# Patient Record
Sex: FEMALE | Race: OTHER | Employment: FULL TIME | URBAN - NONMETROPOLITAN AREA
[De-identification: names, ages, dates, MRNs, and addresses within clinical notes are randomized per-mention and may not be internally consistent; named-entity substitution may affect disease eponyms.]

---

## 2017-06-03 ENCOUNTER — HOSPITAL ENCOUNTER (EMERGENCY)
Age: 32
Discharge: HOME | End: 2017-06-03
Payer: COMMERCIAL

## 2017-06-03 VITALS
BODY MASS INDEX: 32.95 KG/M2 | TEMPERATURE: 98.2 F | OXYGEN SATURATION: 99 % | SYSTOLIC BLOOD PRESSURE: 110 MMHG | HEART RATE: 78 BPM | RESPIRATION RATE: 20 BRPM | WEIGHT: 205 LBS | DIASTOLIC BLOOD PRESSURE: 80 MMHG | HEIGHT: 66 IN

## 2017-06-03 LAB
ALBUMIN SERPL-MCNC: 4.7 G/DL (ref 3.5–5.2)
ALP BLD-CCNC: 57 U/L (ref 35–104)
ALT SERPL-CCNC: 10 U/L (ref 5–33)
AST SERPL-CCNC: 14 U/L (ref 5–32)
BILIRUB SERPL-MCNC: 0.9 MG/DL (ref 0.2–1.2)
BILIRUBIN DIRECT: 0.2 MG/DL (ref 0–0.3)
BILIRUBIN, INDIRECT: 0.7 MG/DL (ref 0.1–1)
HAV IGM SER IA-ACNC: NORMAL
HEPATITIS B CORE IGM ANTIBODY: NORMAL
HEPATITIS B SURFACE ANTIGEN INTERPRETATION: NORMAL
HEPATITIS C ANTIBODY INTERPRETATION: NORMAL
RAPID HIV 1&2: NORMAL
TOTAL PROTEIN: 7.2 G/DL (ref 6.6–8.7)

## 2017-06-03 PROCEDURE — 90715 TDAP VACCINE 7 YRS/> IM: CPT | Performed by: EMERGENCY MEDICINE

## 2017-06-03 PROCEDURE — 86701 HIV-1ANTIBODY: CPT

## 2017-06-03 PROCEDURE — 99282 EMERGENCY DEPT VISIT SF MDM: CPT

## 2017-06-03 PROCEDURE — 80074 ACUTE HEPATITIS PANEL: CPT

## 2017-06-03 PROCEDURE — 99282 EMERGENCY DEPT VISIT SF MDM: CPT | Performed by: NURSE PRACTITIONER

## 2017-06-03 PROCEDURE — 80076 HEPATIC FUNCTION PANEL: CPT

## 2017-06-03 PROCEDURE — 90471 IMMUNIZATION ADMIN: CPT | Performed by: EMERGENCY MEDICINE

## 2017-06-03 PROCEDURE — 6360000002 HC RX W HCPCS: Performed by: EMERGENCY MEDICINE

## 2017-06-03 PROCEDURE — 36415 COLL VENOUS BLD VENIPUNCTURE: CPT

## 2017-06-03 RX ORDER — MEDROXYPROGESTERONE ACETATE 150 MG/ML
150 INJECTION, SUSPENSION INTRAMUSCULAR
Status: ON HOLD | COMMUNITY
End: 2021-10-07 | Stop reason: ALTCHOICE

## 2017-06-03 RX ADMIN — TETANUS TOXOID, REDUCED DIPHTHERIA TOXOID AND ACELLULAR PERTUSSIS VACCINE, ADSORBED 0.5 ML: 5; 2.5; 8; 8; 2.5 SUSPENSION INTRAMUSCULAR at 01:21

## 2019-04-29 ENCOUNTER — TRANSCRIBE ORDERS (OUTPATIENT)
Dept: ADMINISTRATIVE | Facility: HOSPITAL | Age: 34
End: 2019-04-29

## 2019-04-29 ENCOUNTER — LAB (OUTPATIENT)
Dept: LAB | Facility: HOSPITAL | Age: 34
End: 2019-04-29

## 2019-04-29 DIAGNOSIS — R30.0 DYSURIA: Primary | ICD-10-CM

## 2019-04-29 DIAGNOSIS — R30.0 DYSURIA: ICD-10-CM

## 2019-04-29 LAB
BACTERIA UR QL AUTO: ABNORMAL /HPF
BILIRUB UR QL STRIP: NEGATIVE
CLARITY UR: CLEAR
COLOR UR: YELLOW
GLUCOSE UR STRIP-MCNC: NEGATIVE MG/DL
HGB UR QL STRIP.AUTO: ABNORMAL
HYALINE CASTS UR QL AUTO: ABNORMAL /LPF
KETONES UR QL STRIP: NEGATIVE
LEUKOCYTE ESTERASE UR QL STRIP.AUTO: ABNORMAL
NITRITE UR QL STRIP: NEGATIVE
PH UR STRIP.AUTO: 6 [PH] (ref 5–8)
PROT UR QL STRIP: NEGATIVE
RBC # UR: ABNORMAL /HPF
REF LAB TEST METHOD: ABNORMAL
SP GR UR STRIP: <=1.005 (ref 1–1.03)
SQUAMOUS #/AREA URNS HPF: ABNORMAL /HPF
UROBILINOGEN UR QL STRIP: ABNORMAL
WBC UR QL AUTO: ABNORMAL /HPF

## 2019-04-29 PROCEDURE — 87086 URINE CULTURE/COLONY COUNT: CPT | Performed by: PEDIATRICS

## 2019-04-29 PROCEDURE — 87186 SC STD MICRODIL/AGAR DIL: CPT | Performed by: PEDIATRICS

## 2019-04-29 PROCEDURE — 87088 URINE BACTERIA CULTURE: CPT | Performed by: PEDIATRICS

## 2019-04-29 PROCEDURE — 81001 URINALYSIS AUTO W/SCOPE: CPT | Performed by: PEDIATRICS

## 2019-05-01 LAB
BACTERIA SPEC AEROBE CULT: ABNORMAL
BACTERIA SPEC AEROBE CULT: ABNORMAL

## 2019-05-15 ENCOUNTER — OFFICE VISIT (OUTPATIENT)
Dept: GASTROENTEROLOGY | Facility: CLINIC | Age: 34
End: 2019-05-15

## 2019-05-15 ENCOUNTER — LAB (OUTPATIENT)
Dept: LAB | Facility: HOSPITAL | Age: 34
End: 2019-05-15

## 2019-05-15 VITALS
BODY MASS INDEX: 37.32 KG/M2 | DIASTOLIC BLOOD PRESSURE: 74 MMHG | HEIGHT: 65 IN | TEMPERATURE: 98.2 F | OXYGEN SATURATION: 100 % | HEART RATE: 104 BPM | WEIGHT: 224 LBS | SYSTOLIC BLOOD PRESSURE: 124 MMHG

## 2019-05-15 DIAGNOSIS — K59.09 OTHER CONSTIPATION: ICD-10-CM

## 2019-05-15 DIAGNOSIS — K62.5 RECTAL BLEEDING: ICD-10-CM

## 2019-05-15 DIAGNOSIS — K59.09 OTHER CONSTIPATION: Primary | ICD-10-CM

## 2019-05-15 DIAGNOSIS — K64.8 OTHER HEMORRHOIDS: ICD-10-CM

## 2019-05-15 LAB
ALBUMIN SERPL-MCNC: 4.5 G/DL (ref 3.5–5)
ALBUMIN/GLOB SERPL: 1.4 G/DL (ref 1.1–2.5)
ALP SERPL-CCNC: 57 U/L (ref 24–120)
ALT SERPL W P-5'-P-CCNC: 15 U/L (ref 0–54)
ANION GAP SERPL CALCULATED.3IONS-SCNC: 10 MMOL/L (ref 4–13)
AST SERPL-CCNC: 29 U/L (ref 7–45)
BASOPHILS # BLD AUTO: 0.02 10*3/MM3 (ref 0–0.2)
BASOPHILS NFR BLD AUTO: 0.5 % (ref 0–2)
BILIRUB SERPL-MCNC: 1.2 MG/DL (ref 0.1–1)
BUN BLD-MCNC: 9 MG/DL (ref 5–21)
BUN/CREAT SERPL: 15.3 (ref 7–25)
CALCIUM SPEC-SCNC: 9.4 MG/DL (ref 8.4–10.4)
CHLORIDE SERPL-SCNC: 108 MMOL/L (ref 98–110)
CO2 SERPL-SCNC: 25 MMOL/L (ref 24–31)
CREAT BLD-MCNC: 0.59 MG/DL (ref 0.5–1.4)
DEPRECATED RDW RBC AUTO: 38.3 FL (ref 40–54)
EOSINOPHIL # BLD AUTO: 0.04 10*3/MM3 (ref 0–0.7)
EOSINOPHIL NFR BLD AUTO: 1.1 % (ref 0–4)
ERYTHROCYTE [DISTWIDTH] IN BLOOD BY AUTOMATED COUNT: 12.7 % (ref 12–15)
GFR SERPL CREATININE-BSD FRML MDRD: 116 ML/MIN/1.73
GLOBULIN UR ELPH-MCNC: 3.2 GM/DL
GLUCOSE BLD-MCNC: 86 MG/DL (ref 70–100)
HCT VFR BLD AUTO: 36.6 % (ref 37–47)
HGB BLD-MCNC: 11.9 G/DL (ref 12–16)
IMM GRANULOCYTES # BLD AUTO: 0.01 10*3/MM3 (ref 0–0.05)
IMM GRANULOCYTES NFR BLD AUTO: 0.3 % (ref 0–5)
LYMPHOCYTES # BLD AUTO: 0.98 10*3/MM3 (ref 0.72–4.86)
LYMPHOCYTES NFR BLD AUTO: 26.1 % (ref 15–45)
MCH RBC QN AUTO: 27.5 PG (ref 28–32)
MCHC RBC AUTO-ENTMCNC: 32.5 G/DL (ref 33–36)
MCV RBC AUTO: 84.5 FL (ref 82–98)
MONOCYTES # BLD AUTO: 0.38 10*3/MM3 (ref 0.19–1.3)
MONOCYTES NFR BLD AUTO: 10.1 % (ref 4–12)
NEUTROPHILS # BLD AUTO: 2.32 10*3/MM3 (ref 1.87–8.4)
NEUTROPHILS NFR BLD AUTO: 61.9 % (ref 39–78)
NRBC BLD AUTO-RTO: 0 /100 WBC (ref 0–0.2)
PLATELET # BLD AUTO: 247 10*3/MM3 (ref 130–400)
PMV BLD AUTO: 10.4 FL (ref 6–12)
POTASSIUM BLD-SCNC: 4.1 MMOL/L (ref 3.5–5.3)
PROT SERPL-MCNC: 7.7 G/DL (ref 6.3–8.7)
RBC # BLD AUTO: 4.33 10*6/MM3 (ref 4.2–5.4)
SODIUM BLD-SCNC: 143 MMOL/L (ref 135–145)
T4 FREE SERPL-MCNC: 1.04 NG/DL (ref 0.78–2.19)
TSH SERPL DL<=0.05 MIU/L-ACNC: 1.18 MIU/ML (ref 0.47–4.68)
WBC NRBC COR # BLD: 3.75 10*3/MM3 (ref 4.8–10.8)

## 2019-05-15 PROCEDURE — 99204 OFFICE O/P NEW MOD 45 MIN: CPT | Performed by: NURSE PRACTITIONER

## 2019-05-15 PROCEDURE — 84443 ASSAY THYROID STIM HORMONE: CPT | Performed by: NURSE PRACTITIONER

## 2019-05-15 PROCEDURE — 85025 COMPLETE CBC W/AUTO DIFF WBC: CPT | Performed by: NURSE PRACTITIONER

## 2019-05-15 PROCEDURE — 36415 COLL VENOUS BLD VENIPUNCTURE: CPT

## 2019-05-15 PROCEDURE — 84439 ASSAY OF FREE THYROXINE: CPT | Performed by: NURSE PRACTITIONER

## 2019-05-15 PROCEDURE — 80053 COMPREHEN METABOLIC PANEL: CPT | Performed by: NURSE PRACTITIONER

## 2019-05-15 RX ORDER — SODIUM, POTASSIUM,MAG SULFATES 17.5-3.13G
1 SOLUTION, RECONSTITUTED, ORAL ORAL EVERY 12 HOURS
Qty: 2 BOTTLE | Refills: 0 | Status: ON HOLD | OUTPATIENT
Start: 2019-05-15 | End: 2019-05-22

## 2019-05-15 RX ORDER — MEDROXYPROGESTERONE ACETATE 150 MG/ML
1 INJECTION, SUSPENSION INTRAMUSCULAR
COMMUNITY
End: 2020-04-21

## 2019-05-15 NOTE — PROGRESS NOTES
"Chief Complaint:   Chief Complaint   Patient presents with   • Constipation     Pt has had increased constipation since January; will have very had stools in the mornings; pt had a colonoscopy in [place] in 2015   • Hemorrhoids     Pt states she has always had a \"small cluster\" of hemorrhoids; states she would see [facility] when she would have to strain for a BM; has been using OTC wipes/creams that seem to help   • Rectal Pain     Pt states she will have rectal pain for a few hours after a BM; states she has been using laxatives/laxative tea and hasn't experienced as much pain the last month          Patient ID: Ratna Flor is a 33 y.o. female     History of Present Illness: This is a very pleasant 33-year-old female who is here today with complaints of constipation, hemorrhoids and rectal pain.    The patient was referred by Dr. Tiwari.  She was seen in his office for for UTI on 4/30/2019.  the patient also had complaints of saying that she had irritable bowel syndrome with diarrhea.  The patient was treated with a Z-Wyatt and Rocephin injection for the UTI.    The patient had a colonoscopy in [facility] [place] and 2015 for complaints of bloody stools and found a \"cluster of hemorrhoids.\" She states there is no family history of colon cancer or colon polyps.     She states she that starting in January was having a stool everyday but very hard and painful bowel movement. She states that she is using a \"laxative tea and biscodyl.\"  She states that those have seemed to help.  She states that the cream she was given has helped the hemorrhoid some but not completely resolved.  She states she does see blood with wiping and sometimes in the stool water.    The patient denies any nausea, vomiting, epigastric pain, dysphagia, pyrosis or hematemesis.  The patient denies any fever or chills.  Denies any melena or hematochezia.  Denies any unintentional weight loss or loss of appetite.    History reviewed. No pertinent past medical " "history.    Past Surgical History:   Procedure Laterality Date   • WISDOM TOOTH EXTRACTION           Current Outpatient Medications:   •  Bisacodyl (LAXATIVE PO), Take  by mouth Daily., Disp: , Rfl:   •  medroxyPROGESTERone (DEPO-PROVERA) 150 MG/ML injection, Inject 1 mL under the skin into the appropriate area as directed Every 3 (Three) Months., Disp: , Rfl:   •  Probiotic Product (PROBIOTIC ADVANCED PO), Take 1 tablet by mouth Every Other Day., Disp: , Rfl:   •  hydrocortisone (ANUSOL-HC) 2.5 % rectal cream, Insert  into the rectum 4 (Four) Times a Day As Needed for Hemorrhoids (rectal discomfort)., Disp: 30 g, Rfl: 5  •  sodium-potassium-magnesium sulfates (SUPREP BOWEL PREP KIT) 17.5-3.13-1.6 GM/177ML solution oral solution, Take 1 bottle by mouth Every 12 (Twelve) Hours. Split dose prep as directed by office instructions provided.  2 bottles = one kit., Disp: 2 bottle, Rfl: 0    No Known Allergies    Social History     Socioeconomic History   • Marital status:      Spouse name: Not on file   • Number of children: Not on file   • Years of education: Not on file   • Highest education level: Not on file   Tobacco Use   • Smoking status: Former Smoker   • Smokeless tobacco: Never Used   Substance and Sexual Activity   • Alcohol use: Yes     Frequency: Never     Comment: Occasional       Family History   Problem Relation Age of Onset   • Colon cancer Neg Hx    • Colon polyps Neg Hx    • Esophageal cancer Neg Hx    • Liver cancer Neg Hx    • Stomach cancer Neg Hx    • Rectal cancer Neg Hx        Vitals:    05/15/19 0832   BP: 124/74   BP Location: Left arm   Patient Position: Sitting   Cuff Size: Adult   Pulse: 104   Temp: 98.2 °F (36.8 °C)   TempSrc: Tympanic   SpO2: 100%   Weight: 102 kg (224 lb)   Height: 165.1 cm (65\")       Review of Systems:    General:    Present -feeling well   Skin:    Not Present-Rash   HEENT:     Not Present-Acute visual changes or Acute hearing changes   Neck :    Not Present- " swollen glands   Genitourinary:      Not Present- burning, frequency, urgency hematuria, dysuria,   Cardiovascular:   Not Present-chest pain, palpitations, or pressure   Respiratory:   Not Present- shortness of breath or cough   Gastrointestinal:  Musculoskeletal:  Neurological:  Psychiatric:   Present as mentioned in the HP    Not Present. Recent gait disturbances.    Not Present-Seizures and weakness in extremities.    Not Present- Anxiety or Depression.       Physical Exam:    General Appearance:    Alert, cooperative, in no acute distress   Psych:    Mood appropriate    Eyes:          conjunctivae and sclerae normal, no   icterus, no pallor   ENMT:    Ears appear intact with no abnormalities noted oral mucosa moist   Neck:   No adenopathy, supple, trachea midline, no thyromegaly, no   carotid bruit, no JVD    Cardiovascular:    Regular rhythm and normal rate, normal S1 and S2, no            murmur, no gallop, no rub, no click   Gastrointestinal:     Inspection normal.  Normal bowel sounds, no masses, no organomegaly, soft round non-tender, non-distended, no guarding, no rebound or tenderness. No hepatosplenomegaly.   Skin:   No bleeding, bruising or rash   Neurologic:   nonfocal       Lab Results   Component Value Date    WBC 3.75 (L) 05/15/2019    HGB 11.9 (L) 05/15/2019    HCT 36.6 (L) 05/15/2019     05/15/2019        Lab Results   Component Value Date     05/15/2019    K 4.1 05/15/2019     05/15/2019    CO2 25.0 05/15/2019    BUN 9 05/15/2019    CREATININE 0.59 05/15/2019    BILITOT 1.2 (H) 05/15/2019    ALKPHOS 57 05/15/2019    ALT 15 05/15/2019    AST 29 05/15/2019    GLUCOSE 86 05/15/2019       No results found for: INR    Body mass index is 37.28 kg/m². Patient's Body mass index is 37.28 kg/m². BMI is above normal parameters. Recommendations include: nutrition counseling.    Assessment and Plan:  Assessment/Plan   Ratna was seen today for constipation, hemorrhoids and rectal  pain.    Diagnoses and all orders for this visit:    Other constipation  Comments:  Discussed using MiraLAX the patient states that she would try it.  Orders:  -     TSH; Future  -     T4, Free; Future  -     CBC & Differential; Future  -     Comprehensive Metabolic Panel; Future  -     Case Request; Standing  -     Case Request    Rectal bleeding  Comments:  Schedule colonoscopy.  Orders:  -     Case Request; Standing  -     Case Request    Other hemorrhoids  Comments:  Prescription for Anusol sent to pharmacy.  Orders:  -     Case Request; Standing  -     Case Request    Other orders  -     hydrocortisone (ANUSOL-HC) 2.5 % rectal cream; Insert  into the rectum 4 (Four) Times a Day As Needed for Hemorrhoids (rectal discomfort).  -     Follow Anesthesia Guidelines / Standing Orders; Future  -     Obtain Informed Consent; Future  -     Implement Anesthesia Orders Day of Procedure; Standing  -     Obtain Informed Consent; Standing  -     Verify bowel prep was successful; Standing  -     sodium-potassium-magnesium sulfates (SUPREP BOWEL PREP KIT) 17.5-3.13-1.6 GM/177ML solution oral solution; Take 1 bottle by mouth Every 12 (Twelve) Hours. Split dose prep as directed by office instructions provided.  2 bottles = one kit.             There are no Patient Instructions on file for this visit.    Next follow-up appointment      The risks, benefits, and alternatives of colonoscopy were reviewed with the patient today.  Risks including perforation of the colon possibly requiring surgery or colostomy.  Additional risks include risk of bleeding from biopsies or removal of colon tissue.  There is also the risk of a drug reaction or problems with anesthesia.  This will be discussed with the further by the anesthesia team on the day of the procedure.  Lastly there is a possibility of missing a colon polyp or cancer.  The benefits include the diagnosis and management of disease of the colon and rectum.  Alternatives to colonoscopy  include barium enema, laboratory testing, radiographic evaluation, or no intervention.  The patient verbalizes understanding and agrees.      EMR Dragon/Transcription disclaimer:  Much of this encounter note is an electronic transcription/translation of spoken language to printed text. The electronic translation of spoken language may permit erroneous, or at times, nonsensical words or phrases to be inadvertently transcribed; although I have reviewed the note for such errors, some may still exist.

## 2019-05-22 ENCOUNTER — ANESTHESIA EVENT (OUTPATIENT)
Dept: GASTROENTEROLOGY | Facility: HOSPITAL | Age: 34
End: 2019-05-22

## 2019-05-22 ENCOUNTER — ANESTHESIA (OUTPATIENT)
Dept: GASTROENTEROLOGY | Facility: HOSPITAL | Age: 34
End: 2019-05-22

## 2019-05-22 ENCOUNTER — HOSPITAL ENCOUNTER (OUTPATIENT)
Facility: HOSPITAL | Age: 34
Setting detail: HOSPITAL OUTPATIENT SURGERY
Discharge: HOME | End: 2019-05-22
Attending: INTERNAL MEDICINE | Admitting: INTERNAL MEDICINE

## 2019-05-22 VITALS
RESPIRATION RATE: 14 BRPM | TEMPERATURE: 98.2 F | DIASTOLIC BLOOD PRESSURE: 65 MMHG | HEART RATE: 73 BPM | HEIGHT: 65 IN | WEIGHT: 224 LBS | BODY MASS INDEX: 37.32 KG/M2 | SYSTOLIC BLOOD PRESSURE: 112 MMHG | OXYGEN SATURATION: 100 %

## 2019-05-22 LAB — B-HCG UR QL: NEGATIVE

## 2019-05-22 PROCEDURE — 45378 DIAGNOSTIC COLONOSCOPY: CPT | Performed by: INTERNAL MEDICINE

## 2019-05-22 PROCEDURE — 25010000002 PROPOFOL 10 MG/ML EMULSION: Performed by: NURSE ANESTHETIST, CERTIFIED REGISTERED

## 2019-05-22 PROCEDURE — 81025 URINE PREGNANCY TEST: CPT | Performed by: ANESTHESIOLOGY

## 2019-05-22 RX ORDER — SODIUM CHLORIDE 0.9 % (FLUSH) 0.9 %
3 SYRINGE (ML) INJECTION AS NEEDED
Status: DISCONTINUED | OUTPATIENT
Start: 2019-05-22 | End: 2019-05-22 | Stop reason: HOSPADM

## 2019-05-22 RX ORDER — SODIUM CHLORIDE 9 MG/ML
500 INJECTION, SOLUTION INTRAVENOUS CONTINUOUS PRN
Status: DISCONTINUED | OUTPATIENT
Start: 2019-05-22 | End: 2019-05-22 | Stop reason: HOSPADM

## 2019-05-22 RX ORDER — LIDOCAINE HYDROCHLORIDE 20 MG/ML
INJECTION, SOLUTION INFILTRATION; PERINEURAL AS NEEDED
Status: DISCONTINUED | OUTPATIENT
Start: 2019-05-22 | End: 2019-05-22 | Stop reason: SURG

## 2019-05-22 RX ORDER — PROPOFOL 10 MG/ML
VIAL (ML) INTRAVENOUS AS NEEDED
Status: DISCONTINUED | OUTPATIENT
Start: 2019-05-22 | End: 2019-05-22 | Stop reason: SURG

## 2019-05-22 RX ADMIN — LIDOCAINE HYDROCHLORIDE 50 MG: 20 INJECTION, SOLUTION INFILTRATION; PERINEURAL at 11:12

## 2019-05-22 RX ADMIN — PROPOFOL 520 MG: 10 INJECTION, EMULSION INTRAVENOUS at 11:12

## 2019-05-22 RX ADMIN — SODIUM CHLORIDE 500 ML: 9 INJECTION, SOLUTION INTRAVENOUS at 09:34

## 2019-05-22 NOTE — ANESTHESIA POSTPROCEDURE EVALUATION
Patient: Ratna Flor    Procedure Summary     Date:  05/22/19 Room / Location:  [facility] ENDOSCOPY 2 /  [facility]    Anesthesia Start:  1105 Anesthesia Stop:  1130    Procedure:  COLONOSCOPY WITH ANESTHESIA (N/A ) Diagnosis:       Other constipation      Rectal bleeding      Other hemorrhoids      (Other constipation [K59.09])      (Rectal bleeding [K62.5])      (Other hemorrhoids [K64.8])    Surgeon:  Octavia Mattson MD Provider:  Marita Pemberton CRNA    Anesthesia Type:  MAC ASA Status:  2          Anesthesia Type: MAC  Last vitals  BP   112/65 (05/22/19 1150)   Temp   98.2 °F (36.8 °C) (05/22/19 0909)   Pulse   73 (05/22/19 1150)   Resp   14 (05/22/19 1150)     SpO2   100 % (05/22/19 1150)     Post Anesthesia Care and Evaluation    Patient location during evaluation: PHASE II  Patient participation: complete - patient participated  Level of consciousness: awake and alert  Pain score: 0  Pain management: adequate  Airway patency: patent  Anesthetic complications: No anesthetic complications  PONV Status: none  Cardiovascular status: acceptable  Respiratory status: acceptable  Hydration status: acceptable  No anesthesia care post op

## 2019-05-22 NOTE — ANESTHESIA PREPROCEDURE EVALUATION
Anesthesia Evaluation     Patient summary reviewed and Nursing notes reviewed   no history of anesthetic complications:  NPO Solid Status: > 8 hours  NPO Liquid Status: > 4 hours           Airway   Mallampati: I  TM distance: >3 FB  Neck ROM: full  No difficulty expected  Dental - normal exam     Pulmonary - negative pulmonary ROS and normal exam   (-) sleep apnea, not a smoker  Cardiovascular - negative cardio ROS and normal exam  Exercise tolerance: good (4-7 METS)    (-) hypertension, CAD      Neuro/Psych- negative ROS  GI/Hepatic/Renal/Endo    (+) obesity,       Musculoskeletal (-) negative ROS    Abdominal  - normal exam    Bowel sounds: normal.   Substance History - negative use     OB/GYN negative ob/gyn ROS         Other                        Anesthesia Plan    ASA 2     MAC     intravenous induction   Anesthetic plan, all risks, benefits, and alternatives have been provided, discussed and informed consent has been obtained with: patient.

## 2019-07-19 ENCOUNTER — HOSPITAL ENCOUNTER (OUTPATIENT)
Dept: ULTRASOUND IMAGING | Facility: HOSPITAL | Age: 34
Discharge: HOME | End: 2019-07-19
Admitting: NURSE PRACTITIONER

## 2019-07-19 ENCOUNTER — TRANSCRIBE ORDERS (OUTPATIENT)
Dept: ADMINISTRATIVE | Facility: HOSPITAL | Age: 34
End: 2019-07-19

## 2019-07-19 DIAGNOSIS — R60.0 LOCALIZED EDEMA: ICD-10-CM

## 2019-07-19 DIAGNOSIS — R60.0 LOCALIZED EDEMA: Primary | ICD-10-CM

## 2019-07-19 PROCEDURE — 93971 EXTREMITY STUDY: CPT

## 2020-04-08 ENCOUNTER — TRANSCRIBE ORDERS (OUTPATIENT)
Dept: ADMINISTRATIVE | Facility: HOSPITAL | Age: 35
End: 2020-04-08

## 2020-04-08 ENCOUNTER — APPOINTMENT (OUTPATIENT)
Dept: LAB | Facility: HOSPITAL | Age: 35
End: 2020-04-08

## 2020-04-08 DIAGNOSIS — R10.817 GENERALIZED ABDOMINAL TENDERNESS, REBOUND TENDERNESS PRESENCE NOT SPECIFIED: Primary | ICD-10-CM

## 2020-04-08 LAB
ALBUMIN SERPL-MCNC: 4.5 G/DL (ref 3.5–5)
ALBUMIN/GLOB SERPL: 1.2 G/DL (ref 1.1–2.5)
ALP SERPL-CCNC: 59 U/L (ref 24–120)
ALT SERPL W P-5'-P-CCNC: 13 U/L (ref 0–35)
AMYLASE SERPL-CCNC: 121 U/L (ref 30–110)
ANION GAP SERPL CALCULATED.3IONS-SCNC: 13 MMOL/L (ref 4–13)
AST SERPL-CCNC: 19 U/L (ref 7–45)
AUTO MIXED CELLS #: 0.8 10*3/MM3 (ref 0.1–2.6)
AUTO MIXED CELLS %: 11.1 % (ref 0.1–24)
BACTERIA UR QL AUTO: ABNORMAL /HPF
BILIRUB SERPL-MCNC: 1.3 MG/DL (ref 0.1–1)
BILIRUB UR QL STRIP: NEGATIVE
BUN BLD-MCNC: 12 MG/DL (ref 5–21)
BUN/CREAT SERPL: 20.3
CALCIUM SPEC-SCNC: 9.6 MG/DL (ref 8.4–10.4)
CHLORIDE SERPL-SCNC: 105 MMOL/L (ref 98–110)
CLARITY UR: CLEAR
CO2 SERPL-SCNC: 26 MMOL/L (ref 24–31)
COLOR UR: YELLOW
CREAT BLD-MCNC: 0.59 MG/DL (ref 0.5–1.4)
ERYTHROCYTE [DISTWIDTH] IN BLOOD BY AUTOMATED COUNT: 12.2 % (ref 12.3–15.4)
ERYTHROCYTE [SEDIMENTATION RATE] IN BLOOD: 12 MM/HR (ref 0–20)
GFR SERPL CREATININE-BSD FRML MDRD: 115 ML/MIN/1.73
GLOBULIN UR ELPH-MCNC: 3.7 GM/DL
GLUCOSE BLD-MCNC: 92 MG/DL (ref 70–100)
GLUCOSE UR STRIP-MCNC: NEGATIVE MG/DL
HCT VFR BLD AUTO: 41.4 % (ref 34–46.6)
HGB BLD-MCNC: 14.1 G/DL (ref 12–15.9)
HGB UR QL STRIP.AUTO: ABNORMAL
HYALINE CASTS UR QL AUTO: ABNORMAL /LPF
KETONES UR QL STRIP: NEGATIVE
LEUKOCYTE ESTERASE UR QL STRIP.AUTO: NEGATIVE
LIPASE SERPL-CCNC: 184 U/L (ref 23–203)
LYMPHOCYTES # BLD AUTO: 1.5 10*3/MM3 (ref 0.7–3.1)
LYMPHOCYTES NFR BLD AUTO: 21.9 % (ref 19.6–45.3)
MCH RBC QN AUTO: 29.1 PG (ref 26.6–33)
MCHC RBC AUTO-ENTMCNC: 34.1 G/DL (ref 31.5–35.7)
MCV RBC AUTO: 85.4 FL (ref 79–97)
MUCOUS THREADS URNS QL MICRO: ABNORMAL /HPF
NEUTROPHILS # BLD AUTO: 4.7 10*3/MM3 (ref 1.7–7)
NEUTROPHILS NFR BLD AUTO: 67 % (ref 42.7–76)
NITRITE UR QL STRIP: NEGATIVE
PH UR STRIP.AUTO: 5.5 [PH] (ref 5–8)
PLATELET # BLD AUTO: 272 10*3/MM3 (ref 140–450)
PMV BLD AUTO: 9.3 FL (ref 6–12)
POTASSIUM BLD-SCNC: 4 MMOL/L (ref 3.5–5.3)
PROT SERPL-MCNC: 8.2 G/DL (ref 6.3–8.7)
PROT UR QL STRIP: NEGATIVE
RBC # BLD AUTO: 4.85 10*6/MM3 (ref 3.77–5.28)
RBC # UR: ABNORMAL /HPF
REF LAB TEST METHOD: ABNORMAL
SODIUM BLD-SCNC: 144 MMOL/L (ref 135–145)
SP GR UR STRIP: 1.02 (ref 1–1.03)
SQUAMOUS #/AREA URNS HPF: ABNORMAL /HPF
UROBILINOGEN UR QL STRIP: ABNORMAL
WBC NRBC COR # BLD: 7 10*3/MM3 (ref 3.4–10.8)
WBC UR QL AUTO: ABNORMAL /HPF

## 2020-04-08 PROCEDURE — 36415 COLL VENOUS BLD VENIPUNCTURE: CPT | Performed by: NURSE PRACTITIONER

## 2020-04-08 PROCEDURE — 85652 RBC SED RATE AUTOMATED: CPT | Performed by: NURSE PRACTITIONER

## 2020-04-08 PROCEDURE — 83690 ASSAY OF LIPASE: CPT | Performed by: NURSE PRACTITIONER

## 2020-04-08 PROCEDURE — 80053 COMPREHEN METABOLIC PANEL: CPT | Performed by: NURSE PRACTITIONER

## 2020-04-08 PROCEDURE — 81001 URINALYSIS AUTO W/SCOPE: CPT | Performed by: NURSE PRACTITIONER

## 2020-04-08 PROCEDURE — 87086 URINE CULTURE/COLONY COUNT: CPT | Performed by: NURSE PRACTITIONER

## 2020-04-08 PROCEDURE — 82150 ASSAY OF AMYLASE: CPT | Performed by: NURSE PRACTITIONER

## 2020-04-08 PROCEDURE — 85025 COMPLETE CBC W/AUTO DIFF WBC: CPT | Performed by: NURSE PRACTITIONER

## 2020-04-09 ENCOUNTER — HOSPITAL ENCOUNTER (OUTPATIENT)
Dept: ULTRASOUND IMAGING | Facility: HOSPITAL | Age: 35
Discharge: HOME | End: 2020-04-09
Admitting: NURSE PRACTITIONER

## 2020-04-09 PROCEDURE — 76700 US EXAM ABDOM COMPLETE: CPT

## 2020-04-10 LAB — BACTERIA SPEC AEROBE CULT: NORMAL

## 2020-04-20 ENCOUNTER — LAB (OUTPATIENT)
Dept: LAB | Facility: HOSPITAL | Age: 35
End: 2020-04-20

## 2020-04-20 ENCOUNTER — TRANSCRIBE ORDERS (OUTPATIENT)
Dept: LAB | Facility: HOSPITAL | Age: 35
End: 2020-04-20

## 2020-04-20 DIAGNOSIS — R31.9 URINARY TRACT INFECTION WITH HEMATURIA, SITE UNSPECIFIED: ICD-10-CM

## 2020-04-20 DIAGNOSIS — R31.9 URINARY TRACT INFECTION WITH HEMATURIA, SITE UNSPECIFIED: Primary | ICD-10-CM

## 2020-04-20 DIAGNOSIS — N39.0 URINARY TRACT INFECTION WITH HEMATURIA, SITE UNSPECIFIED: ICD-10-CM

## 2020-04-20 DIAGNOSIS — N39.0 URINARY TRACT INFECTION WITH HEMATURIA, SITE UNSPECIFIED: Primary | ICD-10-CM

## 2020-04-20 LAB
BILIRUB UR QL STRIP: NEGATIVE
CLARITY UR: CLEAR
COLOR UR: YELLOW
GLUCOSE UR STRIP-MCNC: NEGATIVE MG/DL
HGB UR QL STRIP.AUTO: NEGATIVE
KETONES UR QL STRIP: NEGATIVE
LEUKOCYTE ESTERASE UR QL STRIP.AUTO: NEGATIVE
NITRITE UR QL STRIP: NEGATIVE
PH UR STRIP.AUTO: 7 [PH] (ref 5–8)
PROT UR QL STRIP: NEGATIVE
SP GR UR STRIP: 1.01 (ref 1–1.03)
UROBILINOGEN UR QL STRIP: NORMAL

## 2020-04-20 PROCEDURE — 81003 URINALYSIS AUTO W/O SCOPE: CPT

## 2020-04-20 NOTE — PROGRESS NOTES
"Chief Complaint:   Chief Complaint   Patient presents with   • Abdominal Pain     Pt states every morning since the beginning of the year she wakes up and her stomach \"is turning/churning\"-will have to use bathroom and sometimes she had diarrhea but sometimes it's a normal BM; Pt states then she will get a lower abdominal presure like she needs to use the bathroom; Pt states this will happen 5-7 times every morning and just seems to be getting worse; Pt was seen at [facility]-states she had a US and was started on Dicyclomine-hasn't helped at all    • Diarrhea     Pt also c/o occasional diarrhea in the mornings   • Nausea     Pt also states she is nauseated occasionally          Patient ID: Ratna Flor is a 34 y.o. female     History of Present Illness: This is a very pleasant 34-year-old female who I have had over the phone encounter with for complaints of lower abdominal pain, diarrhea and constipation with diarrhea predominating along with some nausea.    Starting in January she states that she begins to have lower abdominal pain that feels \"like I have a bowel movement sometimes constipation and sometimes not.\" She states that she will continue to have the urge to have a bowel movement.  She states that loose stools to watery stools will predominate.  She states she can noticed this to be worse with eating however this begins to occur \"most every single morning.\"  She states when she has to \"push to have BM it makes me nauseated.\" She states that this can occur 6-7 times a day. She states that it takes until about mid afternoon until it stops. She states that she was given dicyclomine for about 2 weeks but this has not helped. She states she was also treated with antibiotics about 2 weeks ago for UTI.  Labs performed by Dr. Tiwari her PCP CBC and CMP on 4/8/2020 were found to be unremarkable.  Abdomen ultrasound complete found to be normal.  Amylase slightly elevated but lipase within normal limits.    The " patient's last colonoscopy was performed on 5/22/2019 with nonbleeding internal hemorrhoids otherwise normal.  Repeat colonoscopy was scheduled for 48 years old screening purposes.  There is no known family history of colon cancer or colon polyps.The patient denies any  vomiting, epigastric pain, dysphagia, pyrosis or hematemesis.  The patient denies any fever or chills.  Denies any melena or hematochezia.  Denies any unintentional weight loss or loss of appetite.    History reviewed. No pertinent past medical history.    Past Surgical History:   Procedure Laterality Date   • COLONOSCOPY N/A 5/22/2019    Non-bleeding internal hemorrhoids; The examination was otherwise normal; No specimens collected; Repeat colonoscopy at age 48 for screening purposes   • WISDOM TOOTH EXTRACTION           Current Outpatient Medications:   •  dicyclomine (BENTYL) 20 MG tablet, Take 20 mg by mouth 4 (Four) Times a Day., Disp: , Rfl:   •  ibuprofen (ADVIL,MOTRIN) 200 MG tablet, Take 200 mg by mouth As Needed for Mild Pain ., Disp: , Rfl:   •  Levonorgestrel (Liletta, 52 MG,) 19.5 MCG/DAY intrauterine device, by Intrauterine route 1 (One) Time., Disp: , Rfl:     No Known Allergies    Social History     Socioeconomic History   • Marital status:      Spouse name: Not on file   • Number of children: Not on file   • Years of education: Not on file   • Highest education level: Not on file   Tobacco Use   • Smoking status: Former Smoker     Types: Cigarettes   • Smokeless tobacco: Never Used   Substance and Sexual Activity   • Alcohol use: Yes     Frequency: Never     Comment: Occasional   • Drug use: No   • Sexual activity: Defer       Family History   Problem Relation Age of Onset   • Colon cancer Neg Hx    • Colon polyps Neg Hx    • Esophageal cancer Neg Hx    • Liver cancer Neg Hx    • Stomach cancer Neg Hx    • Rectal cancer Neg Hx    • Liver disease Neg Hx        There were no vitals filed for this visit.    Review of  Systems:    General:    Present -feeling well   Skin:    Not Present-Rash   HEENT:     Not Present-Acute visual changes or Acute hearing changes   Neck :    Not Present- swollen glands   Genitourinary:      Not Present- burning, frequency, urgency hematuria, dysuria,   Cardiovascular:   Not Present-chest pain, palpitations, or pressure   Respiratory:   Not Present- shortness of breath or cough   Gastrointestinal:  Musculoskeletal:  Neurological:  Psychiatric:   Present as mentioned in the HP    Not Present. Recent gait disturbances.    Not Present-Seizures and weakness in extremities.    Not Present- Anxiety or Depression.           Lab Results - Last 18 Months   Lab Units 04/08/20  1448 05/15/19  0951   GLUCOSE mg/dL 92 86   BUN mg/dL 12 9   CREATININE mg/dL 0.59 0.59   SODIUM mmol/L 144 143   POTASSIUM mmol/L 4.0 4.1   CHLORIDE mmol/L 105 108   CO2 mmol/L 26.0 25.0   TOTAL PROTEIN g/dL 8.2 7.7   ALBUMIN g/dL 4.50 4.50   ALT (SGPT) U/L 13 15   AST (SGOT) U/L 19 29   ALK PHOS U/L 59 57   BILIRUBIN mg/dL 1.3* 1.2*   GLOBULIN gm/dL 3.7 3.2   SED RATE mm/hr 12  --        Lab Results - Last 18 Months   Lab Units 04/08/20  1448 05/15/19  0951   HEMOGLOBIN g/dL 14.1 11.9*   HEMATOCRIT % 41.4 36.6*   MCV fL 85.4 84.5   WBC 10*3/mm3 7.00 3.75*   RDW % 12.2* 12.7   MPV fL 9.3 10.4   PLATELETS 10*3/mm3 272 247       Lab Results - Last 18 Months   Lab Units 05/15/19  0951   TSH mIU/mL 1.180          Assessment and Plan:  Assessment/Plan   Ratna was seen today for abdominal pain, diarrhea and nausea.    Diagnoses and all orders for this visit:    Diarrhea, unspecified type  -     TSH; Future  -     T4, Free; Future  -     Gastrointestinal Panel, PCR - Stool, Per Rectum; Future  -     Clostridium Difficile Toxin - , Per Rectum; Future    Lower abdominal pain  -     TSH; Future  -     T4, Free; Future  -     Gastrointestinal Panel, PCR - Stool, Per Rectum; Future  -     Clostridium Difficile Toxin - , Per Rectum;  "Future    Abdominal cramping  -     TSH; Future  -     T4, Free; Future  -     Gastrointestinal Panel, PCR - Stool, Per Rectum; Future  -     Clostridium Difficile Toxin - , Per Rectum; Future    Other constipation  -     TSH; Future  -     T4, Free; Future    I have instructed the patient to come in for labs to be drawn and will check gastrointestinal panel along with C. difficile at this time.  I do not think that we need to advance to a CT of abdomen pelvis however it could be plausible in the future.  At this time I will have her stop the dicyclomine as she states this is not helping at all.  I am going to send education written for IBS.  As noted above the patient had a normal colonoscopy last year.  Will await stool cultures depending on those findings will most likely initiate Imodium.  I instructed the patient to call me if her symptoms change or worsen if they become extreme then go to the ER.  She gave verbal understanding.      This visit has been rescheduled as a phone visit to comply with patient safety concerns in accordance with CDC recommendations. Total time of discussion was 25 minutes.       Patient Instructions   Diet for Irritable Bowel Syndrome  When you have irritable bowel syndrome (IBS), it is very important to eat the foods and follow the eating habits that are best for your condition. IBS may cause various symptoms such as pain in the abdomen, constipation, or diarrhea. Choosing the right foods can help to ease the discomfort from these symptoms. Work with your health care provider and diet and nutrition specialist (dietitian) to find the eating plan that will help to control your symptoms.  What are tips for following this plan?         · Keep a food diary. This will help you identify foods that cause symptoms. Write down:  ? What you eat and when you eat it.  ? What symptoms you have.  ? When symptoms occur in relation to your meals, such as \"pain in abdomen 2 hours after dinner.\"  · Eat " your meals slowly and in a relaxed setting.  · Aim to eat 5-6 small meals per day. Do not skip meals.  · Drink enough fluid to keep your urine pale yellow.  · Ask your health care provider if you should take an over-the-counter probiotic to help restore healthy bacteria in your gut (digestive tract).  ? Probiotics are foods that contain good bacteria and yeasts.  · Your dietitian may have specific dietary recommendations for you based on your symptoms. He or she may recommend that you:  ? Avoid foods that cause symptoms. Talk with your dietitian about other ways to get the same nutrients that are in those problem foods.  ? Avoid foods with gluten. Gluten is a protein that is found in rye, wheat, and barley.  ? Eat more foods that contain soluble fiber. Examples of foods with high soluble fiber include oats, seeds, and certain fruits and vegetables. Take a fiber supplement if directed by your dietitian.  ? Reduce or avoid certain foods called FODMAPs. These are foods that contain carbohydrates that are hard to digest. Ask your doctor which foods contain these carbohydrates.  What foods are not recommended?  The following are some foods and drinks that may make your symptoms worse:  · Fatty foods, such as french fries.  · Foods that contain gluten, such as pasta and cereal.  · Dairy products, such as milk, cheese, and ice cream.  · Chocolate.  · Alcohol.  · Products with caffeine, such as coffee.  · Carbonated drinks, such as soda.  · Foods that are high in FODMAPs. These include certain fruits and vegetables.  · Products with sweeteners such as honey, high fructose corn syrup, sorbitol, and mannitol.  The items listed above may not be a complete list of foods and beverages you should avoid. Contact a dietitian for more information.  What foods are good sources of fiber?  Your health care provider or dietitian may recommend that you eat more foods that contain fiber. Fiber can help to reduce constipation and other IBS  symptoms. Add foods with fiber to your diet a little at a time so your body can get used to them. Too much fiber at one time might cause gas and swelling of your abdomen. The following are some foods that are good sources of fiber:  · Berries, such as raspberries, strawberries, and blueberries.  · Tomatoes.  · Carrots.  · Brown rice.  · Oats.  · Seeds, such as debbie and pumpkin seeds.  The items listed above may not be a complete list of recommended sources of fiber. Contact your dietitian for more options.  Where to find more information  · International Foundation for Functional Gastrointestinal Disorders: www.iffgd.org  · National [place] of Diabetes and Digestive and Kidney Diseases: www.niddk.nih.gov  Summary  · When you have irritable bowel syndrome (IBS), it is very important to eat the foods and follow the eating habits that are best for your condition.  · IBS may cause various symptoms such as pain in the abdomen, constipation, or diarrhea.  · Choosing the right foods can help to ease the discomfort that comes from symptoms.  · Keep a food diary. This will help you identify foods that cause symptoms.  · Your health care provider or diet and nutrition specialist (dietitian) may recommend that you eat more foods that contain fiber.  This information is not intended to replace advice given to you by your health care provider. Make sure you discuss any questions you have with your health care provider.  Document Released: 03/09/2005 Document Revised: 07/15/2019 Document Reviewed: 08/21/2018  [employer] Patient Education © 2020 [employer]        Next follow-up appointment      EMR Dragon/Transcription disclaimer:  Much of this encounter note is an electronic transcription/translation of spoken language to printed text. The electronic translation of spoken language may permit erroneous, or at times, nonsensical words or phrases to be inadvertently transcribed; although I have reviewed the note for  such errors, some may still exist.

## 2020-04-21 ENCOUNTER — OFFICE VISIT (OUTPATIENT)
Dept: GASTROENTEROLOGY | Facility: CLINIC | Age: 35
End: 2020-04-21

## 2020-04-21 DIAGNOSIS — R10.9 ABDOMINAL CRAMPING: ICD-10-CM

## 2020-04-21 DIAGNOSIS — K59.09 OTHER CONSTIPATION: ICD-10-CM

## 2020-04-21 DIAGNOSIS — R10.30 LOWER ABDOMINAL PAIN: ICD-10-CM

## 2020-04-21 DIAGNOSIS — R19.7 DIARRHEA, UNSPECIFIED TYPE: Primary | ICD-10-CM

## 2020-04-21 PROCEDURE — 99443 PR PHYS/QHP TELEPHONE EVALUATION 21-30 MIN: CPT | Performed by: NURSE PRACTITIONER

## 2020-04-21 RX ORDER — IBUPROFEN 200 MG
200 TABLET ORAL AS NEEDED
COMMUNITY
End: 2021-03-15

## 2020-04-21 RX ORDER — DICYCLOMINE HCL 20 MG
20 TABLET ORAL 4 TIMES DAILY
COMMUNITY
Start: 2020-04-08 | End: 2020-05-01

## 2020-04-21 NOTE — PATIENT INSTRUCTIONS
"Diet for Irritable Bowel Syndrome  When you have irritable bowel syndrome (IBS), it is very important to eat the foods and follow the eating habits that are best for your condition. IBS may cause various symptoms such as pain in the abdomen, constipation, or diarrhea. Choosing the right foods can help to ease the discomfort from these symptoms. Work with your health care provider and diet and nutrition specialist (dietitian) to find the eating plan that will help to control your symptoms.  What are tips for following this plan?         · Keep a food diary. This will help you identify foods that cause symptoms. Write down:  ? What you eat and when you eat it.  ? What symptoms you have.  ? When symptoms occur in relation to your meals, such as \"pain in abdomen 2 hours after dinner.\"  · Eat your meals slowly and in a relaxed setting.  · Aim to eat 5-6 small meals per day. Do not skip meals.  · Drink enough fluid to keep your urine pale yellow.  · Ask your health care provider if you should take an over-the-counter probiotic to help restore healthy bacteria in your gut (digestive tract).  ? Probiotics are foods that contain good bacteria and yeasts.  · Your dietitian may have specific dietary recommendations for you based on your symptoms. He or she may recommend that you:  ? Avoid foods that cause symptoms. Talk with your dietitian about other ways to get the same nutrients that are in those problem foods.  ? Avoid foods with gluten. Gluten is a protein that is found in rye, wheat, and barley.  ? Eat more foods that contain soluble fiber. Examples of foods with high soluble fiber include oats, seeds, and certain fruits and vegetables. Take a fiber supplement if directed by your dietitian.  ? Reduce or avoid certain foods called FODMAPs. These are foods that contain carbohydrates that are hard to digest. Ask your doctor which foods contain these carbohydrates.  What foods are not recommended?  The following are some " foods and drinks that may make your symptoms worse:  · Fatty foods, such as french fries.  · Foods that contain gluten, such as pasta and cereal.  · Dairy products, such as milk, cheese, and ice cream.  · Chocolate.  · Alcohol.  · Products with caffeine, such as coffee.  · Carbonated drinks, such as soda.  · Foods that are high in FODMAPs. These include certain fruits and vegetables.  · Products with sweeteners such as honey, high fructose corn syrup, sorbitol, and mannitol.  The items listed above may not be a complete list of foods and beverages you should avoid. Contact a dietitian for more information.  What foods are good sources of fiber?  Your health care provider or dietitian may recommend that you eat more foods that contain fiber. Fiber can help to reduce constipation and other IBS symptoms. Add foods with fiber to your diet a little at a time so your body can get used to them. Too much fiber at one time might cause gas and swelling of your abdomen. The following are some foods that are good sources of fiber:  · Berries, such as raspberries, strawberries, and blueberries.  · Tomatoes.  · Carrots.  · Brown rice.  · Oats.  · Seeds, such as debbie and pumpkin seeds.  The items listed above may not be a complete list of recommended sources of fiber. Contact your dietitian for more options.  Where to find more information  · International Foundation for Functional Gastrointestinal Disorders: www.iffgd.org  · National [place] of Diabetes and Digestive and Kidney Diseases: www.niddk.nih.gov  Summary  · When you have irritable bowel syndrome (IBS), it is very important to eat the foods and follow the eating habits that are best for your condition.  · IBS may cause various symptoms such as pain in the abdomen, constipation, or diarrhea.  · Choosing the right foods can help to ease the discomfort that comes from symptoms.  · Keep a food diary. This will help you identify foods that cause symptoms.  · Your health  care provider or diet and nutrition specialist (dietitian) may recommend that you eat more foods that contain fiber.  This information is not intended to replace advice given to you by your health care provider. Make sure you discuss any questions you have with your health care provider.  Document Released: 03/09/2005 Document Revised: 07/15/2019 Document Reviewed: 08/21/2018  [employer] Interactive Patient Education © 2020 [employer]

## 2020-04-22 ENCOUNTER — APPOINTMENT (OUTPATIENT)
Dept: LAB | Facility: HOSPITAL | Age: 35
End: 2020-04-22

## 2020-04-23 ENCOUNTER — LAB (OUTPATIENT)
Dept: LAB | Facility: HOSPITAL | Age: 35
End: 2020-04-23

## 2020-04-23 DIAGNOSIS — R10.9 ABDOMINAL CRAMPING: ICD-10-CM

## 2020-04-23 DIAGNOSIS — K59.09 OTHER CONSTIPATION: ICD-10-CM

## 2020-04-23 DIAGNOSIS — R10.30 LOWER ABDOMINAL PAIN: ICD-10-CM

## 2020-04-23 DIAGNOSIS — R19.7 DIARRHEA, UNSPECIFIED TYPE: ICD-10-CM

## 2020-04-23 LAB
ADV 40+41 DNA STL QL NAA+NON-PROBE: NOT DETECTED
ASTRO TYP 1-8 RNA STL QL NAA+NON-PROBE: NOT DETECTED
C CAYETANENSIS DNA STL QL NAA+NON-PROBE: NOT DETECTED
C DIFF TOX GENS STL QL NAA+PROBE: NEGATIVE
CAMPY SP DNA.DIARRHEA STL QL NAA+PROBE: NOT DETECTED
CRYPTOSP STL CULT: NOT DETECTED
E COLI DNA SPEC QL NAA+PROBE: NOT DETECTED
E HISTOLYT AG STL-ACNC: NOT DETECTED
EAEC PAA PLAS AGGR+AATA ST NAA+NON-PRB: NOT DETECTED
EC STX1 + STX2 GENES STL NAA+PROBE: NOT DETECTED
EPEC EAE GENE STL QL NAA+NON-PROBE: DETECTED
ETEC LTA+ST1A+ST1B TOX ST NAA+NON-PROBE: NOT DETECTED
G LAMBLIA DNA SPEC QL NAA+PROBE: NOT DETECTED
NOROVIRUS GI+II RNA STL QL NAA+NON-PROBE: NOT DETECTED
P SHIGELLOIDES DNA STL QL NAA+PROBE: NOT DETECTED
RV RNA STL NAA+PROBE: NOT DETECTED
SALMONELLA DNA SPEC QL NAA+PROBE: NOT DETECTED
SAPO I+II+IV+V RNA STL QL NAA+NON-PROBE: NOT DETECTED
SHIGELLA SP+EIEC IPAH STL QL NAA+PROBE: NOT DETECTED
T4 FREE SERPL-MCNC: 1.24 NG/DL (ref 0.93–1.7)
TSH SERPL DL<=0.05 MIU/L-ACNC: 1.63 UIU/ML (ref 0.27–4.2)
V CHOLERAE DNA SPEC QL NAA+PROBE: NOT DETECTED
VIBRIO DNA SPEC NAA+PROBE: NOT DETECTED
YERSINIA STL CULT: NOT DETECTED

## 2020-04-23 PROCEDURE — 87493 C DIFF AMPLIFIED PROBE: CPT | Performed by: NURSE PRACTITIONER

## 2020-04-23 PROCEDURE — 0097U HC BIOFIRE FILMARRAY GI PANEL: CPT | Performed by: NURSE PRACTITIONER

## 2020-04-23 PROCEDURE — 84443 ASSAY THYROID STIM HORMONE: CPT | Performed by: NURSE PRACTITIONER

## 2020-04-23 PROCEDURE — 36415 COLL VENOUS BLD VENIPUNCTURE: CPT

## 2020-04-23 PROCEDURE — 84439 ASSAY OF FREE THYROXINE: CPT | Performed by: NURSE PRACTITIONER

## 2020-04-24 ENCOUNTER — TELEPHONE (OUTPATIENT)
Dept: GASTROENTEROLOGY | Facility: CLINIC | Age: 35
End: 2020-04-24

## 2020-04-24 RX ORDER — AZITHROMYCIN 250 MG/1
TABLET, FILM COATED ORAL
Qty: 6 TABLET | Refills: 0 | Status: SHIPPED | OUTPATIENT
Start: 2020-04-24 | End: 2020-05-01

## 2020-04-24 NOTE — TELEPHONE ENCOUNTER
Rec'd fax from [facility] that they were unable to fill z-pack due to Covid-19 restrictions and pt's insurance. She filled a z-pack 3/25/2020. Per Gladis-call in Flagyl 500mg, 1 po QID X 7 days, #28, NR.     I called that in to [facility]-left VM on prescriber line. I called and discussed with pt-she tells me she picked that up for a sinus infection back in March and was told at that time by the provider that gave it to her that it was optional as she was given a short script for steroids as well. Pt never actually took any of the zpack.     I spoke to Gladis again-she would prefer pt to take the Z-pack as it is first line treatment for the E.Coli infection. I verified dosage, quantity,and expiration date with pt-it is the exact script Gladis called in. Pt was advised to take that and I called [facility] back to cancel Flagyl script.  Pt advised to call me back with any further questions/problems.

## 2020-04-24 NOTE — TELEPHONE ENCOUNTER
Pt called me back and I spoke to her re: Gladis's recommendations-she VU.  Pt advised to call me back with any further questions/problems.

## 2020-04-24 NOTE — TELEPHONE ENCOUNTER
"Pt called me back and I spoke to her re: results-she VU. Pt would like to try antibiotic at this point as she is still having problems. States the pain/bloating she was having is still there and starting last night she is having a pain in her LLQ. No fever but she has seen some [facility] but states she has hemorrhoids and \"that is where it's coming from.\"    She was asking if you thought an IBS med would be helpful since she is continuing to have problems? I told her I would have to ask you and call her back. She uses [facility]-can you send abx there? Thanks!   "

## 2020-04-24 NOTE — TELEPHONE ENCOUNTER
Tried to call pt re: results-was unable to reach her so I left VM asking her to call me back to discuss.

## 2020-04-24 NOTE — TELEPHONE ENCOUNTER
No I am not sending in an antibiotic if she is doing better. I was waiting to here what she said about her symptoms.

## 2020-04-24 NOTE — TELEPHONE ENCOUNTER
----- Message from MARIA INES Mendieta sent at 4/24/2020  8:32 AM CDT -----  Please notify the patient that labs were found to be normal negative C. difficile however she was positive for E. coli.  Normally this is a self-limited type of bacteria treated most especially with replacing fluids and electrolyte.  She did not have any complaints of fever or rectal bleeding.  This has gone farther than normal and I could treat her with an antibiotic but normally we do not we just let it run its course.  Please find out how she is doing if she wants an antibiotic I can call 1 in for this.

## 2020-05-01 ENCOUNTER — OFFICE VISIT (OUTPATIENT)
Dept: GASTROENTEROLOGY | Facility: CLINIC | Age: 35
End: 2020-05-01

## 2020-05-01 VITALS
OXYGEN SATURATION: 99 % | HEART RATE: 101 BPM | BODY MASS INDEX: 36.16 KG/M2 | DIASTOLIC BLOOD PRESSURE: 78 MMHG | TEMPERATURE: 98.5 F | HEIGHT: 66 IN | WEIGHT: 225 LBS | SYSTOLIC BLOOD PRESSURE: 122 MMHG

## 2020-05-01 DIAGNOSIS — R10.9 ABDOMINAL CRAMPING: ICD-10-CM

## 2020-05-01 DIAGNOSIS — K62.5 RECTAL BLEEDING: ICD-10-CM

## 2020-05-01 DIAGNOSIS — R10.30 LOWER ABDOMINAL PAIN: Primary | ICD-10-CM

## 2020-05-01 DIAGNOSIS — R19.7 DIARRHEA, UNSPECIFIED TYPE: ICD-10-CM

## 2020-05-01 PROCEDURE — 99214 OFFICE O/P EST MOD 30 MIN: CPT | Performed by: NURSE PRACTITIONER

## 2020-05-01 NOTE — PATIENT INSTRUCTIONS
Probiotics  Probiotics are the good bacteria and yeasts that live in your body and keep your digestive system healthy. Probiotics also help your body's defense system (immune system) and protect your body against the growth of harmful bacteria. Your health care provider may recommend taking a probiotic if you are taking antibiotics or have certain medical conditions, such as:  · Diarrhea.  · Constipation.  · Irritable bowel syndrome.  · Lung infections.  · Yeast infections.  · Acne, eczema, and other skin conditions.  · Frequent urinary tract infections.  What affects the balance of bacteria in my body?  The balance of good bacteria in your body can be affected by:  · Antibiotic medicines. These medicines treat infections caused by bacteria. Unfortunately, they may kill the good bacteria in your body as well as the bad bacteria.  · Certain medical conditions. Conditions related to an imbalance of bacteria include:  ? Stomach and intestine (gastrointestinal) infections.  ? Lung infections.  ? Skin infections.  ? Vaginal infections.  ? Inflammatory bowel diseases.  ? Stomach ulcers (gastric ulcers).  ? Tooth decay and gum disease (periodontal disease).  · Stress.  · Poor diet.  What type of probiotic is right for me?  Probiotics contain different types of bacteria (strains). Strains commonly found in probiotics include:  · Lactobacillus.  · Saccharomyces.  · Bifidobacterium.  Specific strains have been shown to be more effective for certain health conditions. Ask your health care provider which strain or strains you should use and how often.  Probiotics come in many different forms, strain combinations, and strengths. Some may need to be refrigerated. Always read the label for storage and usage instructions.  Certain foods, such as yogurt, contain probiotics. Probiotics can also be bought as a supplement at a pharmacy, health food store, or grocery store. Talk to your health care provider before starting any  supplement.  What are the side effects of probiotics?  Some people have side effects when taking probiotics. Side effects are usually temporary and may include:  · Gas.  · Bloating.  · Cramping.  Serious side effects are rare.  Follow these instructions at home:    · If you are taking probiotics with antibiotics:  ? Wait at least 2 hours between taking your medicine and the probiotic.  ? Eat foods high in fiber, such as whole grains, beans, and vegetables. These foods can help good bacteria grow.  ? Avoid certain foods as told by your health care provider.  Summary  · Probiotics are the good bacteria and yeasts that live in your body and keep you and your digestive system healthy.  · Certain foods, such as yogurt, contain probiotics.  · Probiotics can be taken as supplements. They can be bought at a pharmacy, health food store, or grocery store. They come in many different forms, strain combinations, and strengths.  · Be sure to talk with your health care provider before taking a probiotic supplement.  This information is not intended to replace advice given to you by your health care provider. Make sure you discuss any questions you have with your health care provider.  Document Released: 07/15/2015 Document Revised: 09/06/2019 Document Reviewed: 01/02/2019  [employer] Interactive Patient Education © 2020 [employer]    Low-FODMAP Eating Plan    FODMAPs (fermentable oligosaccharides, disaccharides, monosaccharides, and polyols) are sugars that are hard for some people to digest. A low-FODMAP eating plan may help some people who have bowel (intestinal) diseases to manage their symptoms.  This meal plan can be complicated to follow. Work with a diet and nutrition specialist (dietitian) to make a low-FODMAP eating plan that is right for you. A dietitian can make sure that you get enough nutrition from this diet.  What are tips for following this plan?  Reading food labels  · Check labels for hidden FODMAPs such  as:  ? High-fructose syrup.  ? Honey.  ? Agave.  ? Natural fruit flavors.  ? Onion or garlic powder.  · Choose low-FODMAP foods that contain 3-4 grams of fiber per serving.  · Check food labels for serving sizes. Eat only one serving at a time to make sure FODMAP levels stay low.  Meal planning  · Follow a low-FODMAP eating plan for up to 6 weeks, or as told by your health care provider or dietitian.  · To follow the eating plan:  1. Eliminate high-FODMAP foods from your diet completely.  2. Gradually reintroduce high-FODMAP foods into your diet one at a time. Most people should wait a few days after introducing one high-FODMAP food before they introduce the next high-FODMAP food. Your dietitian can recommend how quickly you may reintroduce foods.  3. Keep a daily record of what you eat and drink, and make note of any symptoms that you have after eating.  4. Review your daily record with a dietitian regularly. Your dietitian can help you identify which foods you can eat and which foods you should avoid.  General tips  · Drink enough fluid each day to keep your urine pale yellow.  · Avoid processed foods. These often have added sugar and may be high in FODMAPs.  · Avoid most dairy products, whole grains, and sweeteners.  · Work with a dietitian to make sure you get enough fiber in your diet.  Recommended foods  Grains  · Gluten-free grains, such as rice, oats, buckwheat, quinoa, corn, polenta, and millet. Gluten-free pasta, bread, or cereal. Rice noodles. Corn tortillas.  Vegetables  · Eggplant, zucchini, cucumber, peppers, green beans, [place] sprouts, bean sprouts, lettuce, arugula, kale, Swiss chard, spinach, alycia greens, bok siobhan, summer squash, potato, and tomato. Limited amounts of corn, carrot, and sweet potato. Green parts of scallions.  Fruits  · Bananas, oranges, cristina, limes, blueberries, raspberries, strawberries, grapes, cantaloupe, honeydew melon, kiwi, papaya, passion fruit, and pineapple. Limited  "amounts of dried cranberries, banana chips, and shredded coconut.  Dairy  · Lactose-free milk, yogurt, and kefir. Lactose-free cottage cheese and ice cream. Non-dairy milks, such as almond, coconut, hemp, and rice milk. Yogurts made of non-dairy milks. Limited amounts of goat cheese, brie, mozzarella, parmesan, swiss, and other hard cheeses.  Meats and other protein foods  · Unseasoned beef, pork, poultry, or fish. Eggs. Adam. Tofu (firm) and tempeh. Limited amounts of nuts and seeds, such as almonds, walnuts, brazil nuts, pecans, peanuts, pumpkin seeds, debbie seeds, and sunflower seeds.  Fats and oils  · Butter-free spreads. Vegetable oils, such as olive, canola, and sunflower oil.  Seasoning and other foods  · Artificial sweeteners with names that do not end in \"ol\" such as aspartame, saccharine, and stevia. Maple syrup, white table sugar, raw sugar, brown sugar, and molasses. Fresh basil, coriander, parsley, rosemary, and thyme.  Beverages  · Water and mineral water. Sugar-sweetened soft drinks. Small amounts of orange juice or cranberry juice. Black and green tea. Most dry yuridia. Coffee.  This may not be a complete list of low-FODMAP foods. Talk with your dietitian for more information.  Foods to avoid  Grains  · Wheat, including kamut, durum, and semolina. Barley and bulgur. Couscous. Wheat-based cereals. Wheat noodles, bread, crackers, and pastries.  Vegetables  · Chicory root, artichoke, asparagus, cabbage, snow peas, sugar snap peas, mushrooms, and cauliflower. Onions, garlic, leeks, and the white part of scallions.  Fruits  · Fresh, dried, and juiced forms of apple, pear, watermelon, peach, plum, cherries, apricots, blackberries, boysenberries, figs, nectarines, and praveen. Avocado.  Dairy  · Milk, yogurt, ice cream, and soft cheese. Cream and sour cream. Milk-based sauces. Custard.  Meats and other protein foods  · Fried or fatty meat. Sausage. Cashews and pistachios. Soybeans, baked beans, black beans, " chickpeas, kidney beans, dania beans, navy beans, lentils, and split peas.  Seasoning and other foods  · Any sugar-free gum or candy. Foods that contain artificial sweeteners such as sorbitol, mannitol, isomalt, or xylitol. Foods that contain honey, high-fructose corn syrup, or agave. Bouillon, vegetable stock, beef stock, and chicken stock. Garlic and onion powder. Condiments made with onion, such as hummus, chutney, pickles, relish, salad dressing, and salsa. Tomato paste.  Beverages  · Chicory-based drinks. Coffee substitutes. Chamomile tea. Fennel tea. Sweet or fortified yuridia such as port or fermín. Diet soft drinks made with isomalt, mannitol, maltitol, sorbitol, or xylitol. Apple, pear, and praveen juice. Juices with high-fructose corn syrup.  This may not be a complete list of high-FODMAP foods. Talk with your dietitian to discuss what dietary choices are best for you.   Summary  · A low-FODMAP eating plan is a short-term diet that eliminates FODMAPs from your diet to help ease symptoms of certain bowel diseases.  · The eating plan usually lasts up to 6 weeks. After that, high-FODMAP foods are restarted gradually, one at a time, so you can find out which may be causing symptoms.  · A low-FODMAP eating plan can be complicated. It is best to work with a dietitian who has experience with this type of plan.  This information is not intended to replace advice given to you by your health care provider. Make sure you discuss any questions you have with your health care provider.  Document Released: 08/14/2018 Document Revised: 08/14/2018 Document Reviewed: 08/14/2018  [employer] Interactive Patient Education © 2020 [employer]

## 2020-05-12 ENCOUNTER — HOSPITAL ENCOUNTER (OUTPATIENT)
Dept: CT IMAGING | Facility: HOSPITAL | Age: 35
Discharge: HOME | End: 2020-05-12
Admitting: NURSE PRACTITIONER

## 2020-05-12 DIAGNOSIS — R10.9 ABDOMINAL CRAMPING: ICD-10-CM

## 2020-05-12 DIAGNOSIS — K62.5 RECTAL BLEEDING: ICD-10-CM

## 2020-05-12 DIAGNOSIS — R19.7 DIARRHEA, UNSPECIFIED TYPE: ICD-10-CM

## 2020-05-12 DIAGNOSIS — R10.30 LOWER ABDOMINAL PAIN: ICD-10-CM

## 2020-05-12 LAB — CREAT BLDA-MCNC: 0.6 MG/DL (ref 0.6–1.3)

## 2020-05-12 PROCEDURE — 74178 CT ABD&PLV WO CNTR FLWD CNTR: CPT

## 2020-05-12 PROCEDURE — 82565 ASSAY OF CREATININE: CPT

## 2020-05-12 PROCEDURE — 25010000002 IOPAMIDOL 61 % SOLUTION: Performed by: NURSE PRACTITIONER

## 2020-05-12 RX ADMIN — IOPAMIDOL 50 ML: 612 INJECTION, SOLUTION INTRAVENOUS at 12:33

## 2020-05-12 RX ADMIN — IOPAMIDOL 100 ML: 612 INJECTION, SOLUTION INTRAVENOUS at 12:33

## 2020-05-13 ENCOUNTER — TELEPHONE (OUTPATIENT)
Dept: GASTROENTEROLOGY | Facility: CLINIC | Age: 35
End: 2020-05-13

## 2020-05-13 NOTE — TELEPHONE ENCOUNTER
----- Message from MARIA INES Mendieta sent at 5/13/2020  8:14 AM CDT -----  Please notify patient that CT of abdomen pelvis was essentially unremarkable.  It noted a 1.8 cm right ovarian follicle it noted IUD in place otherwise normal.  No acute bowel changes.  I think she should follow-up with her OB/GYN as she and I discussed on her visit

## 2020-05-13 NOTE — TELEPHONE ENCOUNTER
Pt called me back and I spoke to her re: results-she VU. Pt is going to call OB/GYN to discuss follicle/IUD further.  Pt advised to call me back with any further questions/problems.

## 2020-06-04 ENCOUNTER — OFFICE VISIT (OUTPATIENT)
Dept: GASTROENTEROLOGY | Facility: CLINIC | Age: 35
End: 2020-06-04

## 2020-06-04 VITALS
SYSTOLIC BLOOD PRESSURE: 124 MMHG | OXYGEN SATURATION: 100 % | DIASTOLIC BLOOD PRESSURE: 80 MMHG | HEART RATE: 77 BPM | TEMPERATURE: 99 F | HEIGHT: 66 IN | BODY MASS INDEX: 34.87 KG/M2 | WEIGHT: 217 LBS

## 2020-06-04 DIAGNOSIS — R11.0 NAUSEA: ICD-10-CM

## 2020-06-04 DIAGNOSIS — R10.13 EPIGASTRIC PAIN: Primary | ICD-10-CM

## 2020-06-04 DIAGNOSIS — K58.9 IRRITABLE BOWEL SYNDROME, UNSPECIFIED TYPE: ICD-10-CM

## 2020-06-04 DIAGNOSIS — K64.8 OTHER HEMORRHOIDS: ICD-10-CM

## 2020-06-04 DIAGNOSIS — R12 HEARTBURN: ICD-10-CM

## 2020-06-04 PROCEDURE — 99214 OFFICE O/P EST MOD 30 MIN: CPT | Performed by: NURSE PRACTITIONER

## 2020-06-04 RX ORDER — DICYCLOMINE HYDROCHLORIDE 10 MG/1
10 CAPSULE ORAL
COMMUNITY
End: 2020-06-04

## 2020-06-04 RX ORDER — HYDROCORTISONE ACETATE 25 MG/1
25 SUPPOSITORY RECTAL 2 TIMES DAILY PRN
Qty: 30 SUPPOSITORY | Refills: 0 | Status: SHIPPED | OUTPATIENT
Start: 2020-06-04 | End: 2021-03-15

## 2020-06-04 RX ORDER — PANTOPRAZOLE SODIUM 40 MG/1
40 TABLET, DELAYED RELEASE ORAL DAILY
Qty: 30 TABLET | Refills: 11 | Status: SHIPPED | OUTPATIENT
Start: 2020-06-04 | End: 2020-09-08

## 2020-06-04 RX ORDER — DESOGESTREL AND ETHINYL ESTRADIOL 0.15-0.03
1 KIT ORAL DAILY
COMMUNITY
Start: 2020-05-14 | End: 2021-03-15

## 2020-06-04 NOTE — H&P (VIEW-ONLY)
"Chief Complaint:   Chief Complaint   Patient presents with   • Follow-up     Pt c/o still having a lot of abdominal pain-sometimes epigastric pain and sometimes lower abdominal pain   • Nausea     Pt also states she is now nauseated-will last all day when it happens; Pt states this has been going on the last couple of weeks; Pt states it feels like food is \"just sitting in her throat\"-states eventually she will burp and will feel better          Patient ID: Ratna Flor is a 34 y.o. female     History of Present Illness: This is a very pleasant 34-year-old female who is here today with complaints of epigastric pain, lower abdominal cramping, and nausea.    Patient's last colonoscopy was performed on 5/22/2019 with findings of nonbleeding internal hemorrhoids otherwise normal patient instructed to repeat colonoscopy at age of 48 for screening purposes.  The patient states that her PCP had given her Bentyl which she has continued to take.  She states that she started a probiotic and has done a low FODMAP diet however she continues to have abdominal cramping.  She states that she has been having very bad hemorrhoids.  We discussed IBS as we did on last office visit.  She states that she does not necessarily feel constipated however \"I just have to go to the bathroom several times a day but have to push really hard to feel as though I get empty.\"  The patient tells me that she is wondering if some of her stress and anxiety could be causing her to have the bowel issues.  She is thinking about following up with her PCP to discuss possible antidepressant.    The patient states that she has also begun to have epigastric pain along with nausea.  She states that this is been occurring for the past 2 weeks.  She states at times she will have belching but that does not really relieve the pain.    The patient denies any nausea, vomiting, dysphagia, or hematemesis.  The patient denies any fever or chills.  Denies any melena or " hematochezia.  Denies any unintentional weight loss or loss of appetite.  The patient denies any shortness of breath, cough, recent or sudden loss of taste or smell,, myalgias, headache or sore throat.          History reviewed. No pertinent past medical history.    Past Surgical History:   Procedure Laterality Date   • COLONOSCOPY N/A 5/22/2019    Non-bleeding internal hemorrhoids; The examination was otherwise normal; No specimens collected; Repeat colonoscopy at age 48 for screening purposes   • WISDOM TOOTH EXTRACTION           Current Outpatient Medications:   •  APRI 0.15-30 MG-MCG per tablet, Take 1 tablet by mouth Daily., Disp: , Rfl:   •  Charcoal Activated 260 MG capsule, Take 2-3 capsules by mouth 3 (Three) Times a Day., Disp: , Rfl:   •  ibuprofen (ADVIL,MOTRIN) 200 MG tablet, Take 200 mg by mouth As Needed for Mild Pain ., Disp: , Rfl:   •  hydrocortisone (ANUSOL-HC) 25 MG suppository, Insert 1 suppository into the rectum 2 (Two) Times a Day As Needed for Hemorrhoids (rectal discomfort)., Disp: 30 suppository, Rfl: 0  •  pantoprazole (Protonix) 40 MG EC tablet, Take 1 tablet by mouth Daily., Disp: 30 tablet, Rfl: 11    No Known Allergies    Social History     Socioeconomic History   • Marital status:      Spouse name: Not on file   • Number of children: Not on file   • Years of education: Not on file   • Highest education level: Not on file   Tobacco Use   • Smoking status: Former Smoker     Types: Cigarettes   • Smokeless tobacco: Never Used   Substance and Sexual Activity   • Alcohol use: Yes     Frequency: Never     Comment: Occasional   • Drug use: No   • Sexual activity: Defer       Family History   Problem Relation Age of Onset   • Colon cancer Neg Hx    • Colon polyps Neg Hx    • Esophageal cancer Neg Hx    • Liver cancer Neg Hx    • Stomach cancer Neg Hx    • Rectal cancer Neg Hx    • Liver disease Neg Hx        Vitals:    06/04/20 1343   BP: 124/80   BP Location: Left arm   Patient  "Position: Sitting   Cuff Size: Adult   Pulse: 77   Temp: 99 °F (37.2 °C)   TempSrc: Tympanic   SpO2: 100%   Weight: 98.4 kg (217 lb)   Height: 167.6 cm (66\")       Review of Systems:    General:    Present -feeling well   Skin:    Not Present-Rash   HEENT:     Not Present-Acute visual changes or Acute hearing changes   Neck :    Not Present- swollen glands   Genitourinary:      Not Present- burning, frequency, urgency hematuria, dysuria,   Cardiovascular:   Not Present-chest pain, palpitations, or pressure   Respiratory:   Not Present- shortness of breath or cough   Gastrointestinal:  Musculoskeletal:  Neurological:  Psychiatric:   Present as mentioned in the HP    Not Present. Recent gait disturbances.    Not Present-Seizures and weakness in extremities.    Not Present- Anxiety or Depression.       Physical Exam:    General Appearance:    Alert, cooperative, in no acute distress   Psych:    Mood appropriate    Eyes:          conjunctivae and sclerae normal, no   icterus, no pallor   ENMT:    Ears appear intact with no abnormalities noted oral mucosa moist   Neck:   No adenopathy, supple, trachea midline, no thyromegaly, no   carotid bruit, no JVD    Cardiovascular:    Regular rhythm and normal rate, normal S1 and S2, no            murmur, no gallop, no rub, no click   Gastrointestinal:     Inspection normal.  Normal bowel sounds, no masses, no organomegaly, soft round non-tender, non-distended, no guarding, no rebound or tenderness. No hepatosplenomegaly.   Skin:   No bleeding, bruising or rash   Neurologic:   nonfocal       Lab Results - Last 18 Months   Lab Units 05/12/20  1220 04/08/20  1448 05/15/19  0951   GLUCOSE mg/dL  --  92 86   BUN mg/dL  --  12 9   CREATININE mg/dL 0.60 0.59 0.59   SODIUM mmol/L  --  144 143   POTASSIUM mmol/L  --  4.0 4.1   CHLORIDE mmol/L  --  105 108   CO2 mmol/L  --  26.0 25.0   TOTAL PROTEIN g/dL  --  8.2 7.7   ALBUMIN g/dL  --  4.50 4.50   ALT (SGPT) U/L  --  13 15   AST (SGOT) U/L "  --  19 29   ALK PHOS U/L  --  59 57   BILIRUBIN mg/dL  --  1.3* 1.2*   GLOBULIN gm/dL  --  3.7 3.2   SED RATE mm/hr  --  12  --        Lab Results - Last 18 Months   Lab Units 04/08/20  1448 05/15/19  0951   HEMOGLOBIN g/dL 14.1 11.9*   HEMATOCRIT % 41.4 36.6*   MCV fL 85.4 84.5   WBC 10*3/mm3 7.00 3.75*   RDW % 12.2* 12.7   MPV fL 9.3 10.4   PLATELETS 10*3/mm3 272 247       Lab Results - Last 18 Months   Lab Units 04/23/20  0829 05/15/19  0951   TSH uIU/mL 1.630 1.180        Body mass index is 35.02 kg/m². Patient's Body mass index is 35.02 kg/m². BMI is Patient's Body mass index is 35.02 kg/m². BMI is above normal parameters. Recommendations include: nutrition counseling.    Assessment and Plan:  Assessment/Plan   Ratna was seen today for follow-up and nausea.    Diagnoses and all orders for this visit:    Epigastric pain  -     Case Request; Standing  -     Case Request    Heartburn  -     Case Request; Standing  -     Case Request    Nausea  -     Case Request; Standing  -     Case Request    Irritable bowel syndrome, unspecified type    Other hemorrhoids    Other orders  -     pantoprazole (Protonix) 40 MG EC tablet; Take 1 tablet by mouth Daily.  -     Follow Anesthesia Guidelines / Protocol; Future  -     Obtain Informed Consent; Future  -     hydrocortisone (ANUSOL-HC) 25 MG suppository; Insert 1 suppository into the rectum 2 (Two) Times a Day As Needed for Hemorrhoids (rectal discomfort).      Will schedule patient for EGD.  Initiate Protonix.  I instructed the patient that if she did not feel the Bentyl was helping then to discontinue it.  Will send in Anusol suppositories.  Patient will have COVID protocol screening prior to procedure.  Education and instructions given.     There are no Patient Instructions on file for this visit.    Next follow-up appointment    The risks, benefits, and alternatives of endoscopy were reviewed with the patient today.  Risks including perforation, with or without  dilation, possibly requiring surgery.  Additional risks include risk of bleeding.  There is also the risk of a drug reaction or problems with anesthesia.  This will be discussed with the further by the anesthesia team on the day of the procedure. The benefits include the diagnosis and management of disease of the upper digestive tract.  Alternatives to endoscopy include upper GI series, laboratory testing, radiographic evaluation, or no intervention.  The patient verbalizes understanding and agrees.    EMR Dragon/Transcription disclaimer:  Much of this encounter note is an electronic transcription/translation of spoken language to printed text. The electronic translation of spoken language may permit erroneous, or at times, nonsensical words or phrases to be inadvertently transcribed; although I have reviewed the note for such errors, some may still exist.

## 2020-06-05 PROBLEM — R11.0 NAUSEA: Status: ACTIVE | Noted: 2020-06-05

## 2020-06-05 PROBLEM — R10.13 EPIGASTRIC PAIN: Status: ACTIVE | Noted: 2020-06-05

## 2020-06-05 PROBLEM — R12 HEARTBURN: Status: ACTIVE | Noted: 2020-06-05

## 2020-06-09 ENCOUNTER — TRANSCRIBE ORDERS (OUTPATIENT)
Dept: ADMINISTRATIVE | Facility: HOSPITAL | Age: 35
End: 2020-06-09

## 2020-06-09 DIAGNOSIS — Z01.818 PRE-OP TESTING: Primary | ICD-10-CM

## 2020-06-15 ENCOUNTER — LAB (OUTPATIENT)
Dept: LAB | Facility: HOSPITAL | Age: 35
End: 2020-06-15

## 2020-06-15 LAB — SARS-COV-2 RNA PNL SPEC NAA+PROBE: NOT DETECTED

## 2020-06-15 PROCEDURE — 87635 SARS-COV-2 COVID-19 AMP PRB: CPT | Performed by: INTERNAL MEDICINE

## 2020-06-17 ENCOUNTER — ANESTHESIA (OUTPATIENT)
Dept: GASTROENTEROLOGY | Facility: HOSPITAL | Age: 35
End: 2020-06-17

## 2020-06-17 ENCOUNTER — HOSPITAL ENCOUNTER (OUTPATIENT)
Facility: HOSPITAL | Age: 35
Setting detail: HOSPITAL OUTPATIENT SURGERY
Discharge: HOME | End: 2020-06-17
Attending: INTERNAL MEDICINE | Admitting: INTERNAL MEDICINE

## 2020-06-17 ENCOUNTER — ANESTHESIA EVENT (OUTPATIENT)
Dept: GASTROENTEROLOGY | Facility: HOSPITAL | Age: 35
End: 2020-06-17

## 2020-06-17 VITALS
DIASTOLIC BLOOD PRESSURE: 85 MMHG | BODY MASS INDEX: 34.87 KG/M2 | HEART RATE: 75 BPM | HEIGHT: 66 IN | TEMPERATURE: 98 F | SYSTOLIC BLOOD PRESSURE: 118 MMHG | OXYGEN SATURATION: 100 % | RESPIRATION RATE: 18 BRPM | WEIGHT: 217 LBS

## 2020-06-17 DIAGNOSIS — R10.13 EPIGASTRIC PAIN: ICD-10-CM

## 2020-06-17 DIAGNOSIS — R11.0 NAUSEA: ICD-10-CM

## 2020-06-17 DIAGNOSIS — R12 HEARTBURN: ICD-10-CM

## 2020-06-17 PROBLEM — K21.00 GASTROESOPHAGEAL REFLUX DISEASE WITH ESOPHAGITIS: Status: ACTIVE | Noted: 2020-06-05

## 2020-06-17 LAB — B-HCG UR QL: NEGATIVE

## 2020-06-17 PROCEDURE — 81025 URINE PREGNANCY TEST: CPT | Performed by: NURSE ANESTHETIST, CERTIFIED REGISTERED

## 2020-06-17 PROCEDURE — 87081 CULTURE SCREEN ONLY: CPT | Performed by: INTERNAL MEDICINE

## 2020-06-17 PROCEDURE — 43239 EGD BIOPSY SINGLE/MULTIPLE: CPT | Performed by: INTERNAL MEDICINE

## 2020-06-17 PROCEDURE — 25010000002 PROPOFOL 10 MG/ML EMULSION: Performed by: NURSE ANESTHETIST, CERTIFIED REGISTERED

## 2020-06-17 RX ORDER — MIDAZOLAM HYDROCHLORIDE 1 MG/ML
1 INJECTION INTRAMUSCULAR; INTRAVENOUS
Status: DISCONTINUED | OUTPATIENT
Start: 2020-06-17 | End: 2020-06-17 | Stop reason: HOSPADM

## 2020-06-17 RX ORDER — PROPOFOL 10 MG/ML
VIAL (ML) INTRAVENOUS AS NEEDED
Status: DISCONTINUED | OUTPATIENT
Start: 2020-06-17 | End: 2020-06-17 | Stop reason: SURG

## 2020-06-17 RX ORDER — LIDOCAINE HYDROCHLORIDE 10 MG/ML
0.5 INJECTION, SOLUTION EPIDURAL; INFILTRATION; INTRACAUDAL; PERINEURAL ONCE AS NEEDED
Status: DISCONTINUED | OUTPATIENT
Start: 2020-06-17 | End: 2020-06-17 | Stop reason: HOSPADM

## 2020-06-17 RX ORDER — SODIUM CHLORIDE 9 MG/ML
500 INJECTION, SOLUTION INTRAVENOUS CONTINUOUS PRN
Status: DISCONTINUED | OUTPATIENT
Start: 2020-06-17 | End: 2020-06-17 | Stop reason: HOSPADM

## 2020-06-17 RX ORDER — SODIUM CHLORIDE 0.9 % (FLUSH) 0.9 %
10 SYRINGE (ML) INJECTION AS NEEDED
Status: DISCONTINUED | OUTPATIENT
Start: 2020-06-17 | End: 2020-06-17 | Stop reason: HOSPADM

## 2020-06-17 RX ORDER — SODIUM CHLORIDE 9 MG/ML
100 INJECTION, SOLUTION INTRAVENOUS CONTINUOUS
Status: DISCONTINUED | OUTPATIENT
Start: 2020-06-17 | End: 2020-06-17 | Stop reason: HOSPADM

## 2020-06-17 RX ORDER — SODIUM CHLORIDE 0.9 % (FLUSH) 0.9 %
10 SYRINGE (ML) INJECTION EVERY 12 HOURS SCHEDULED
Status: DISCONTINUED | OUTPATIENT
Start: 2020-06-17 | End: 2020-06-17 | Stop reason: HOSPADM

## 2020-06-17 RX ADMIN — LIDOCAINE HYDROCHLORIDE 100 MG: 20 INJECTION, SOLUTION INTRAVENOUS at 07:50

## 2020-06-17 RX ADMIN — SODIUM CHLORIDE 100 ML/HR: 9 INJECTION, SOLUTION INTRAVENOUS at 08:12

## 2020-06-17 RX ADMIN — PROPOFOL 100 MG: 10 INJECTION, EMULSION INTRAVENOUS at 07:51

## 2020-06-17 RX ADMIN — PROPOFOL 50 MG: 10 INJECTION, EMULSION INTRAVENOUS at 07:54

## 2020-06-17 NOTE — ANESTHESIA POSTPROCEDURE EVALUATION
Patient: Ratna Flor    Procedure Summary     Date:  06/17/20 Room / Location:  [facility] ENDOSCOPY 5 / [facility] ENDOSCOPY    Anesthesia Start:  0748 Anesthesia Stop:  0758    Procedure:  ESOPHAGOGASTRODUODENOSCOPY WITH ANESTHESIA (N/A ) Diagnosis:       Epigastric pain      Heartburn      Nausea      (Epigastric pain [R10.13])      (Heartburn [R12])      (Nausea [R11.0])    Surgeon:  Octavia Mattson MD Provider:  SOPHIA Bauman CRNA    Anesthesia Type:  MAC ASA Status:  2          Anesthesia Type: MAC    Vitals  No vitals data found for the desired time range.          Post Anesthesia Care and Evaluation    Patient location during evaluation: PACU  Patient participation: complete - patient participated  Level of consciousness: awake and alert  Pain score: 0  Pain management: adequate  Airway patency: patent  Anesthetic complications: No anesthetic complications    Cardiovascular status: acceptable and stable  Respiratory status: acceptable and unassisted  Hydration status: acceptable

## 2020-06-17 NOTE — ANESTHESIA PREPROCEDURE EVALUATION
Anesthesia Evaluation     NPO Solid Status: > 8 hours  NPO Liquid Status: > 8 hours           Airway   No difficulty expected  Dental      Pulmonary    Cardiovascular         Neuro/Psych  GI/Hepatic/Renal/Endo    (+) obesity,  GERD,      Musculoskeletal     Abdominal    Substance History      OB/GYN          Other                        Anesthesia Plan    ASA 2     MAC     intravenous induction     Anesthetic plan, all risks, benefits, and alternatives have been provided, discussed and informed consent has been obtained with: patient.

## 2020-06-18 LAB — UREASE TISS QL: NEGATIVE

## 2020-09-05 DIAGNOSIS — K21.00 GASTROESOPHAGEAL REFLUX DISEASE WITH ESOPHAGITIS: Primary | ICD-10-CM

## 2020-09-08 RX ORDER — PANTOPRAZOLE SODIUM 40 MG/1
TABLET, DELAYED RELEASE ORAL
Qty: 90 TABLET | Refills: 1 | Status: ON HOLD | OUTPATIENT
Start: 2020-09-08 | End: 2021-03-18 | Stop reason: SDUPTHER

## 2021-03-15 ENCOUNTER — HOSPITAL ENCOUNTER (INPATIENT)
Facility: HOSPITAL | Age: 36
LOS: 3 days | Discharge: HOME | End: 2021-03-18
Attending: FAMILY MEDICINE | Admitting: INTERNAL MEDICINE

## 2021-03-15 DIAGNOSIS — K21.00 GASTROESOPHAGEAL REFLUX DISEASE WITH ESOPHAGITIS: ICD-10-CM

## 2021-03-15 DIAGNOSIS — D64.9 SYMPTOMATIC ANEMIA: ICD-10-CM

## 2021-03-15 DIAGNOSIS — K92.2 GASTROINTESTINAL HEMORRHAGE, UNSPECIFIED GASTROINTESTINAL HEMORRHAGE TYPE: Primary | ICD-10-CM

## 2021-03-15 DIAGNOSIS — D50.0 IRON DEFICIENCY ANEMIA DUE TO CHRONIC BLOOD LOSS: ICD-10-CM

## 2021-03-15 LAB
ABO GROUP BLD: NORMAL
ACANTHOCYTES BLD QL SMEAR: ABNORMAL
ALBUMIN SERPL-MCNC: 4.3 G/DL (ref 3.5–5.2)
ALBUMIN/GLOB SERPL: 1.7 G/DL
ALP SERPL-CCNC: 46 U/L (ref 39–117)
ALT SERPL W P-5'-P-CCNC: 10 U/L (ref 1–33)
ANION GAP SERPL CALCULATED.3IONS-SCNC: 10 MMOL/L (ref 5–15)
ANISOCYTOSIS BLD QL: ABNORMAL
APTT PPP: 29.6 SECONDS (ref 24.1–35)
AST SERPL-CCNC: 18 U/L (ref 1–32)
B-HCG UR QL: NEGATIVE
BASOPHILS # BLD MANUAL: 0.13 10*3/MM3 (ref 0–0.2)
BASOPHILS NFR BLD AUTO: 2 % (ref 0–1.5)
BILIRUB SERPL-MCNC: 0.9 MG/DL (ref 0–1.2)
BILIRUB UR QL STRIP: NEGATIVE
BLD GP AB SCN SERPL QL: NEGATIVE
BUN SERPL-MCNC: 9 MG/DL (ref 6–20)
BUN/CREAT SERPL: 16.4 (ref 7–25)
CALCIUM SPEC-SCNC: 9.2 MG/DL (ref 8.6–10.5)
CHLORIDE SERPL-SCNC: 106 MMOL/L (ref 98–107)
CLARITY UR: CLEAR
CO2 SERPL-SCNC: 24 MMOL/L (ref 22–29)
COLOR UR: YELLOW
CREAT SERPL-MCNC: 0.55 MG/DL (ref 0.57–1)
DACRYOCYTES BLD QL SMEAR: ABNORMAL
DEPRECATED RDW RBC AUTO: 38.5 FL (ref 37–54)
DEVELOPER EXPIRATION DATE: ABNORMAL
DEVELOPER LOT NUMBER: 187
ELLIPTOCYTES BLD QL SMEAR: ABNORMAL
EOSINOPHIL # BLD MANUAL: 0.06 10*3/MM3 (ref 0–0.4)
EOSINOPHIL NFR BLD MANUAL: 1 % (ref 0.3–6.2)
ERYTHROCYTE [DISTWIDTH] IN BLOOD BY AUTOMATED COUNT: 18 % (ref 12.3–15.4)
EXPIRATION DATE: ABNORMAL
FECAL OCCULT BLOOD SCREEN, POC: POSITIVE
FERRITIN SERPL-MCNC: 2.49 NG/ML (ref 13–150)
GFR SERPL CREATININE-BSD FRML MDRD: 124 ML/MIN/1.73
GIANT PLATELETS: ABNORMAL
GLOBULIN UR ELPH-MCNC: 2.6 GM/DL
GLUCOSE SERPL-MCNC: 105 MG/DL (ref 65–99)
GLUCOSE UR STRIP-MCNC: NEGATIVE MG/DL
HCT VFR BLD AUTO: 16.5 % (ref 34–46.6)
HGB BLD-MCNC: 4.4 G/DL (ref 12–15.9)
HGB UR QL STRIP.AUTO: NEGATIVE
HYPOCHROMIA BLD QL: ABNORMAL
INR PPP: 1.23 (ref 0.91–1.09)
IRON 24H UR-MRATE: 10 MCG/DL (ref 37–145)
IRON SATN MFR SERPL: 2 % (ref 20–50)
KETONES UR QL STRIP: NEGATIVE
LEUKOCYTE ESTERASE UR QL STRIP.AUTO: NEGATIVE
LYMPHOCYTES # BLD MANUAL: 1.16 10*3/MM3 (ref 0.7–3.1)
LYMPHOCYTES NFR BLD MANUAL: 18 % (ref 19.6–45.3)
LYMPHOCYTES NFR BLD MANUAL: 3 % (ref 5–12)
Lab: 187
MCH RBC QN AUTO: 16.1 PG (ref 26.6–33)
MCHC RBC AUTO-ENTMCNC: 26.7 G/DL (ref 31.5–35.7)
MCV RBC AUTO: 60.4 FL (ref 79–97)
MONOCYTES # BLD AUTO: 0.19 10*3/MM3 (ref 0.1–0.9)
NEGATIVE CONTROL: NEGATIVE
NEUTROPHILS # BLD AUTO: 4.92 10*3/MM3 (ref 1.7–7)
NEUTROPHILS NFR BLD MANUAL: 76 % (ref 42.7–76)
NITRITE UR QL STRIP: NEGATIVE
PH UR STRIP.AUTO: 6.5 [PH] (ref 5–8)
PLATELET # BLD AUTO: 463 10*3/MM3 (ref 140–450)
PMV BLD AUTO: 10.5 FL (ref 6–12)
POIKILOCYTOSIS BLD QL SMEAR: ABNORMAL
POLYCHROMASIA BLD QL SMEAR: ABNORMAL
POSITIVE CONTROL: POSITIVE
POTASSIUM SERPL-SCNC: 4.1 MMOL/L (ref 3.5–5.2)
PROT SERPL-MCNC: 6.9 G/DL (ref 6–8.5)
PROT UR QL STRIP: NEGATIVE
PROTHROMBIN TIME: 15.1 SECONDS (ref 11.9–14.6)
RBC # BLD AUTO: 2.73 10*6/MM3 (ref 3.77–5.28)
RETICS # AUTO: 0.04 10*6/MM3 (ref 0.02–0.13)
RETICS/RBC NFR AUTO: 1.61 % (ref 0.7–1.9)
RH BLD: POSITIVE
SARS-COV-2 RNA PNL SPEC NAA+PROBE: NOT DETECTED
SMALL PLATELETS BLD QL SMEAR: ABNORMAL
SODIUM SERPL-SCNC: 140 MMOL/L (ref 136–145)
SP GR UR STRIP: 1.01 (ref 1–1.03)
STOMATOCYTES BLD QL SMEAR: ABNORMAL
T&S EXPIRATION DATE: NORMAL
TIBC SERPL-MCNC: 586 MCG/DL (ref 298–536)
TRANSFERRIN SERPL-MCNC: 393 MG/DL (ref 200–360)
UROBILINOGEN UR QL STRIP: NORMAL
WBC # BLD AUTO: 6.47 10*3/MM3 (ref 3.4–10.8)
WBC MORPH BLD: NORMAL

## 2021-03-15 PROCEDURE — 82728 ASSAY OF FERRITIN: CPT | Performed by: FAMILY MEDICINE

## 2021-03-15 PROCEDURE — 99284 EMERGENCY DEPT VISIT MOD MDM: CPT

## 2021-03-15 PROCEDURE — 81025 URINE PREGNANCY TEST: CPT | Performed by: FAMILY MEDICINE

## 2021-03-15 PROCEDURE — 82607 VITAMIN B-12: CPT | Performed by: FAMILY MEDICINE

## 2021-03-15 PROCEDURE — 85730 THROMBOPLASTIN TIME PARTIAL: CPT | Performed by: FAMILY MEDICINE

## 2021-03-15 PROCEDURE — 84466 ASSAY OF TRANSFERRIN: CPT | Performed by: FAMILY MEDICINE

## 2021-03-15 PROCEDURE — 82270 OCCULT BLOOD FECES: CPT | Performed by: FAMILY MEDICINE

## 2021-03-15 PROCEDURE — 86900 BLOOD TYPING SEROLOGIC ABO: CPT

## 2021-03-15 PROCEDURE — P9016 RBC LEUKOCYTES REDUCED: HCPCS

## 2021-03-15 PROCEDURE — 86900 BLOOD TYPING SEROLOGIC ABO: CPT | Performed by: FAMILY MEDICINE

## 2021-03-15 PROCEDURE — 86901 BLOOD TYPING SEROLOGIC RH(D): CPT

## 2021-03-15 PROCEDURE — 80053 COMPREHEN METABOLIC PANEL: CPT | Performed by: FAMILY MEDICINE

## 2021-03-15 PROCEDURE — 93010 ELECTROCARDIOGRAM REPORT: CPT | Performed by: INTERNAL MEDICINE

## 2021-03-15 PROCEDURE — 86920 COMPATIBILITY TEST SPIN: CPT

## 2021-03-15 PROCEDURE — 86850 RBC ANTIBODY SCREEN: CPT | Performed by: FAMILY MEDICINE

## 2021-03-15 PROCEDURE — 93005 ELECTROCARDIOGRAM TRACING: CPT | Performed by: FAMILY MEDICINE

## 2021-03-15 PROCEDURE — 82150 ASSAY OF AMYLASE: CPT | Performed by: FAMILY MEDICINE

## 2021-03-15 PROCEDURE — 36430 TRANSFUSION BLD/BLD COMPNT: CPT

## 2021-03-15 PROCEDURE — 93005 ELECTROCARDIOGRAM TRACING: CPT

## 2021-03-15 PROCEDURE — 82746 ASSAY OF FOLIC ACID SERUM: CPT | Performed by: FAMILY MEDICINE

## 2021-03-15 PROCEDURE — 83690 ASSAY OF LIPASE: CPT | Performed by: FAMILY MEDICINE

## 2021-03-15 PROCEDURE — 87635 SARS-COV-2 COVID-19 AMP PRB: CPT | Performed by: FAMILY MEDICINE

## 2021-03-15 PROCEDURE — 85045 AUTOMATED RETICULOCYTE COUNT: CPT | Performed by: FAMILY MEDICINE

## 2021-03-15 PROCEDURE — 85007 BL SMEAR W/DIFF WBC COUNT: CPT | Performed by: FAMILY MEDICINE

## 2021-03-15 PROCEDURE — 86901 BLOOD TYPING SEROLOGIC RH(D): CPT | Performed by: FAMILY MEDICINE

## 2021-03-15 PROCEDURE — 81003 URINALYSIS AUTO W/O SCOPE: CPT | Performed by: FAMILY MEDICINE

## 2021-03-15 PROCEDURE — 83540 ASSAY OF IRON: CPT | Performed by: FAMILY MEDICINE

## 2021-03-15 PROCEDURE — 86140 C-REACTIVE PROTEIN: CPT | Performed by: FAMILY MEDICINE

## 2021-03-15 PROCEDURE — 85025 COMPLETE CBC W/AUTO DIFF WBC: CPT | Performed by: FAMILY MEDICINE

## 2021-03-15 PROCEDURE — 85610 PROTHROMBIN TIME: CPT | Performed by: FAMILY MEDICINE

## 2021-03-15 RX ORDER — ONDANSETRON 2 MG/ML
4 INJECTION INTRAMUSCULAR; INTRAVENOUS EVERY 6 HOURS PRN
Status: DISCONTINUED | OUTPATIENT
Start: 2021-03-15 | End: 2021-03-18 | Stop reason: HOSPADM

## 2021-03-15 RX ORDER — SODIUM CHLORIDE 0.9 % (FLUSH) 0.9 %
10 SYRINGE (ML) INJECTION AS NEEDED
Status: DISCONTINUED | OUTPATIENT
Start: 2021-03-15 | End: 2021-03-18 | Stop reason: HOSPADM

## 2021-03-15 RX ORDER — PANTOPRAZOLE SODIUM 40 MG/10ML
80 INJECTION, POWDER, LYOPHILIZED, FOR SOLUTION INTRAVENOUS ONCE
Status: COMPLETED | OUTPATIENT
Start: 2021-03-15 | End: 2021-03-15

## 2021-03-15 RX ORDER — DIPHENHYDRAMINE HCL 25 MG
25 CAPSULE ORAL NIGHTLY PRN
Status: DISCONTINUED | OUTPATIENT
Start: 2021-03-15 | End: 2021-03-18 | Stop reason: HOSPADM

## 2021-03-15 RX ORDER — ACETAMINOPHEN 325 MG/1
650 TABLET ORAL EVERY 4 HOURS PRN
Status: DISCONTINUED | OUTPATIENT
Start: 2021-03-15 | End: 2021-03-18 | Stop reason: HOSPADM

## 2021-03-15 RX ORDER — PANTOPRAZOLE SODIUM 40 MG/1
40 TABLET, DELAYED RELEASE ORAL
Status: DISCONTINUED | OUTPATIENT
Start: 2021-03-16 | End: 2021-03-18 | Stop reason: HOSPADM

## 2021-03-15 RX ORDER — SODIUM CHLORIDE 0.9 % (FLUSH) 0.9 %
10 SYRINGE (ML) INJECTION EVERY 12 HOURS SCHEDULED
Status: DISCONTINUED | OUTPATIENT
Start: 2021-03-15 | End: 2021-03-18 | Stop reason: HOSPADM

## 2021-03-15 RX ORDER — SODIUM CHLORIDE 9 MG/ML
50 INJECTION, SOLUTION INTRAVENOUS CONTINUOUS
Status: DISCONTINUED | OUTPATIENT
Start: 2021-03-15 | End: 2021-03-17

## 2021-03-15 RX ADMIN — PANTOPRAZOLE SODIUM 80 MG: 40 INJECTION, POWDER, LYOPHILIZED, FOR SOLUTION INTRAVENOUS at 20:33

## 2021-03-16 ENCOUNTER — APPOINTMENT (OUTPATIENT)
Dept: CT IMAGING | Facility: HOSPITAL | Age: 36
End: 2021-03-16

## 2021-03-16 PROBLEM — D50.0 IRON DEFICIENCY ANEMIA DUE TO CHRONIC BLOOD LOSS: Status: ACTIVE | Noted: 2021-03-16

## 2021-03-16 LAB
ANION GAP SERPL CALCULATED.3IONS-SCNC: 10 MMOL/L (ref 5–15)
BASOPHILS # BLD AUTO: 0.05 10*3/MM3 (ref 0–0.2)
BASOPHILS NFR BLD AUTO: 1 % (ref 0–1.5)
BUN SERPL-MCNC: 9 MG/DL (ref 6–20)
BUN/CREAT SERPL: 14.8 (ref 7–25)
CALCIUM SPEC-SCNC: 9.1 MG/DL (ref 8.6–10.5)
CHLORIDE SERPL-SCNC: 107 MMOL/L (ref 98–107)
CO2 SERPL-SCNC: 24 MMOL/L (ref 22–29)
CREAT SERPL-MCNC: 0.61 MG/DL (ref 0.57–1)
DEPRECATED RDW RBC AUTO: 47.5 FL (ref 37–54)
EOSINOPHIL # BLD AUTO: 0.08 10*3/MM3 (ref 0–0.4)
EOSINOPHIL NFR BLD AUTO: 1.6 % (ref 0.3–6.2)
ERYTHROCYTE [DISTWIDTH] IN BLOOD BY AUTOMATED COUNT: 21.9 % (ref 12.3–15.4)
FOLATE SERPL-MCNC: 16.2 NG/ML (ref 4.78–24.2)
GFR SERPL CREATININE-BSD FRML MDRD: 110 ML/MIN/1.73
GLUCOSE SERPL-MCNC: 94 MG/DL (ref 65–99)
HCT VFR BLD AUTO: 19.4 % (ref 34–46.6)
HCT VFR BLD AUTO: 21.6 % (ref 34–46.6)
HGB BLD-MCNC: 5.5 G/DL (ref 12–15.9)
HGB BLD-MCNC: 6.2 G/DL (ref 12–15.9)
LYMPHOCYTES # BLD AUTO: 1.87 10*3/MM3 (ref 0.7–3.1)
LYMPHOCYTES NFR BLD AUTO: 38.1 % (ref 19.6–45.3)
MCH RBC QN AUTO: 18 PG (ref 26.6–33)
MCHC RBC AUTO-ENTMCNC: 28.4 G/DL (ref 31.5–35.7)
MCV RBC AUTO: 63.4 FL (ref 79–97)
MONOCYTES # BLD AUTO: 0.59 10*3/MM3 (ref 0.1–0.9)
MONOCYTES NFR BLD AUTO: 12 % (ref 5–12)
NEUTROPHILS NFR BLD AUTO: 2.31 10*3/MM3 (ref 1.7–7)
NEUTROPHILS NFR BLD AUTO: 47.1 % (ref 42.7–76)
PLATELET # BLD AUTO: 426 10*3/MM3 (ref 140–450)
PMV BLD AUTO: 10.5 FL (ref 6–12)
POTASSIUM SERPL-SCNC: 4 MMOL/L (ref 3.5–5.2)
QT INTERVAL: 344 MS
QTC INTERVAL: 441 MS
RBC # BLD AUTO: 3.06 10*6/MM3 (ref 3.77–5.28)
SODIUM SERPL-SCNC: 141 MMOL/L (ref 136–145)
VIT B12 BLD-MCNC: 639 PG/ML (ref 211–946)
WBC # BLD AUTO: 4.91 10*3/MM3 (ref 3.4–10.8)

## 2021-03-16 PROCEDURE — 74178 CT ABD&PLV WO CNTR FLWD CNTR: CPT

## 2021-03-16 PROCEDURE — 36430 TRANSFUSION BLD/BLD COMPNT: CPT

## 2021-03-16 PROCEDURE — 86900 BLOOD TYPING SEROLOGIC ABO: CPT

## 2021-03-16 PROCEDURE — P9016 RBC LEUKOCYTES REDUCED: HCPCS

## 2021-03-16 PROCEDURE — 25010000002 IOPAMIDOL 61 % SOLUTION: Performed by: INTERNAL MEDICINE

## 2021-03-16 PROCEDURE — 99252 IP/OBS CONSLTJ NEW/EST SF 35: CPT | Performed by: INTERNAL MEDICINE

## 2021-03-16 PROCEDURE — 25010000002 ONDANSETRON PER 1 MG: Performed by: FAMILY MEDICINE

## 2021-03-16 PROCEDURE — 25010000002 FUROSEMIDE PER 20 MG: Performed by: NURSE PRACTITIONER

## 2021-03-16 PROCEDURE — 85025 COMPLETE CBC W/AUTO DIFF WBC: CPT | Performed by: FAMILY MEDICINE

## 2021-03-16 PROCEDURE — 85014 HEMATOCRIT: CPT | Performed by: INTERNAL MEDICINE

## 2021-03-16 PROCEDURE — 85018 HEMOGLOBIN: CPT | Performed by: INTERNAL MEDICINE

## 2021-03-16 PROCEDURE — 80048 BASIC METABOLIC PNL TOTAL CA: CPT | Performed by: FAMILY MEDICINE

## 2021-03-16 RX ORDER — FUROSEMIDE 10 MG/ML
20 INJECTION INTRAMUSCULAR; INTRAVENOUS ONCE
Status: COMPLETED | OUTPATIENT
Start: 2021-03-16 | End: 2021-03-16

## 2021-03-16 RX ADMIN — SODIUM CHLORIDE 50 ML/HR: 9 INJECTION, SOLUTION INTRAVENOUS at 12:38

## 2021-03-16 RX ADMIN — PANTOPRAZOLE SODIUM 40 MG: 40 TABLET, DELAYED RELEASE ORAL at 16:45

## 2021-03-16 RX ADMIN — SODIUM CHLORIDE, PRESERVATIVE FREE 10 ML: 5 INJECTION INTRAVENOUS at 08:47

## 2021-03-16 RX ADMIN — PANTOPRAZOLE SODIUM 40 MG: 40 TABLET, DELAYED RELEASE ORAL at 09:30

## 2021-03-16 RX ADMIN — ACETAMINOPHEN 650 MG: 325 TABLET ORAL at 17:50

## 2021-03-16 RX ADMIN — IOPAMIDOL 100 ML: 612 INJECTION, SOLUTION INTRAVENOUS at 20:52

## 2021-03-16 RX ADMIN — ONDANSETRON HYDROCHLORIDE 4 MG: 2 SOLUTION INTRAMUSCULAR; INTRAVENOUS at 14:22

## 2021-03-16 RX ADMIN — SODIUM CHLORIDE 100 ML/HR: 9 INJECTION, SOLUTION INTRAVENOUS at 00:52

## 2021-03-16 RX ADMIN — FUROSEMIDE 20 MG: 10 INJECTION, SOLUTION INTRAMUSCULAR; INTRAVENOUS at 16:45

## 2021-03-16 RX ADMIN — IOPAMIDOL 50 ML: 612 INJECTION, SOLUTION INTRAVENOUS at 17:52

## 2021-03-16 NOTE — PAYOR COMM NOTE
"Ratna Flor (35 y.o. Female) [id number]    ADDITIONAL CLINICAL    [facility] phone     Fax        Date of Birth Social Security Number Address Home Phone MRN    [id number]  [address]   [facility] [address] [phone] [id number]    [employer] Mar[employer] Status          Other        Admission Date Admission Type Admitting Provider Attending Provider Department, Room/Bed    3/15/21 Emergency Rashaad Shelby MD [place], Glenn Riego, MD [facility] 3C, 379/1    Discharge Date Discharge Disposition Discharge Destination                       Attending Provider: Rashaad Shelby MD    Allergies: No Known Allergies    Isolation: None   Infection: None   Code Status: CPR    Ht: 167.6 cm (66\")   Wt: 103 kg (226 lb)    Admission Cmt: None   Principal Problem: None                Active Insurance as of 3/15/2021     Primary Coverage     Payor Plan Insurance Group Employer/Plan Group    [facility] [employer] BLUE CROSS [employer] EM[employer] [id number]     Payor Plan Address Payor Plan Phone Number Payor Plan Fax Number Effective Dates    [address] [phone]  10/1/2018 - None Entered    [place] [address]       Subscriber Name Subscriber Birth Date Member ID       RATNA FLOR 1985 [id number]                 Emergency Contacts      (Rel.) Home Phone Work Phone Mobile Phone    BONNIE FLOR (Spouse) [phone] -- [phone]               Physician Progress Notes (last 24 hours) (Notes from 03/15/21 1403 through 03/16/21 1403)      Jaja Geronimo, APRN at 03/16/21 1333              [facility] Medicine Services  INPATIENT PROGRESS NOTE    Length of Stay: 1  Date of Admission: 3/15/2021  Primary Care Physician: Mono Tiwari MD    Subjective   Chief Complaint: Weakness, found to be anemic  HPI   To urgent care with complaints of weakness worsening over the past month.  " Hemoglobin 4.4 was noted to be 14.1 on 4/8/2020.    Complains of lower abdominal pressure and feels as though she has to force to push to have a bowel movement.  She does report some pain with bowel movements.  Occasionally notes blood in stool and has noted some blood clots.  She describes pain in her upper abdomen that travels down her abdomen.  Hemoglobin 4.4.  She has received 2 units packed red blood cells globin 6.2.  Transfused 2 additional packed red blood cells. Iron deficiency anemia with MCV 63.4 iron 10 with saturation 2%.  Will need IV Venofer but will elect to transfuse packed cells first.  GI consulted and awaiting recommendations.  She denies nausea or vomiting.  She denies chest pain but complains of being weak and fatigued.  Reports a history of hemorrhoids.  She denies fever or chills.    Review of Systems   Constitutional: Positive for activity change and fatigue. Negative for chills and fever.   HENT: Negative for congestion and trouble swallowing.    Eyes: Negative for photophobia and visual disturbance.   Respiratory: Positive for shortness of breath. Negative for cough and wheezing.    Cardiovascular: Negative for chest pain, palpitations and leg swelling.   Gastrointestinal: Positive for abdominal pain (Lower abdominal pressure), blood in stool and rectal pain. Negative for nausea and vomiting.   Endocrine: Negative for cold intolerance, heat intolerance and polyuria.   Genitourinary: Negative for dysuria, frequency and urgency.   Musculoskeletal: Negative for back pain and gait problem.   Skin: Negative for color change, pallor, rash and wound.   Allergic/Immunologic: Negative for immunocompromised state.   Neurological: Positive for weakness.   Hematological: Negative for adenopathy. Does not bruise/bleed easily.   Psychiatric/Behavioral: Negative for agitation, behavioral problems and confusion.      All pertinent negatives and positives are as above. All other systems have been reviewed  and are negative unless otherwise stated.     Objective    Temp:  [97.4 °F (36.3 °C)-99.1 °F (37.3 °C)] 98.3 °F (36.8 °C)  Heart Rate:  [] 67  Resp:  [14-18] 18  BP: (100-139)/(48-80) 126/55  Physical Exam  Vitals and nursing note reviewed.   Constitutional:       Appearance: Normal appearance.      Comments: Lying in bed.  No oxygen in use.   HENT:      Head: Normocephalic and atraumatic.      Nose: No congestion.      Mouth/Throat:      Pharynx: No oropharyngeal exudate.   Eyes:      Extraocular Movements: Extraocular movements intact.      Pupils: Pupils are equal, round, and reactive to light.   Cardiovascular:      Rate and Rhythm: Normal rate and regular rhythm.      Heart sounds: No murmur.   Pulmonary:      Breath sounds: No wheezing, rhonchi or rales.      Comments: No oxygen in use.  Abdominal:      Palpations: Abdomen is soft.      Tenderness: There is no abdominal tenderness.   Genitourinary:     Comments: Voiding.  Musculoskeletal:         General: No swelling or tenderness.      Cervical back: Normal range of motion and neck supple.   Skin:     General: Skin is warm and dry.   Neurological:      General: No focal deficit present.      Mental Status: She is alert and oriented to person, place, and time.   Psychiatric:         Mood and Affect: Mood normal.         Behavior: Behavior normal.         Thought Content: Thought content normal.         Judgment: Judgment normal.       Results Review:  I have reviewed the labs, radiology results, and diagnostic studies.    Laboratory Data:      Results from last 7 days   Lab Units 03/16/21  0944 03/16/21  0246 03/15/21  1855 03/15/21  1613   WBC 10*3/mm3  --  4.91 6.47 6.65   HEMOGLOBIN g/dL 6.2* 5.5* 4.4* 4.6*   HEMATOCRIT % 21.6* 19.4* 16.5* 17.4*   PLATELETS 10*3/mm3  --  426 463* 488*        Results from last 7 days   Lab Units 03/16/21  0246 03/15/21  1855 03/15/21  1613   SODIUM mmol/L 141 140 138   POTASSIUM mmol/L 4.0 4.1 4.2   CHLORIDE mmol/L 107  106 105   CO2 mmol/L 24.0 24.0 23.0   BUN mg/dL 9 9 10   CREATININE mg/dL 0.61 0.55* 0.55*   GLUCOSE mg/dL 94 105* 94   CALCIUM mg/dL 9.1 9.2 9.5   ALT (SGPT) U/L  --  10 9     Culture Data:    No results found for: BLOODCX, URINECX, WOUNDCX, MRSACX, RESPCX, STOOLCX    Radiology Data:   Imaging Results (Last 7 Days)     ** No results found for the last 168 hours. **        Intake/Output    Intake/Output Summary (Last 24 hours) at 3/16/2021 1333  Last data filed at 3/16/2021 0852  Gross per 24 hour   Intake 580 ml   Output --   Net 580 ml       Scheduled Meds  furosemide, 20 mg, Intravenous, Once  pantoprazole, 40 mg, Oral, BID AC  sodium chloride, 10 mL, Intravenous, Q12H      I have reviewed the patient current medications.     Assessment/Plan     Active Hospital Problems    Diagnosis    • Iron deficiency anemia due to chronic blood loss    • Gastrointestinal hemorrhage    • Symptomatic anemia    • Gastroesophageal reflux disease with esophagitis      Seen on endoscopy June 2020.  Patient started on PPI.     • Rectal bleeding      Added automatically from request for surgery [id number]     • Other hemorrhoids      Added automatically from request for surgery [id number]       Plan:  1.  To urgent care 3/15 with weakness and shortness of breath worsening over the past month.  Hemoglobin 4.4 with normal hemoglobin 14.1 4/8/2020.  Reports lower abdominal pressure and sensation to bear down forcefully for bowel movement.  2.  Hemoglobin 4.4.  Transfused 2 units packed cells.  Hemoglobin 6.2.  Transfuse 2 additional packed cells.  Hemoglobin posttransfusion.  Lasix 20 mg IV between units.  3.  Protonix twice daily  4.  GI consult.  5.  IV fluids at 100 mL/h.  Decrease to 50 mL/h while blood infusing.  6.  Iron panel iron 10, saturation 2%, ferritin 2.49, transferrin 393, TIBC 586.  We will give IV Venofer but she is receiving a total of 4 units of packed red blood cells so will defer Venofer until tomorrow.  7.  Fecal occult  blood positive  8.  SCDs for deep vein thrombosis prophylaxis  9.  No home medications.  10.  Has appointment Danelle Chapa MD 4/18/2021.  Changing PCP    Her  will assist with decision-making if she is unable to make decisions for herself  The above documentation resulted from a face-to-face encounter by me Jaja SPANN, [facility].    Discharge Planning: I expect patient to be discharged to home in 2 days.    Electronically signed by MARIA INES Li, 3/16/2021, 13:33 CDT.        Electronically signed by Jaja Geronimo APRN at 03/16/21 6575

## 2021-03-16 NOTE — H&P
[facility] Medicine Services  HISTORY AND PHYSICAL    Date of Admission: 3/15/2021  Primary Care Physician: Gladys Irizarry MD    Subjective     Chief Complaint: Weakness    History of Present Illness  35 year old female that went to urgent care due to weakness and shortness of breath progressively worsening over a month. She had blood work and was advised to come to the ER due to low hemoglobin. Her value is 4.4, with a normal in April of last year.    She states that for about a year has felt lower abdominal pressure and need to bear down to move her bowels. Also experienced pain with bowel movements. Has noted some dark stools and some blood with stools at times.     Her periods are regular and light.     Review of Systems   Otherwise complete ROS reviewed and negative except as mentioned in the HPI.    Past Medical History:   Past Medical History:   Diagnosis Date   • Anxiety    • GERD (gastroesophageal reflux disease)    • Hemorrhoids      Past Surgical History:  Past Surgical History:   Procedure Laterality Date   • COLONOSCOPY N/A 5/22/2019    Non-bleeding internal hemorrhoids; The examination was otherwise normal; No specimens collected; Repeat colonoscopy at age 48 for screening purposes   • ENDOSCOPY N/A 6/17/2020    Procedure: ESOPHAGOGASTRODUODENOSCOPY WITH ANESTHESIA;  Surgeon: Octavia Mattson MD;  Location: [facility] ENDOSCOPY;  Service: Gastroenterology;  Laterality: N/A;  pre epigastric pain  post esophagitis  dr gladys irizarry   • WISDOM TOOTH EXTRACTION       Social History:  reports that she has quit smoking. Her smoking use included cigarettes. She has never used smokeless tobacco. She reports current alcohol use. She reports that she does not use drugs.    Family History: family history is not on file.  Denies celiac disease.    Allergies:  No Known Allergies     Medications:  Prior to Admission medications    Medication Sig Start Date End Date Taking? Authorizing  "Provider   Citalopram Hydrobromide (CELEXA PO) Take  by mouth Daily. DOSE UNKNOWN   Yes Emergency, Nurse AMARI Brumfield   cloNIDine (CATAPRES) 0.1 MG tablet Take 0.1 mg by mouth Daily.   Yes Emergency, Nurse AMARI Brumfield   hydrOXYzine Pamoate (VISTARIL PO) Take  by mouth. DOSE UNKNOWN   Yes Emergency, Nurse Octaviano RN   ondansetron ODT (ZOFRAN-ODT) 8 MG disintegrating tablet Place 1 tablet on the tongue Every 8 (Eight) Hours As Needed for Nausea or Vomiting. 3/15/21  Yes Hans Sheehan MD   pantoprazole (PROTONIX) 40 MG EC tablet TAKE 1 TABLET BY MOUTH EVERY DAY 9/8/20  Yes Gladis Whitten APRN   APRI 0.15-30 MG-MCG per tablet Take 1 tablet by mouth Daily. 5/14/20 3/15/21  Jose Dixon MD   Charcoal Activated 260 MG capsule Take 2-3 capsules by mouth 3 (Three) Times a Day.  3/15/21  Jose Dixon MD   hydrocortisone (ANUSOL-HC) 25 MG suppository Insert 1 suppository into the rectum 2 (Two) Times a Day As Needed for Hemorrhoids (rectal discomfort). 6/4/20 3/15/21  Gladis Whitten APRN   ibuprofen (ADVIL,MOTRIN) 200 MG tablet Take 200 mg by mouth As Needed for Mild Pain .  3/15/21  Jose Dixon MD     Objective     Vital Signs: /70   Pulse 92   Temp 98.4 °F (36.9 °C) (Oral)   Resp 16   Ht 167.6 cm (66\")   Wt 103 kg (226 lb)   SpO2 100%   Breastfeeding No   BMI 36.48 kg/m²   Physical Exam  Constitutional:       Appearance: Normal appearance. She is not ill-appearing, toxic-appearing or diaphoretic.   HENT:      Head: Normocephalic and atraumatic.      Right Ear: External ear normal.      Left Ear: External ear normal.      Nose: Nose normal.      Mouth/Throat:      Mouth: Mucous membranes are dry.   Eyes:      General:         Right eye: No discharge.         Left eye: No discharge.      Extraocular Movements: Extraocular movements intact.      Pupils: Pupils are equal, round, and reactive to light.   Cardiovascular:      Rate and Rhythm: Normal rate and regular rhythm.      Pulses: " Normal pulses.      Heart sounds: No murmur.   Pulmonary:      Effort: Pulmonary effort is normal. No respiratory distress.      Breath sounds: Normal breath sounds. No stridor. No wheezing or rhonchi.   Abdominal:      General: Abdomen is flat. Bowel sounds are normal. There is no distension.      Palpations: Abdomen is soft. There is no mass.      Tenderness: There is no abdominal tenderness.      Hernia: No hernia is present.   Genitourinary:     Comments: External hemorrhoids noted by ER attending. She did not want to repeat rectal exam.  Musculoskeletal:         General: No swelling or tenderness. Normal range of motion.      Cervical back: Normal range of motion and neck supple. No tenderness.      Right lower leg: No edema.      Left lower leg: No edema.   Skin:     General: Skin is dry.      Capillary Refill: Capillary refill takes more than 3 seconds.      Coloration: Skin is pale.      Findings: No erythema or rash.   Neurological:      General: No focal deficit present.      Mental Status: She is alert and oriented to person, place, and time. Mental status is at baseline.      Cranial Nerves: No cranial nerve deficit.      Sensory: No sensory deficit.      Motor: No weakness.      Coordination: Coordination normal.   Psychiatric:         Mood and Affect: Mood normal.         Behavior: Behavior normal.     Results Reviewed:  Lab Results (last 24 hours)     Procedure Component Value Units Date/Time    Comprehensive Metabolic Panel [[id number]]  (Abnormal) Collected: 03/15/21 1855    Specimen: Blood from Arm, Right Updated: 03/15/21 1944     Glucose 105 mg/dL      BUN 9 mg/dL      Creatinine 0.55 mg/dL      Sodium 140 mmol/L      Potassium 4.1 mmol/L      Chloride 106 mmol/L      CO2 24.0 mmol/L      Calcium 9.2 mg/dL      Total Protein 6.9 g/dL      Albumin 4.30 g/dL      ALT (SGPT) 10 U/L      AST (SGOT) 18 U/L      Alkaline Phosphatase 46 U/L      Total Bilirubin 0.9 mg/dL      eGFR Non African Amer 124  mL/min/1.73      Globulin 2.6 gm/dL      A/G Ratio 1.7 g/dL      BUN/Creatinine Ratio 16.4     Anion Gap 10.0 mmol/L     Narrative:      GFR Normal >60  Chronic Kidney Disease <60  Kidney Failure <15      Manual Differential [[id number]]  (Abnormal) Collected: 03/15/21 1855    Specimen: Blood from Arm, Right Updated: 03/15/21 1939     Neutrophil % 76.0 %      Lymphocyte % 18.0 %      Monocyte % 3.0 %      Eosinophil % 1.0 %      Basophil % 2.0 %      Neutrophils Absolute 4.92 10*3/mm3      Lymphocytes Absolute 1.16 10*3/mm3      Monocytes Absolute 0.19 10*3/mm3      Eosinophils Absolute 0.06 10*3/mm3      Basophils Absolute 0.13 10*3/mm3      Acanthocytes Slight/1+     Anisocytosis Large/3+     Dacrocytes Large/3+     Elliptocytes Mod/2+     Hypochromia Large/3+     Poikilocytes Mod/2+     Polychromasia Large/3+     Stomatocytes Slight/1+     WBC Morphology Normal     Platelet Estimate Increased     Giant Platelets Slight/1+    Urinalysis With Culture If Indicated - Urine, Clean Catch [[id number]]  (Normal) Collected: 03/15/21 1903    Specimen: Urine, Clean Catch Updated: 03/15/21 1928     Color, UA Yellow     Appearance, UA Clear     pH, UA 6.5     Specific Gravity, UA 1.007     Glucose, UA Negative     Ketones, UA Negative     Bilirubin, UA Negative     Blood, UA Negative     Protein, UA Negative     Leuk Esterase, UA Negative     Nitrite, UA Negative     Urobilinogen, UA 0.2 E.U./dL    Narrative:      Urine microscopic not indicated.    Pregnancy, Urine - Urine, Clean Catch [[id number]]  (Normal) Collected: 03/15/21 1903    Specimen: Urine, Clean Catch Updated: 03/15/21 1927     HCG, Urine QL Negative    aPTT [[id number]]  (Normal) Collected: 03/15/21 1855    Specimen: Blood from Arm, Right Updated: 03/15/21 1919     PTT 29.6 seconds     Protime-INR [[id number]]  (Abnormal) Collected: 03/15/21 1855    Specimen: Blood from Arm, Right Updated: 03/15/21 1919     Protime 15.1 Seconds      INR 1.23    CBC & Differential  [[id number]]  (Abnormal) Collected: 03/15/21 1855    Specimen: Blood from Arm, Right Updated: 03/15/21 1919    Narrative:      The following orders were created for panel order CBC & Differential.  Procedure                               Abnormality         Status                     ---------                               -----------         ------                     CBC Auto Differential[[id number]]        Abnormal            Final result                 Please view results for these tests on the individual orders.    CBC Auto Differential [[id number]]  (Abnormal) Collected: 03/15/21 1855    Specimen: Blood from Arm, Right Updated: 03/15/21 1919     WBC 6.47 10*3/mm3      RBC 2.73 10*6/mm3      Hemoglobin 4.4 g/dL      Hematocrit 16.5 %      MCV 60.4 fL      MCH 16.1 pg      MCHC 26.7 g/dL      RDW 18.0 %      RDW-SD 38.5 fl      MPV 10.5 fL      Platelets 463 10*3/mm3     POC Occult Blood Stool [[id number]]  (Abnormal) Collected: 03/15/21 1901    Specimen: Stool Updated: 03/15/21 1902     Fecal Occult Blood Positive     Lot Number \187\     Expiration Date \8/31/21\     DEVELOPER LOT NUMBER \187\     DEVELOPER EXPIRATION DATE \8/31/21\     Positive Control Positive     Negative Control Negative        Imaging Results (Last 24 Hours)     ** No results found for the last 24 hours. **        I have personally reviewed and interpreted the radiology studies and ECG obtained at time of admission.     Assessment / Plan     Assessment:   Active Hospital Problems    Diagnosis    • Gastrointestinal hemorrhage    • Symptomatic anemia    • Gastroesophageal reflux disease with esophagitis      Seen on endoscopy June 2020.  Patient started on PPI.     • Rectal bleeding      Added automatically from request for surgery [id number]     • Other hemorrhoids      Added automatically from request for surgery [id number]       Plan:   Admit to medical floor  Vitals every 4 hours  Diet as tolerated  IVF  cc/hour  Check Iron, ferritin, B12,  folate, retyculocytes  Transfuse PRBC for hemoglobin under 7  Consider Venofer  Protonix 40 mg PO BID  GI consult in AM for possible EGD/Colonoscopy    DVT prophylaxis > SCD       Code Status: Full     I discussed the patient's findings and my recommendations with the patient and     Estimated length of stay over 2 midnights    Patient seen and examined by me on 3/15/2021 at 1959.    Electronically signed by Truman Jordan MD, 03/15/21, 20:23 CDT.

## 2021-03-16 NOTE — ED PROVIDER NOTES
Subjective   This patient is a 35-year-old female who is referred to the ER from urgent care because they discovered a profoundly low hemoglobin of 4.6.  The patient went because of fatigue and shortness of breath is been developing over weeks to months.  She also has chronic abdominal pain.  At urgent care they did screening labs and then sent her out to follow-up with her primary care physician.    She does endorse occasional dark stool and she occasionally has bleeding from her hemorrhoids.  Terms of menstrual cycle she does not have markedly heavy no prolonged menstrual cycles.  The last time she menstruated was early February.          Review of Systems   Respiratory: Positive for shortness of breath.    Neurological: Positive for weakness.   All other systems reviewed and are negative.      Past Medical History:   Diagnosis Date   • Anxiety    • GERD (gastroesophageal reflux disease)    • Hemorrhoids        No Known Allergies    Past Surgical History:   Procedure Laterality Date   • COLONOSCOPY N/A 5/22/2019    Non-bleeding internal hemorrhoids; The examination was otherwise normal; No specimens collected; Repeat colonoscopy at age 48 for screening purposes   • ENDOSCOPY N/A 6/17/2020    Procedure: ESOPHAGOGASTRODUODENOSCOPY WITH ANESTHESIA;  Surgeon: Octavia Mattson MD;  Location: [facility] ENDOSCOPY;  Service: Gastroenterology;  Laterality: N/A;  pre epigastric pain  post esophagitis  dr gladys irizarry   • WISDOM TOOTH EXTRACTION         Family History   Problem Relation Age of Onset   • Colon cancer Neg Hx    • Colon polyps Neg Hx    • Esophageal cancer Neg Hx    • Liver cancer Neg Hx    • Stomach cancer Neg Hx    • Rectal cancer Neg Hx    • Liver disease Neg Hx        Social History     Socioeconomic History   • Marital status:      Spouse name: Not on file   • Number of children: Not on file   • Years of education: Not on file   • Highest education level: Not on file   Tobacco Use   • Smoking status:  Former Smoker     Types: Cigarettes   • Smokeless tobacco: Never Used   Vaping Use   • Vaping Use: Never used   Substance and Sexual Activity   • Alcohol use: Yes     Comment: Occasional   • Drug use: No   • Sexual activity: Defer           Objective   Physical Exam  Vitals and nursing note reviewed.   Constitutional:       Appearance: She is well-developed.      Comments: The patient does appear pale   HENT:      Head: Normocephalic and atraumatic.      Right Ear: External ear normal.      Left Ear: External ear normal.      Nose: Nose normal.      Mouth/Throat:      Mouth: Mucous membranes are moist.      Pharynx: Oropharynx is clear.   Eyes:      Conjunctiva/sclera: Conjunctivae normal.   Cardiovascular:      Rate and Rhythm: Normal rate and regular rhythm.      Heart sounds: Normal heart sounds.   Pulmonary:      Effort: Pulmonary effort is normal.      Breath sounds: Normal breath sounds.   Abdominal:      General: Bowel sounds are normal.      Palpations: Abdomen is soft.   Genitourinary:     Rectum: Guaiac result positive.      Comments: External hemorrhoids are noted  Musculoskeletal:         General: Normal range of motion.      Cervical back: Normal range of motion and neck supple.   Skin:     General: Skin is warm and dry.      Capillary Refill: Capillary refill takes less than 2 seconds.   Neurological:      Mental Status: She is alert and oriented to person, place, and time.   Psychiatric:         Behavior: Behavior normal.         Thought Content: Thought content normal.         Judgment: Judgment normal.         Procedures           ED Course                                           MDM  Number of Diagnoses or Management Options     Amount and/or Complexity of Data Reviewed  Clinical lab tests: ordered and reviewed    Patient Progress  Patient progress: stable      Final diagnoses:   Gastrointestinal hemorrhage, unspecified gastrointestinal hemorrhage type   Symptomatic anemia     The patient's  work-up confirmed a significant anemia which is symptomatic and that she is fatigued and short of breath with minimal exertion.  She needs admission to the hospital for transfusion.  She is also Hemoccult positive so the likely source is the GI tract and this will have to be pursued to.  I discussed the case with Dr. Jordan who kindly agreed to admit the patient under his care.  The patient is currently stable in the ED       Tahir Garcia MD  03/15/21 1951

## 2021-03-16 NOTE — PLAN OF CARE
Goal Outcome Evaluation:  Plan of Care Reviewed With: patient  Progress: no change  Outcome Summary: Patient admitted from ER with a hgb of 4.4 and hct of 16.5; 1 unit PRBCs given per orders. Recheck of hgb was 5.5 and hct 19.4. I called Dr. Jordan and he ordered another unit PRBCs; currently infusing. IVF. I made patient NPO after midnight because she has a GI consult this morning. Up ad trace. SCDs. VSS. Patient is pretty asymptomatic however does feel fatigued. BM this morning in which patient said there was a clot but I was unable to see it before she flushed. VSS. Safety maintained. No distress noted at this time.

## 2021-03-16 NOTE — PLAN OF CARE
Goal Outcome Evaluation:        Outcome Summary: Pt up ad trace. Pt Voiding. Pt current recieving unit 4 of prbcs. HGB 6.2 prior to third unit. 3 small bowel movements with redness noted this shift. Safety is being maintained. VSS.

## 2021-03-17 PROBLEM — R93.89 ABNORMAL CT SCAN: Status: ACTIVE | Noted: 2021-03-17

## 2021-03-17 LAB
ANION GAP SERPL CALCULATED.3IONS-SCNC: 11 MMOL/L (ref 5–15)
ANISOCYTOSIS BLD QL: ABNORMAL
BASOPHILS # BLD MANUAL: 0.05 10*3/MM3 (ref 0–0.2)
BASOPHILS NFR BLD AUTO: 1 % (ref 0–1.5)
BH BB BLOOD EXPIRATION DATE: NORMAL
BH BB BLOOD TYPE BARCODE: 5100
BH BB DISPENSE STATUS: NORMAL
BH BB PRODUCT CODE: NORMAL
BH BB UNIT NUMBER: NORMAL
BUN SERPL-MCNC: 9 MG/DL (ref 6–20)
BUN/CREAT SERPL: 15.3 (ref 7–25)
CALCIUM SPEC-SCNC: 8.9 MG/DL (ref 8.6–10.5)
CHLORIDE SERPL-SCNC: 104 MMOL/L (ref 98–107)
CO2 SERPL-SCNC: 25 MMOL/L (ref 22–29)
CREAT SERPL-MCNC: 0.59 MG/DL (ref 0.57–1)
CROSSMATCH INTERPRETATION: NORMAL
DEPRECATED RDW RBC AUTO: 58.8 FL (ref 37–54)
EOSINOPHIL # BLD MANUAL: 0.14 10*3/MM3 (ref 0–0.4)
EOSINOPHIL NFR BLD MANUAL: 3 % (ref 0.3–6.2)
ERYTHROCYTE [DISTWIDTH] IN BLOOD BY AUTOMATED COUNT: 24.9 % (ref 12.3–15.4)
GFR SERPL CREATININE-BSD FRML MDRD: 115 ML/MIN/1.73
GIANT PLATELETS: ABNORMAL
GLUCOSE SERPL-MCNC: 89 MG/DL (ref 65–99)
HCT VFR BLD AUTO: 28.2 % (ref 34–46.6)
HGB BLD-MCNC: 8.5 G/DL (ref 12–15.9)
LYMPHOCYTES # BLD MANUAL: 1.35 10*3/MM3 (ref 0.7–3.1)
LYMPHOCYTES NFR BLD MANUAL: 28.3 % (ref 19.6–45.3)
LYMPHOCYTES NFR BLD MANUAL: 6.1 % (ref 5–12)
MCH RBC QN AUTO: 20.6 PG (ref 26.6–33)
MCHC RBC AUTO-ENTMCNC: 30.1 G/DL (ref 31.5–35.7)
MCV RBC AUTO: 68.4 FL (ref 79–97)
MICROCYTES BLD QL: ABNORMAL
MONOCYTES # BLD AUTO: 0.29 10*3/MM3 (ref 0.1–0.9)
NEUTROPHILS # BLD AUTO: 2.89 10*3/MM3 (ref 1.7–7)
NEUTROPHILS NFR BLD MANUAL: 60.6 % (ref 42.7–76)
NRBC SPEC MANUAL: 2 /100 WBC (ref 0–0.2)
PLATELET # BLD AUTO: 385 10*3/MM3 (ref 140–450)
PMV BLD AUTO: 10.4 FL (ref 6–12)
POIKILOCYTOSIS BLD QL SMEAR: ABNORMAL
POLYCHROMASIA BLD QL SMEAR: ABNORMAL
POTASSIUM SERPL-SCNC: 3.7 MMOL/L (ref 3.5–5.2)
RBC # BLD AUTO: 4.12 10*6/MM3 (ref 3.77–5.28)
SARS-COV-2 RNA PNL SPEC NAA+PROBE: NOT DETECTED
SODIUM SERPL-SCNC: 140 MMOL/L (ref 136–145)
UNIT  ABO: NORMAL
UNIT  RH: NORMAL
VARIANT LYMPHS NFR BLD MANUAL: 1 % (ref 0–5)
WBC # BLD AUTO: 4.77 10*3/MM3 (ref 3.4–10.8)
WBC MORPH BLD: NORMAL

## 2021-03-17 PROCEDURE — 85007 BL SMEAR W/DIFF WBC COUNT: CPT | Performed by: FAMILY MEDICINE

## 2021-03-17 PROCEDURE — 85025 COMPLETE CBC W/AUTO DIFF WBC: CPT | Performed by: FAMILY MEDICINE

## 2021-03-17 PROCEDURE — 25010000002 IRON SUCROSE PER 1 MG: Performed by: NURSE PRACTITIONER

## 2021-03-17 PROCEDURE — 80048 BASIC METABOLIC PNL TOTAL CA: CPT | Performed by: FAMILY MEDICINE

## 2021-03-17 PROCEDURE — 87635 SARS-COV-2 COVID-19 AMP PRB: CPT | Performed by: INTERNAL MEDICINE

## 2021-03-17 RX ORDER — PEG-3350, SODIUM SULFATE, SODIUM CHLORIDE, POTASSIUM CHLORIDE, SODIUM ASCORBATE AND ASCORBIC ACID 7.5-2.691G
1000 KIT ORAL
Status: COMPLETED | OUTPATIENT
Start: 2021-03-18 | End: 2021-03-18

## 2021-03-17 RX ORDER — PEG-3350, SODIUM SULFATE, SODIUM CHLORIDE, POTASSIUM CHLORIDE, SODIUM ASCORBATE AND ASCORBIC ACID 7.5-2.691G
1000 KIT ORAL
Status: COMPLETED | OUTPATIENT
Start: 2021-03-17 | End: 2021-03-17

## 2021-03-17 RX ADMIN — POLYETHYLENE GLYCOL 3350, SODIUM SULFATE, SODIUM CHLORIDE, POTASSIUM CHLORIDE, ASCORBIC ACID, SODIUM ASCORBATE 1000 ML: KIT at 17:37

## 2021-03-17 RX ADMIN — SODIUM CHLORIDE, PRESERVATIVE FREE 10 ML: 5 INJECTION INTRAVENOUS at 08:46

## 2021-03-17 RX ADMIN — SODIUM CHLORIDE 50 ML/HR: 9 INJECTION, SOLUTION INTRAVENOUS at 04:43

## 2021-03-17 RX ADMIN — PANTOPRAZOLE SODIUM 40 MG: 40 TABLET, DELAYED RELEASE ORAL at 07:50

## 2021-03-17 RX ADMIN — IRON SUCROSE 200 MG: 20 INJECTION, SOLUTION INTRAVENOUS at 07:50

## 2021-03-17 RX ADMIN — PANTOPRAZOLE SODIUM 40 MG: 40 TABLET, DELAYED RELEASE ORAL at 16:49

## 2021-03-17 NOTE — PLAN OF CARE
Goal Outcome Evaluation:  Plan of Care Reviewed With: patient  Progress: no change  Outcome Summary: Patient denies pain this shift. Up ad trace. Placed on a regular diet until colon prep for colonoscopy on thursday. IVF. VSS. Safety maintianed. 4th unit of blood completed at beginning of shift. Patient went down for CT with contrast at beginning of shift. No distress noted at this time.

## 2021-03-17 NOTE — NURSING NOTE
Attempted iv to right forearm with 22g cath. Flash back observed flush and vein ruptured. unsuccessful iv

## 2021-03-17 NOTE — H&P (VIEW-ONLY)
Inpatient Gastroenterology Service    Consultation    Tavares Hernandez MD, FACP        Patient referred for evaluation of profound anemia  History is obtained from the patient, the EMR, and appears reliable        Chief complaint  -fatigue        History of Present Illness  -patient reports gradual onset of fatiguability over a long interval.  She has had small volume hematochezia, which she attributed to hemorrhoids.  She has had straining at stool and associated abdominal discomfort, nausea, and tenesmus.    -denies hematemesis, melena, emesis, diarrhea, constipation, pyrosis, regurgitation, dysphagia, odynophagia, altered bowel habit, change in stool, choluria, acholic stool, or jaundice        Past Medical History  -HTN  -GERD  -IBS        Past Surgical History  -wisdom tooth        Past Endoscopy History  -most recent EGD: 2020: esophagitis  -most recent colonoscopy: 2019: hemorrhoids        Past Procedural History  -heart cath: 0  -coronary artery stents: 0        Family History  -no report of family history of: gastric cancer, pancreatic cancer, colorectal cancer, breast cancer, ovarian cancer, uterine cancer, colorectal polyps, inflammatory bowel disease, Crohn's disease, ulcerative colitis, peptic ulcer disease, pancreatitis, hepatitis, cirrhosis        Social History  -  -tobacco use: quit  -ethanol use: moderate        Medications  -see EMR lists        Physical Exam  General  -appears stated age  -no acute distress  -no outstanding physical abnormality  -multiple tattoos    HEENT  -normocephalic  -atraumatic  -no drainage  -no bleeding  -mucosa appears moist     Neurological  -alert  -oriented  -normal speech  -no slowed response  -no tremor  -no obvious focal deficit  -moves all extremities without obvious abnormality    Psychiatric  -normal mood  -appropriate responses to questions  -no unusual behaviors noted    Skin (evaluation limited to exposed skin)  -dry  -no diaphoresis  -no  icterus  -no rash     Neck  -supple  -normal passive ROM  -no JVD    Pulmonary  -normal respiratory effort  -no respiratory distress  -no stridor    Cardiovascular  -normal rate  -regular rhythm    Abdomen  -benign  -soft    Receiving blood, drinking contrast for CT        Laboratory of Note  -Hb 4.6, MCV 63  -normal BUN and Cr   (+) FOB        Imaging of Note  -CT pending          Assessment  -profound anemia, clearly chronic  -modest hematochezia, relatively recent colonoscopy  -nausea, epigastric component of abdominal pain  -multiple tattoos: at risk for HCV      Recommendations  -would check HCV  -will plan colon prep for tomorrow, colonoscopy on Thursday  -ok for diet this evening as desired by primary inpatient care team after her scan  -follow H&H, transfuse as clinically appropriate          Thank you for allowing us to participate in the care of this patient.    Sincerely,    SOPHIA Hernandez MD, FACP

## 2021-03-17 NOTE — PROGRESS NOTES
[facility] Medicine Services  INPATIENT PROGRESS NOTE    Length of Stay: 2  Date of Admission: 3/15/2021  Primary Care Physician: Mono Tiwari MD    Subjective   Chief Complaint: Weak, lower abdominal pressure  HPI   Worsening weakness over the past month.  Hemoglobin 4.4 previously 14.1 on 4/8/2021    Lying in bed.  She received 4 units of packed red blood cells yesterday for hemoglobin 4.4 and has improved to 8.5 today.  Iron deficiency anemia we will transfuse IV Venofer.  She still reports lower abdominal pressure and need to force to push to have a bowel movement.  She reports intermittent blood in stools as well is intermittent clotting.  CT scan of the abdomen yesterday noted focal wall thickening of the rectum.  Rectal neoplasm is in the differential diagnosis.  Focal area of inflammation less likely.  Focal colonic contraction also possible.  Results of abnormal finding discussed with patient.  GI saw yesterday and plans for colonoscopy tomorrow.    Review of Systems   Constitutional: Positive for activity change and fatigue. Negative for chills and fever.   HENT: Negative for congestion and trouble swallowing.    Eyes: Negative for photophobia and visual disturbance.   Respiratory: Positive for shortness of breath. Negative for cough and wheezing.    Cardiovascular: Negative for chest pain, palpitations and leg swelling.   Gastrointestinal: Positive for abdominal pain (Lower abdominal pressure), blood in stool and rectal pain. Negative for nausea and vomiting.   Endocrine: Negative for cold intolerance, heat intolerance and polyuria.   Genitourinary: Negative for dysuria, frequency and urgency.   Musculoskeletal: Negative for back pain and gait problem.   Skin: Negative for color change, pallor, rash and wound.   Allergic/Immunologic: Negative for immunocompromised state.   Neurological: Positive for weakness.   Hematological: Negative for adenopathy. Does not  bruise/bleed easily.   Psychiatric/Behavioral: Negative for agitation, behavioral problems and confusion.     All pertinent negatives and positives are as above. All other systems have been reviewed and are negative unless otherwise stated.     Objective    Temp:  [98 °F (36.7 °C)-98.6 °F (37 °C)] 98.1 °F (36.7 °C)  Heart Rate:  [62-83] 64  Resp:  [16-18] 16  BP: (100-151)/(55-87) 111/66  Physical Exam  Vitals and nursing note reviewed.   Constitutional:       Appearance: Normal appearance.      Comments: Lying in bed.  No oxygen in use.   HENT:      Head: Normocephalic and atraumatic.      Nose: No congestion.      Mouth/Throat:      Pharynx: No oropharyngeal exudate.   Eyes:      Extraocular Movements: Extraocular movements intact.      Pupils: Pupils are equal, round, and reactive to light.   Cardiovascular:      Rate and Rhythm: Normal rate and regular rhythm.      Heart sounds: No murmur.   Pulmonary:      Breath sounds: No wheezing, rhonchi or rales.      Comments: No oxygen in use.  Abdominal:      Palpations: Abdomen is soft.      Tenderness: There is tenderness (lower abdomen)   Genitourinary:     Comments: Voiding.  Musculoskeletal:         General: No swelling or tenderness.      Cervical back: Normal range of motion and neck supple.   Skin:     General: Skin is warm and dry.      Comments: Multiple tattoos over her body  Neurological:      General: No focal deficit present.      Mental Status: She is alert and oriented to person, place, and time.   Psychiatric:         Mood and Affect: Mood normal.         Behavior: Behavior normal.         Thought Content: Thought content normal.         Judgment: Judgment normal.     Results Review:  I have reviewed the labs, radiology results, and diagnostic studies.    Laboratory Data:      Results from last 7 days   Lab Units 03/17/21  0346 03/16/21  0944 03/16/21  0246 03/15/21  1855 03/15/21  1613   WBC 10*3/mm3 4.77  --  4.91 6.47 6.65   HEMOGLOBIN g/dL 8.5* 6.2*  5.5* 4.4* 4.6*   HEMATOCRIT % 28.2* 21.6* 19.4* 16.5* 17.4*   PLATELETS 10*3/mm3 385  --  426 463* 488*        Results from last 7 days   Lab Units 03/17/21  0346 03/16/21  0246 03/15/21  1855 03/15/21  1613   SODIUM mmol/L 140 141 140 138   POTASSIUM mmol/L 3.7 4.0 4.1 4.2   CHLORIDE mmol/L 104 107 106 105   CO2 mmol/L 25.0 24.0 24.0 23.0   BUN mg/dL 9 9 9 10   CREATININE mg/dL 0.59 0.61 0.55* 0.55*   GLUCOSE mg/dL 89 94 105* 94   CALCIUM mg/dL 8.9 9.1 9.2 9.5   ALT (SGPT) U/L  --   --  10 9     Culture Data:    No results found for: BLOODCX, URINECX, WOUNDCX, MRSACX, RESPCX, STOOLCX    Radiology Data:   Imaging Results (Last 7 Days)     Procedure Component Value Units Date/Time    CT Abdomen Pelvis With & Without Contrast [[id number]] Collected: 03/16/21 2109     Updated: 03/16/21 2118    Narrative:      EXAMINATION:  CT ABDOMEN PELVIS W WO CONTRAST-  3/16/2021 8:45 PM CDT     HISTORY: Abdominal pain, fever, post-op; K92.2-Gastrointestinal  hemorrhage, unspecified; D64.9-Anemia, unspecified.     TECHNIQUE: Spiral CT was performed of the abdomen and pelvis without and  with contrast.. Multiplanar images were reconstructed.     DLP: 1597 mGy-cm. Automated dosage control was utilized.     COMPARISON: 5/12/2020.      LUNG BASES: The lung bases are clear.     LIVER AND SPLEEN: The visualized liver and spleen are unremarkable.  There are no dense gallstones.     PANCREAS: No pancreatic mass or inflammatory change.     KIDNEYS, ADRENALS AND URINARY BLADDER: The adrenal glands and right  kidney are unremarkable. There is a 3-4 mm probable cyst in the midpole  region left kidney, too small to fully characterize. The urinary bladder  is unremarkable. The ureters are nondilated.     BOWEL: Gastric wall thickening is probably an artifact of  underdistention. Small bowel loops are nondilated. The appendix is  normal in caliber. There is a focal area of wall thickening of the  rectum seen best on axial images 76 through 79.      OTHER: The uterus and ovaries are unremarkable. Trace amount of free  fluid in the pelvis is probably physiologic. There are no pathologically  enlarged lymph nodes.       Impression:      1. Focal wall thickening of the rectum. GI consultation recommended.  Rectal neoplasm is in the differential diagnosis. Focal area of  inflammation less likely. Focal colonic contraction also possible.  2. Tiny left renal lesion is probably a small cyst. It is too small to  fully characterize.  3. Trace amount of free fluid in the pelvis is likely physiologic.  This report was finalized on 03/16/2021 21:15 by Dr. Boris Blankenship MD.        Intake/Output    Intake/Output Summary (Last 24 hours) at 3/17/2021 0954  Last data filed at 3/17/2021 0443  Gross per 24 hour   Intake 2616 ml   Output --   Net 2616 ml     Scheduled Meds  iron sucrose (VENOFER) IVPB, 200 mg, Intravenous, Q24H  pantoprazole, 40 mg, Oral, BID AC  sodium chloride, 10 mL, Intravenous, Q12H      I have reviewed the patient current medications.     Assessment/Plan     Active Hospital Problems    Diagnosis    • **Symptomatic anemia    • Abnormal CT scan abdomen    • Iron deficiency anemia due to chronic blood loss    • Gastrointestinal hemorrhage    • Gastroesophageal reflux disease with esophagitis      Seen on endoscopy June 2020.  Patient started on PPI.     • Rectal bleeding      Added automatically from request for surgery [id number]     • Other hemorrhoids      Added automatically from request for surgery [id number]       Plan:  1.  To urgent care 3/15 with weakness and shortness of breath worsening over the past month.  Hemoglobin 4.4 with normal hemoglobin 14.1 on 4/8/2020.  Reports lower abdominal pressure and sensation to bear down forcefully for bowel movement.  2.  Hemoglobin 4.4.  Transfused 4 units packed red blood cells.  Hemoglobin 8.5 today.  3.  Iron panel 10, saturation 2%, ferritin 2.49, transferritin 3.93, TIBC 586.  Will order IV Venofer 200 mg daily  for 3 days.  4.  Protonix twice daily  5.  CT scan of the abdomen 3/16 notes focal wall thickening of the rectum.  Rectal neoplasm is in the differential diagnosis.  Focal area of inflammation less likely.  Focal colonic contraction also possible.  Results of CT scan discussed with patient.  6.  GI consulted and plans for colonoscopy tomorrow.  7.  Reviewed endoscopy 6/17/2020 notes esophagitis.  Colonoscopy 5/10/22/19 nonbleeding internal hemorrhoid.  8.  Clear liquid diet today.  Discontinue IV fluids.  9.  Patient does not take any home medications.  10.  Patient has arranged appointment with Dr. Danelle Chapa on 4/18/2021 as new patient.  She has previously been seeing Dr. Tiwari.  11.  CBC in a.m.    Her  will assist with decision-making as she is unable to make decisions for self  The above documentation resulted from a face-to-face encounter by me Jaja SPANN, [facility].    Discharge Planning: I expect patient to be discharged to home to be determined.     Electronically signed by MARIA INES Li, 3/17/2021, 09:54 CDT.

## 2021-03-17 NOTE — PLAN OF CARE
Goal Outcome Evaluation:        Outcome Summary: Pt up adlib and ambulating in the donohue. Patient now clear liquid diet to prep for colonoscopy. Will start prep at 1800. IVF. VSS. Pt Hgb was 8.5 with morning labs. Safety being maintained.

## 2021-03-18 ENCOUNTER — ANESTHESIA (OUTPATIENT)
Dept: GASTROENTEROLOGY | Facility: HOSPITAL | Age: 36
End: 2021-03-18

## 2021-03-18 ENCOUNTER — ANESTHESIA EVENT (OUTPATIENT)
Dept: GASTROENTEROLOGY | Facility: HOSPITAL | Age: 36
End: 2021-03-18

## 2021-03-18 VITALS
BODY MASS INDEX: 36.32 KG/M2 | TEMPERATURE: 97.3 F | HEIGHT: 66 IN | HEART RATE: 62 BPM | WEIGHT: 226 LBS | DIASTOLIC BLOOD PRESSURE: 72 MMHG | SYSTOLIC BLOOD PRESSURE: 121 MMHG | OXYGEN SATURATION: 99 % | RESPIRATION RATE: 16 BRPM

## 2021-03-18 LAB
ANION GAP SERPL CALCULATED.3IONS-SCNC: 11 MMOL/L (ref 5–15)
BASOPHILS # BLD AUTO: 0.06 10*3/MM3 (ref 0–0.2)
BASOPHILS NFR BLD AUTO: 1 % (ref 0–1.5)
BUN SERPL-MCNC: 8 MG/DL (ref 6–20)
BUN/CREAT SERPL: 14 (ref 7–25)
CALCIUM SPEC-SCNC: 8.7 MG/DL (ref 8.6–10.5)
CHLORIDE SERPL-SCNC: 111 MMOL/L (ref 98–107)
CO2 SERPL-SCNC: 23 MMOL/L (ref 22–29)
CREAT SERPL-MCNC: 0.57 MG/DL (ref 0.57–1)
DEPRECATED RDW RBC AUTO: 59.7 FL (ref 37–54)
EOSINOPHIL # BLD AUTO: 0.16 10*3/MM3 (ref 0–0.4)
EOSINOPHIL NFR BLD AUTO: 2.7 % (ref 0.3–6.2)
ERYTHROCYTE [DISTWIDTH] IN BLOOD BY AUTOMATED COUNT: 25.4 % (ref 12.3–15.4)
GFR SERPL CREATININE-BSD FRML MDRD: 119 ML/MIN/1.73
GLUCOSE SERPL-MCNC: 96 MG/DL (ref 65–99)
HCT VFR BLD AUTO: 27.9 % (ref 34–46.6)
HGB BLD-MCNC: 8.3 G/DL (ref 12–15.9)
LYMPHOCYTES # BLD AUTO: 0.9 10*3/MM3 (ref 0.7–3.1)
LYMPHOCYTES NFR BLD AUTO: 15 % (ref 19.6–45.3)
MCH RBC QN AUTO: 20.4 PG (ref 26.6–33)
MCHC RBC AUTO-ENTMCNC: 29.7 G/DL (ref 31.5–35.7)
MCV RBC AUTO: 68.7 FL (ref 79–97)
MONOCYTES # BLD AUTO: 0.75 10*3/MM3 (ref 0.1–0.9)
MONOCYTES NFR BLD AUTO: 12.5 % (ref 5–12)
NEUTROPHILS NFR BLD AUTO: 4.12 10*3/MM3 (ref 1.7–7)
NEUTROPHILS NFR BLD AUTO: 68.5 % (ref 42.7–76)
PLATELET # BLD AUTO: 352 10*3/MM3 (ref 140–450)
PMV BLD AUTO: 10.4 FL (ref 6–12)
POTASSIUM SERPL-SCNC: 3.8 MMOL/L (ref 3.5–5.2)
RBC # BLD AUTO: 4.06 10*6/MM3 (ref 3.77–5.28)
SODIUM SERPL-SCNC: 145 MMOL/L (ref 136–145)
WBC # BLD AUTO: 6.01 10*3/MM3 (ref 3.4–10.8)

## 2021-03-18 PROCEDURE — 88305 TISSUE EXAM BY PATHOLOGIST: CPT | Performed by: INTERNAL MEDICINE

## 2021-03-18 PROCEDURE — 0DBP8ZX EXCISION OF RECTUM, VIA NATURAL OR ARTIFICIAL OPENING ENDOSCOPIC, DIAGNOSTIC: ICD-10-PCS | Performed by: INTERNAL MEDICINE

## 2021-03-18 PROCEDURE — 25010000002 PROPOFOL 10 MG/ML EMULSION: Performed by: NURSE ANESTHETIST, CERTIFIED REGISTERED

## 2021-03-18 PROCEDURE — 0DB68ZX EXCISION OF STOMACH, VIA NATURAL OR ARTIFICIAL OPENING ENDOSCOPIC, DIAGNOSTIC: ICD-10-PCS | Performed by: INTERNAL MEDICINE

## 2021-03-18 PROCEDURE — 25010000002 IRON SUCROSE PER 1 MG: Performed by: NURSE PRACTITIONER

## 2021-03-18 PROCEDURE — 80048 BASIC METABOLIC PNL TOTAL CA: CPT | Performed by: FAMILY MEDICINE

## 2021-03-18 PROCEDURE — 25010000002 ONDANSETRON PER 1 MG: Performed by: FAMILY MEDICINE

## 2021-03-18 PROCEDURE — 45380 COLONOSCOPY AND BIOPSY: CPT | Performed by: INTERNAL MEDICINE

## 2021-03-18 PROCEDURE — 43239 EGD BIOPSY SINGLE/MULTIPLE: CPT | Performed by: INTERNAL MEDICINE

## 2021-03-18 PROCEDURE — 85025 COMPLETE CBC W/AUTO DIFF WBC: CPT | Performed by: FAMILY MEDICINE

## 2021-03-18 PROCEDURE — 87081 CULTURE SCREEN ONLY: CPT | Performed by: INTERNAL MEDICINE

## 2021-03-18 RX ORDER — LIDOCAINE HYDROCHLORIDE 20 MG/ML
INJECTION, SOLUTION EPIDURAL; INFILTRATION; INTRACAUDAL; PERINEURAL AS NEEDED
Status: DISCONTINUED | OUTPATIENT
Start: 2021-03-18 | End: 2021-03-18 | Stop reason: SURG

## 2021-03-18 RX ORDER — PROPOFOL 10 MG/ML
VIAL (ML) INTRAVENOUS AS NEEDED
Status: DISCONTINUED | OUTPATIENT
Start: 2021-03-18 | End: 2021-03-18 | Stop reason: SURG

## 2021-03-18 RX ORDER — LIDOCAINE HYDROCHLORIDE 10 MG/ML
0.5 INJECTION, SOLUTION EPIDURAL; INFILTRATION; INTRACAUDAL; PERINEURAL ONCE AS NEEDED
Status: DISCONTINUED | OUTPATIENT
Start: 2021-03-18 | End: 2021-03-18 | Stop reason: HOSPADM

## 2021-03-18 RX ORDER — PANTOPRAZOLE SODIUM 40 MG/1
40 TABLET, DELAYED RELEASE ORAL 2 TIMES DAILY
Qty: 60 TABLET | Refills: 2 | Status: SHIPPED | OUTPATIENT
Start: 2021-03-18 | End: 2021-05-27

## 2021-03-18 RX ORDER — SODIUM CHLORIDE 0.9 % (FLUSH) 0.9 %
10 SYRINGE (ML) INJECTION AS NEEDED
Status: DISCONTINUED | OUTPATIENT
Start: 2021-03-18 | End: 2021-03-18 | Stop reason: HOSPADM

## 2021-03-18 RX ORDER — SODIUM CHLORIDE 9 MG/ML
500 INJECTION, SOLUTION INTRAVENOUS CONTINUOUS PRN
Status: DISCONTINUED | OUTPATIENT
Start: 2021-03-18 | End: 2021-03-18 | Stop reason: HOSPADM

## 2021-03-18 RX ORDER — FERROUS SULFATE 325(65) MG
325 TABLET ORAL
Start: 2021-03-18

## 2021-03-18 RX ADMIN — PROPOFOL 200 MG: 10 INJECTION, EMULSION INTRAVENOUS at 12:50

## 2021-03-18 RX ADMIN — SODIUM CHLORIDE 500 ML: 0.9 INJECTION, SOLUTION INTRAVENOUS at 11:22

## 2021-03-18 RX ADMIN — PANTOPRAZOLE SODIUM 40 MG: 40 TABLET, DELAYED RELEASE ORAL at 17:37

## 2021-03-18 RX ADMIN — IRON SUCROSE 200 MG: 20 INJECTION, SOLUTION INTRAVENOUS at 06:27

## 2021-03-18 RX ADMIN — SODIUM CHLORIDE, PRESERVATIVE FREE 10 ML: 5 INJECTION INTRAVENOUS at 09:15

## 2021-03-18 RX ADMIN — LIDOCAINE HYDROCHLORIDE 100 MG: 20 INJECTION, SOLUTION EPIDURAL; INFILTRATION; INTRACAUDAL; PERINEURAL at 12:37

## 2021-03-18 RX ADMIN — PROPOFOL 200 MG: 10 INJECTION, EMULSION INTRAVENOUS at 12:58

## 2021-03-18 RX ADMIN — PROPOFOL 200 MG: 10 INJECTION, EMULSION INTRAVENOUS at 12:41

## 2021-03-18 RX ADMIN — PROPOFOL 200 MG: 10 INJECTION, EMULSION INTRAVENOUS at 12:37

## 2021-03-18 RX ADMIN — PANTOPRAZOLE SODIUM 40 MG: 40 TABLET, DELAYED RELEASE ORAL at 09:14

## 2021-03-18 RX ADMIN — POLYETHYLENE GLYCOL 3350, SODIUM SULFATE, SODIUM CHLORIDE, POTASSIUM CHLORIDE, ASCORBIC ACID, SODIUM ASCORBATE 1000 ML: KIT at 03:52

## 2021-03-18 RX ADMIN — ONDANSETRON HYDROCHLORIDE 4 MG: 2 SOLUTION INTRAMUSCULAR; INTRAVENOUS at 04:10

## 2021-03-18 RX ADMIN — PROPOFOL 200 MG: 10 INJECTION, EMULSION INTRAVENOUS at 12:48

## 2021-03-18 NOTE — ANESTHESIA POSTPROCEDURE EVALUATION
"Patient: Ratna Flor    Procedure Summary     Date: 03/18/21 Room / Location: [facility] ENDOSCOPY 4 / [facility]    Anesthesia Start: 1230 Anesthesia Stop: 1316    Procedures:       ESOPHAGOGASTRODUODENOSCOPY WITH ANESTHESIA (N/A )      COLONOSCOPY WITH ANESTHESIA (N/A ) Diagnosis:       Gastrointestinal hemorrhage, unspecified gastrointestinal hemorrhage type      Iron deficiency anemia due to chronic blood loss      (Gastrointestinal hemorrhage, unspecified gastrointestinal hemorrhage type [K92.2])      (Iron deficiency anemia due to chronic blood loss [D50.0])    Surgeons: Tavares Hernandez MD Provider: Matthew Fabian CRNA    Anesthesia Type: MAC ASA Status: 2 - Emergent          Anesthesia Type: MAC    Vitals  Vitals Value Taken Time   BP     Temp     Pulse 75 03/18/21 1316   Resp     SpO2 92 % 03/18/21 1316   Vitals shown include unvalidated device data.        Post Anesthesia Care and Evaluation    Patient location during evaluation: PACU  Patient participation: complete - patient participated  Level of consciousness: awake and alert  Pain management: adequate  Airway patency: patent  Anesthetic complications: No anesthetic complications    Cardiovascular status: acceptable  Respiratory status: acceptable  Hydration status: acceptable    Comments: Blood pressure 122/61, pulse 70, temperature 97.7 °F (36.5 °C), temperature source Oral, resp. rate 16, height 167.6 cm (66\"), weight 103 kg (226 lb), SpO2 98 %, not currently breastfeeding.    Pt discharged from PACU based on aniyah score >8      "

## 2021-03-18 NOTE — PLAN OF CARE
Goal Outcome Evaluation:  Plan of Care Reviewed With: patient  Progress: no change  Outcome Summary: pt NPO since MN for colonoscopy in a.m.  IVF's dc'd;  second part of prep to start at 0400 a.m.;  safety maintained

## 2021-03-18 NOTE — ANESTHESIA PREPROCEDURE EVALUATION
Anesthesia Evaluation     Patient summary reviewed and Nursing notes reviewed   no history of anesthetic complications:  NPO Solid Status: > 8 hours  NPO Liquid Status: > 2 hours           Airway   Mallampati: II  TM distance: >3 FB  Neck ROM: full  No difficulty expected  Dental      Pulmonary    (+) shortness of breath,   Cardiovascular   Exercise tolerance: good (4-7 METS)    ECG reviewed    (-) hypertension, CAD      Neuro/Psych  (+) psychiatric history Anxiety,     (-) seizures, TIA, CVA  GI/Hepatic/Renal/Endo    (+) obesity,  GERD, GI bleeding ,   (-) no renal disease, diabetes    ROS Comment: Admitted with weakness, soa, Hgb 4.4    Musculoskeletal     Abdominal    Substance History      OB/GYN          Other                        Anesthesia Plan    ASA 2 - emergent     MAC     intravenous induction     Anesthetic plan, all risks, benefits, and alternatives have been provided, discussed and informed consent has been obtained with: patient.

## 2021-03-18 NOTE — DISCHARGE SUMMARY
[facility] Medicine Services  DISCHARGE SUMMARY       Date of Admission: 3/15/2021  Date of Discharge:  3/18/2021  Primary Care Physician: Mono Tiwari MD    Presenting Problem/History of Present Illness:  Weakness, shortness of breath    Final Discharge Diagnoses:  Active Hospital Problems    Diagnosis    • **Symptomatic anemia    • Abnormal CT scan abdomen    • Iron deficiency anemia due to chronic blood loss    • Gastrointestinal hemorrhage    • Gastroesophageal reflux disease with esophagitis      Seen on endoscopy June 2020.  Patient started on PPI.     • Rectal bleeding      Added automatically from request for surgery [id number]     • Other hemorrhoids      Added automatically from request for surgery [id number]       Consults: Gastroenterology    Procedures Performed:   3/18/2021-upper GI endoscopy  - Z-line irregular. Biopsied.  - Normal stomach. Biopsied.  - Normal first portion of the duodenum and second portion of the duodenum.    3/18/2021-colonoscopy  - The examined portion of the ileum was normal.  - Erythematous mucosa in the rectum. Biopsied.  - Non-bleeding internal hemorrhoids.    Pertinent Test Results:   Lab Results (all)     Procedure Component Value Units Date/Time    Urease For H Pylori - Tissue, Stomach [[id number]] Collected: 03/18/21 1244    Specimen: Tissue from Stomach Updated: 03/18/21 1428    Basic Metabolic Panel [[id number]]  (Abnormal) Collected: 03/18/21 0336    Specimen: Blood Updated: 03/18/21 0453     Glucose 96 mg/dL      BUN 8 mg/dL      Creatinine 0.57 mg/dL      Sodium 145 mmol/L      Potassium 3.8 mmol/L      Chloride 111 mmol/L      CO2 23.0 mmol/L      Calcium 8.7 mg/dL      eGFR Non African Amer 119 mL/min/1.73      BUN/Creatinine Ratio 14.0     Anion Gap 11.0 mmol/L     CBC Auto Differential [[id number]]  (Abnormal) Collected: 03/18/21 0336    Specimen: Blood Updated: 03/18/21 0441     WBC 6.01 10*3/mm3      RBC 4.06 10*6/mm3       Hemoglobin 8.3 g/dL      Hematocrit 27.9 %      MCV 68.7 fL      MCH 20.4 pg      MCHC 29.7 g/dL      RDW 25.4 %      RDW-SD 59.7 fl      MPV 10.4 fL      Platelets 352 10*3/mm3      Neutrophil % 68.5 %      Lymphocyte % 15.0 %      Monocyte % 12.5 %      Eosinophil % 2.7 %      Basophil % 1.0 %      Neutrophils, Absolute 4.12 10*3/mm3      Lymphocytes, Absolute 0.90 10*3/mm3      Monocytes, Absolute 0.75 10*3/mm3      Eosinophils, Absolute 0.16 10*3/mm3      Basophils, Absolute 0.06 10*3/mm3     COVID-19,[facility] [employer] IN-HOUSE,Nasal Swab No Transport Media 3-4 HR TAT - Swab, Nasal Cavity [[id number]]  (Normal) Collected: 03/17/21 2202    Specimen: Swab from Nasal Cavity Updated: 03/17/21 2248     COVID19 Not Detected    Manual Differential [[id number]]  (Abnormal) Collected: 03/17/21 0346    Specimen: Blood Updated: 03/17/21 0628     Neutrophil % 60.6 %      Lymphocyte % 28.3 %      Monocyte % 6.1 %      Eosinophil % 3.0 %      Basophil % 1.0 %      Atypical Lymphocyte % 1.0 %      Neutrophils Absolute 2.89 10*3/mm3      Lymphocytes Absolute 1.35 10*3/mm3      Monocytes Absolute 0.29 10*3/mm3      Eosinophils Absolute 0.14 10*3/mm3      Basophils Absolute 0.05 10*3/mm3      nRBC 2.0 /100 WBC      Anisocytosis Slight/1+     Microcytes Slight/1+     Poikilocytes Slight/1+     Polychromasia Mod/2+     WBC Morphology Normal     Giant Platelets Slight/1+    CBC Auto Differential [[id number]]  (Abnormal) Collected: 03/17/21 0346    Specimen: Blood Updated: 03/17/21 0628     WBC 4.77 10*3/mm3      RBC 4.12 10*6/mm3      Hemoglobin 8.5 g/dL      Hematocrit 28.2 %      MCV 68.4 fL      MCH 20.6 pg      MCHC 30.1 g/dL      RDW 24.9 %      RDW-SD 58.8 fl      MPV 10.4 fL      Platelets 385 10*3/mm3     Basic Metabolic Panel [[id number]]  (Normal) Collected: 03/17/21 0346    Specimen: Blood Updated: 03/17/21 0449     Glucose 89 mg/dL      BUN 9 mg/dL      Creatinine 0.59 mg/dL      Sodium 140 mmol/L      Potassium 3.7 mmol/L       Chloride 104 mmol/L      CO2 25.0 mmol/L      Calcium 8.9 mg/dL      eGFR Non African Amer 115 mL/min/1.73      BUN/Creatinine Ratio 15.3     Anion Gap 11.0 mmol/L     Folate [[id number]]  (Normal) Collected: 03/15/21 2030    Specimen: Blood Updated: 03/16/21 1302     Folate 16.20 ng/mL     Narrative:      Results may be falsely increased if patient taking Biotin.      Vitamin B12 [[id number]]  (Normal) Collected: 03/15/21 2030    Specimen: Blood Updated: 03/16/21 1302     Vitamin B-12 639 pg/mL     Narrative:      Results may be falsely increased if patient taking Biotin.      Hemoglobin & Hematocrit, Blood [[id number]]  (Abnormal) Collected: 03/16/21 0944    Specimen: Blood Updated: 03/16/21 1033     Hemoglobin 6.2 g/dL      Hematocrit 21.6 %     CBC Auto Differential [[id number]]  (Abnormal) Collected: 03/16/21 0246    Specimen: Blood Updated: 03/16/21 0418     WBC 4.91 10*3/mm3      RBC 3.06 10*6/mm3      Hemoglobin 5.5 g/dL      Hematocrit 19.4 %      MCV 63.4 fL      MCH 18.0 pg      MCHC 28.4 g/dL      RDW 21.9 %      RDW-SD 47.5 fl      MPV 10.5 fL      Platelets 426 10*3/mm3      Neutrophil % 47.1 %      Lymphocyte % 38.1 %      Monocyte % 12.0 %      Eosinophil % 1.6 %      Basophil % 1.0 %      Neutrophils, Absolute 2.31 10*3/mm3      Lymphocytes, Absolute 1.87 10*3/mm3      Monocytes, Absolute 0.59 10*3/mm3      Eosinophils, Absolute 0.08 10*3/mm3      Basophils, Absolute 0.05 10*3/mm3     Basic Metabolic Panel [[id number]]  (Normal) Collected: 03/16/21 0246    Specimen: Blood Updated: 03/16/21 0344     Glucose 94 mg/dL      BUN 9 mg/dL      Creatinine 0.61 mg/dL      Sodium 141 mmol/L      Potassium 4.0 mmol/L      Chloride 107 mmol/L      CO2 24.0 mmol/L      Calcium 9.1 mg/dL      eGFR Non African Amer 110 mL/min/1.73      BUN/Creatinine Ratio 14.8     Anion Gap 10.0 mmol/L     Narrative:      COVID-19,[facility] [employer] IN-HOUSE,Nasal Swab No Transport Media 3-4 HR TAT - Swab, Nasal Cavity [[id number]]  (Normal)  Collected: 03/15/21 2038    Specimen: Swab from Nasal Cavity Updated: 03/15/21 2118     COVID19 Not Detected    Iron Profile [[id number]]  (Abnormal) Collected: 03/15/21 1855    Specimen: Blood from Arm, Right Updated: 03/15/21 2019     Iron 10 mcg/dL      Iron Saturation 2 %      Transferrin 393 mg/dL      TIBC 586 mcg/dL     Ferritin [[id number]]  (Abnormal) Collected: 03/15/21 1855    Specimen: Blood from Arm, Right Updated: 03/15/21 2019     Ferritin 2.49 ng/mL     Narrative:      Results may be falsely decreased if patient taking Biotin.      Reticulocytes [[id number]]  (Normal) Collected: 03/15/21 1855    Specimen: Blood from Arm, Right Updated: 03/15/21 2000     Reticulocyte % 1.61 %      Reticulocyte Absolute 0.0435 10*6/mm3     Comprehensive Metabolic Panel [[id number]]  (Abnormal) Collected: 03/15/21 1855    Specimen: Blood from Arm, Right Updated: 03/15/21 1944     Glucose 105 mg/dL      BUN 9 mg/dL      Creatinine 0.55 mg/dL      Sodium 140 mmol/L      Potassium 4.1 mmol/L      Chloride 106 mmol/L      CO2 24.0 mmol/L      Calcium 9.2 mg/dL      Total Protein 6.9 g/dL      Albumin 4.30 g/dL      ALT (SGPT) 10 U/L      AST (SGOT) 18 U/L      Alkaline Phosphatase 46 U/L      Total Bilirubin 0.9 mg/dL      eGFR Non African Amer 124 mL/min/1.73      Globulin 2.6 gm/dL      A/G Ratio 1.7 g/dL      BUN/Creatinine Ratio 16.4     Anion Gap 10.0 mmol/L     Manual Differential [[id number]]  (Abnormal) Collected: 03/15/21 1855    Specimen: Blood from Arm, Right Updated: 03/15/21 1939     Neutrophil % 76.0 %      Lymphocyte % 18.0 %      Monocyte % 3.0 %      Eosinophil % 1.0 %      Basophil % 2.0 %      Neutrophils Absolute 4.92 10*3/mm3      Lymphocytes Absolute 1.16 10*3/mm3      Monocytes Absolute 0.19 10*3/mm3      Eosinophils Absolute 0.06 10*3/mm3      Basophils Absolute 0.13 10*3/mm3      Acanthocytes Slight/1+     Anisocytosis Large/3+     Dacrocytes Large/3+     Elliptocytes Mod/2+     Hypochromia Large/3+      Poikilocytes Mod/2+     Polychromasia Large/3+     Stomatocytes Slight/1+     WBC Morphology Normal     Platelet Estimate Increased     Giant Platelets Slight/1+    Urinalysis With Culture If Indicated - Urine, Clean Catch [[id number]]  (Normal) Collected: 03/15/21 1903    Specimen: Urine, Clean Catch Updated: 03/15/21 1928     Color, UA Yellow     Appearance, UA Clear     pH, UA 6.5     Specific Gravity, UA 1.007     Glucose, UA Negative     Ketones, UA Negative     Bilirubin, UA Negative     Blood, UA Negative     Protein, UA Negative     Leuk Esterase, UA Negative     Nitrite, UA Negative     Urobilinogen, UA 0.2 E.U./dL    Narrative:      Urine microscopic not indicated.    Pregnancy, Urine - Urine, Clean Catch [[id number]]  (Normal) Collected: 03/15/21 1903    Specimen: Urine, Clean Catch Updated: 03/15/21 1927     HCG, Urine QL Negative    aPTT [[id number]]  (Normal) Collected: 03/15/21 1855    Specimen: Blood from Arm, Right Updated: 03/15/21 1919     PTT 29.6 seconds     Protime-INR [[id number]]  (Abnormal) Collected: 03/15/21 1855    Specimen: Blood from Arm, Right Updated: 03/15/21 1919     Protime 15.1 Seconds      INR 1.23    CBC & Differential [[id number]]  (Abnormal) Collected: 03/15/21 1855    Specimen: Blood from Arm, Right Updated: 03/15/21 1919    Narrative:      The following orders were created for panel order CBC & Differential.  Procedure                               Abnormality         Status                     ---------                               -----------         ------                     CBC Auto Differential[[id number]]        Abnormal            Final result                 Please view results for these tests on the individual orders.    CBC Auto Differential [[id number]]  (Abnormal) Collected: 03/15/21 1855    Specimen: Blood from Arm, Right Updated: 03/15/21 1919     WBC 6.47 10*3/mm3      RBC 2.73 10*6/mm3      Hemoglobin 4.4 g/dL      Hematocrit 16.5 %      MCV 60.4 fL      MCH 16.1  pg      MCHC 26.7 g/dL      RDW 18.0 %      RDW-SD 38.5 fl      MPV 10.5 fL      Platelets 463 10*3/mm3     POC Occult Blood Stool [[id number]]  (Abnormal) Collected: 03/15/21 1901    Specimen: Stool Updated: 03/15/21 1902     Fecal Occult Blood Positive     Lot Number \[id number]\     Expiration Date \8/31/21\     DEVELOPER LOT NUMBER \187\     DEVELOPER EXPIRATION DATE \8/31/21\     Positive Control Positive     Negative Control Negative        Imaging Results (All)     Procedure Component Value Units Date/Time    CT Abdomen Pelvis With & Without Contrast [[id number]] Collected: 03/16/21 2109     Updated: 03/16/21 2118    Narrative:      EXAMINATION:  CT ABDOMEN PELVIS W WO CONTRAST-  3/16/2021 8:45 PM CDT     HISTORY: Abdominal pain, fever, post-op; K92.2-Gastrointestinal  hemorrhage, unspecified; D64.9-Anemia, unspecified.     TECHNIQUE: Spiral CT was performed of the abdomen and pelvis without and  with contrast.. Multiplanar images were reconstructed.     DLP: 1597 mGy-cm. Automated dosage control was utilized.     COMPARISON: 5/12/2020.      LUNG BASES: The lung bases are clear.     LIVER AND SPLEEN: The visualized liver and spleen are unremarkable.  There are no dense gallstones.     PANCREAS: No pancreatic mass or inflammatory change.     KIDNEYS, ADRENALS AND URINARY BLADDER: The adrenal glands and right  kidney are unremarkable. There is a 3-4 mm probable cyst in the midpole  region left kidney, too small to fully characterize. The urinary bladder  is unremarkable. The ureters are nondilated.     BOWEL: Gastric wall thickening is probably an artifact of  underdistention. Small bowel loops are nondilated. The appendix is  normal in caliber. There is a focal area of wall thickening of the  rectum seen best on axial images 76 through 79.     OTHER: The uterus and ovaries are unremarkable. Trace amount of free  fluid in the pelvis is probably physiologic. There are no pathologically  enlarged lymph nodes.        Impression:      1. Focal wall thickening of the rectum. GI consultation recommended.  Rectal neoplasm is in the differential diagnosis. Focal area of  inflammation less likely. Focal colonic contraction also possible.  2. Tiny left renal lesion is probably a small cyst. It is too small to  fully characterize.  3. Trace amount of free fluid in the pelvis is likely physiologic.  This report was finalized on 03/16/2021 21:15 by Dr. Boris Blankenship MD.        History of Present Illness on Day of Discharge: Patient seen following procedures today.  She is upset that there were no  findings to account for her symptoms.    Hospital Course:  Ms. Flor is a 35-year-old female with a medical history significant for gastroesophageal reflux disease and internal hemorrhoids.  The patient presented to an urgent care facility on 3/15/2021 with complaints of shortness of breath and weakness progressively worsening over a 1 month timeframe.  She had blood work indicating anemia and was advised to come to the emergency department.  Her hemoglobin was 4.4 on arrival to the emergency department.  Her hemoglobin was 14.1 in April 2020.  Fecal occult blood stool was positive.  CT of the abdomen pelvis showed focal wall thickening of the rectum, with rectal neoplasm in the differential.  The patient was admitted to the hospitalist service for further evaluation and management.    The patient was transfused 4 units of packed red blood cells on admission.  She is also been given 2 doses of IV Venofer for iron deficiency anemia.  Her hemoglobin is stable today at 8.3.  She was seen in consultation by gastroenterology.  She was taken for upper GI endoscopy and colonoscopy on 3/18/2021.  Endoscopy was essentially normal, biopsy was taken for H. pylori.  On colonoscopy, nonbleeding internal hemorrhoids were noted, which were noted on previous colonoscopy as well.  There was also an erythematous area of mucosa in the rectum which was biopsied.  " The patient will need to follow-up with the gastroenterology service in 2 weeks for biopsy results and any further testing which may be indicated as she still has ongoing symptoms.    Overall, the patient is hemodynamically stable and appropriate for discharge home today.  She has made an appointment with a new primary care provider to be seen in April.  We will have a CBC without differential rechecked in 1 week to ensure stability of her anemia.    Condition on Discharge:  Medically stable    Physical Exam on Discharge:  /72 (BP Location: Right arm, Patient Position: Lying)   Pulse 62   Temp 97.3 °F (36.3 °C) (Oral)   Resp 16   Ht 167.6 cm (66\")   Wt 103 kg (226 lb)   SpO2 99%   Breastfeeding No   BMI 36.48 kg/m²   Physical Exam  Vitals and nursing note reviewed.   Constitutional:       General: She is not in acute distress.     Appearance: Normal appearance. She is not ill-appearing.   HENT:      Head: Normocephalic and atraumatic.   Cardiovascular:      Rate and Rhythm: Normal rate and regular rhythm.      Pulses: Normal pulses.      Heart sounds: Normal heart sounds.   Pulmonary:      Effort: Pulmonary effort is normal.      Breath sounds: Normal breath sounds. No wheezing, rhonchi or rales.   Abdominal:      General: Bowel sounds are normal. There is no distension.      Palpations: Abdomen is soft.      Tenderness: There is no abdominal tenderness.   Musculoskeletal:         General: No swelling or tenderness. Normal range of motion.      Cervical back: Normal range of motion and neck supple. No tenderness.   Skin:     General: Skin is warm and dry.      Findings: No erythema or rash.   Neurological:      General: No focal deficit present.      Mental Status: She is alert and oriented to person, place, and time.   Psychiatric:         Behavior: Behavior normal.         Thought Content: Thought content normal.         Judgment: Judgment normal.      Comments: Upset, frustrated       Discharge " Disposition:  Home or Self Care    Discharge Medications:     Discharge Medications      New Medications      Instructions Start Date   ferrous sulfate 325 (65 FE) MG tablet  Commonly known as: FerrouSul   325 mg, Oral, Daily With Breakfast         Changes to Medications      Instructions Start Date   pantoprazole 40 MG EC tablet  Commonly known as: PROTONIX  What changed: when to take this   40 mg, Oral, 2 times daily         Continue These Medications      Instructions Start Date   CELEXA PO   Oral, Daily, DOSE UNKNOWN       cloNIDine 0.1 MG tablet  Commonly known as: CATAPRES   0.1 mg, Oral, Daily      ondansetron ODT 8 MG disintegrating tablet  Commonly known as: ZOFRAN-ODT   8 mg, Translingual, Every 8 Hours PRN      VISTARIL PO   Oral, DOSE UNKNOWN            Discharge Diet:   Diet Instructions     Diet: Regular; Thin      Discharge Diet: Regular    Fluid Consistency: Thin        Activity at Discharge:   Activity Instructions     Activity as Tolerated          Discharge Care Plan/Instructions:   1.  Return for any acute or worsening symptoms.  2.  Iron supplementation.  3.  Outpatient CBC next week.    Follow-up Appointments:   1.  Patient has made a new primary care provider appointment to be seen in April.  2.  GI in 2 weeks  Future Appointments   Date Time Provider Department Center   3/22/2021  9:30 AM Gladis Whitten APRN [facility] None     Test Results Pending at Discharge: Biopsies taken during upper GI endoscopy and colonoscopy, to be followed by gastroenterology.    Electronically signed by MARIA INES Pritchard, 3/18/2021, 14:49 CDT.    Time: 35 minutes

## 2021-03-19 LAB
CYTO UR: NORMAL
LAB AP CASE REPORT: NORMAL
PATH REPORT.FINAL DX SPEC: NORMAL
PATH REPORT.GROSS SPEC: NORMAL
UREASE TISS QL: NEGATIVE

## 2021-03-19 NOTE — PAYOR COMM NOTE
"REF:  [id number]    [facility]  BRIANNA,  [phone]  OR  FAX  [phone]     Ratna Flor (35 y.o. Female)     Date of Birth Social Security Number Address Home Phone MRN    1985  [address]   [facility] [id number] [phone] [id number]    Confucianist Marital Status          Other        Admission Date Admission Type Admitting Provider Attending Provider Department, Room/Bed    3/15/21 Emergency Rashaad Shelby MD  [facility] 3C, 379/1    Discharge Date Discharge Disposition Discharge Destination        3/18/2021 Home or Self Care              Attending Provider: (none)   Allergies: No Known Allergies    Isolation: None   Infection: None   Code Status: Prior    Ht: 167.6 cm (66\")   Wt: 103 kg (226 lb)    Admission Cmt: None   Principal Problem: Symptomatic anemia [D64.9]                 Active Insurance as of 3/15/2021     Primary Coverage     Payor Plan Insurance Group Employer/Plan Group    [facility] BLUE CROSS [facility] [facility] [employer] [id number]     Payor Plan Address Payor Plan Phone Number Payor Plan Fax Number Effective Dates    [address] [phone]  10/1/2018 - None Entered    [place] [id number]       Subscriber Name Subscriber Birth Date Member ID       RATNA FLOR 1985 [id number]                 Emergency Contacts      (Rel.) Home Phone Work Phone Mobile Phone    BONNIE FLOR (Spouse) [phone] -- [phone]               Discharge Summary      Jailene Thomas APRN at 03/18/21 1449     Attestation signed by Rashaad Shelby MD at 03/18/21 1655    Discussed with LINA Arredondo. Also discussed with Dr. Hernandez on CT enterography and M2A capsule if appropriate,  Will defer further care to him.  Otherwise medically stable and appropriate for discharge.  EGD and C scope done today. Findings are as outlined below  Hgb prior to d/c is 8.3    Electronically signed by Rashaad Shelby MD, [unfilled], " 16:55 CDT.                          [facility] Medicine Services  DISCHARGE SUMMARY       Date of Admission: 3/15/2021  Date of Discharge:  3/18/2021  Primary Care Physician: Mono Tiwari MD    Presenting Problem/History of Present Illness:  Weakness, shortness of breath    Final Discharge Diagnoses:  Active Hospital Problems    Diagnosis    • **Symptomatic anemia    • Abnormal CT scan abdomen    • Iron deficiency anemia due to chronic blood loss    • Gastrointestinal hemorrhage    • Gastroesophageal reflux disease with esophagitis      Seen on endoscopy June 2020.  Patient started on PPI.     • Rectal bleeding      Added automatically from request for surgery [id number]     • Other hemorrhoids      Added automatically from request for surgery [id number]       Consults: Gastroenterology    Procedures Performed:   3/18/2021-upper GI endoscopy  - Z-line irregular. Biopsied.  - Normal stomach. Biopsied.  - Normal first portion of the duodenum and second portion of the duodenum.    3/18/2021-colonoscopy  - The examined portion of the ileum was normal.  - Erythematous mucosa in the rectum. Biopsied.  - Non-bleeding internal hemorrhoids.    Pertinent Test Results:   Lab Results (all)     Procedure Component Value Units Date/Time    Urease For H Pylori - Tissue, Stomach [[id number]] Collected: 03/18/21 1244    Specimen: Tissue from Stomach Updated: 03/18/21 1428    Basic Metabolic Panel [[id number]]  (Abnormal) Collected: 03/18/21 0336    Specimen: Blood Updated: 03/18/21 0453     Glucose 96 mg/dL      BUN 8 mg/dL      Creatinine 0.57 mg/dL      Sodium 145 mmol/L      Potassium 3.8 mmol/L      Chloride 111 mmol/L      CO2 23.0 mmol/L      Calcium 8.7 mg/dL      eGFR Non African Amer 119 mL/min/1.73      BUN/Creatinine Ratio 14.0     Anion Gap 11.0 mmol/L     CBC Auto Differential [[id number]]  (Abnormal) Collected: 03/18/21 0336    Specimen: Blood Updated: 03/18/21 0441     WBC 6.01 10*3/mm3       RBC 4.06 10*6/mm3      Hemoglobin 8.3 g/dL      Hematocrit 27.9 %      MCV 68.7 fL      MCH 20.4 pg      MCHC 29.7 g/dL      RDW 25.4 %      RDW-SD 59.7 fl      MPV 10.4 fL      Platelets 352 10*3/mm3      Neutrophil % 68.5 %      Lymphocyte % 15.0 %      Monocyte % 12.5 %      Eosinophil % 2.7 %      Basophil % 1.0 %      Neutrophils, Absolute 4.12 10*3/mm3      Lymphocytes, Absolute 0.90 10*3/mm3      Monocytes, Absolute 0.75 10*3/mm3      Eosinophils, Absolute 0.16 10*3/mm3      Basophils, Absolute 0.06 10*3/mm3     COVID-19,[facility] IN-HOUSE,Nasal Swab No Transport Media 3-4 HR TAT - Swab, Nasal Cavity [[id number]]  (Normal) Collected: 03/17/21 2202    Specimen: Swab from Nasal Cavity Updated: 03/17/21 2248     COVID19 Not Detected    Manual Differential [[id number]]  (Abnormal) Collected: 03/17/21 0346    Specimen: Blood Updated: 03/17/21 0628     Neutrophil % 60.6 %      Lymphocyte % 28.3 %      Monocyte % 6.1 %      Eosinophil % 3.0 %      Basophil % 1.0 %      Atypical Lymphocyte % 1.0 %      Neutrophils Absolute 2.89 10*3/mm3      Lymphocytes Absolute 1.35 10*3/mm3      Monocytes Absolute 0.29 10*3/mm3      Eosinophils Absolute 0.14 10*3/mm3      Basophils Absolute 0.05 10*3/mm3      nRBC 2.0 /100 WBC      Anisocytosis Slight/1+     Microcytes Slight/1+     Poikilocytes Slight/1+     Polychromasia Mod/2+     WBC Morphology Normal     Giant Platelets Slight/1+    CBC Auto Differential [[id number]]  (Abnormal) Collected: 03/17/21 0346    Specimen: Blood Updated: 03/17/21 0628     WBC 4.77 10*3/mm3      RBC 4.12 10*6/mm3      Hemoglobin 8.5 g/dL      Hematocrit 28.2 %      MCV 68.4 fL      MCH 20.6 pg      MCHC 30.1 g/dL      RDW 24.9 %      RDW-SD 58.8 fl      MPV 10.4 fL      Platelets 385 10*3/mm3     Basic Metabolic Panel [[id number]]  (Normal) Collected: 03/17/21 0346    Specimen: Blood Updated: 03/17/21 2499     Glucose 89 mg/dL      BUN 9 mg/dL      Creatinine 0.59 mg/dL      Sodium 140 mmol/L       Potassium 3.7 mmol/L      Chloride 104 mmol/L      CO2 25.0 mmol/L      Calcium 8.9 mg/dL      eGFR Non African Amer 115 mL/min/1.73      BUN/Creatinine Ratio 15.3     Anion Gap 11.0 mmol/L     Folate [[id number]]  (Normal) Collected: 03/15/21 2030    Specimen: Blood Updated: 03/16/21 1302     Folate 16.20 ng/mL     Narrative:      Results may be falsely increased if patient taking Biotin.      Vitamin B12 [[id number]]  (Normal) Collected: 03/15/21 2030    Specimen: Blood Updated: 03/16/21 1302     Vitamin B-12 639 pg/mL     Narrative:      Results may be falsely increased if patient taking Biotin.      Hemoglobin & Hematocrit, Blood [[id number]]  (Abnormal) Collected: 03/16/21 0944    Specimen: Blood Updated: 03/16/21 1033     Hemoglobin 6.2 g/dL      Hematocrit 21.6 %     CBC Auto Differential [[id number]]  (Abnormal) Collected: 03/16/21 0246    Specimen: Blood Updated: 03/16/21 0418     WBC 4.91 10*3/mm3      RBC 3.06 10*6/mm3      Hemoglobin 5.5 g/dL      Hematocrit 19.4 %      MCV 63.4 fL      MCH 18.0 pg      MCHC 28.4 g/dL      RDW 21.9 %      RDW-SD 47.5 fl      MPV 10.5 fL      Platelets 426 10*3/mm3      Neutrophil % 47.1 %      Lymphocyte % 38.1 %      Monocyte % 12.0 %      Eosinophil % 1.6 %      Basophil % 1.0 %      Neutrophils, Absolute 2.31 10*3/mm3      Lymphocytes, Absolute 1.87 10*3/mm3      Monocytes, Absolute 0.59 10*3/mm3      Eosinophils, Absolute 0.08 10*3/mm3      Basophils, Absolute 0.05 10*3/mm3     Basic Metabolic Panel [[id number]]  (Normal) Collected: 03/16/21 0246    Specimen: Blood Updated: 03/16/21 0344     Glucose 94 mg/dL      BUN 9 mg/dL      Creatinine 0.61 mg/dL      Sodium 141 mmol/L      Potassium 4.0 mmol/L      Chloride 107 mmol/L      CO2 24.0 mmol/L      Calcium 9.1 mg/dL      eGFR Non African Amer 110 mL/min/1.73      BUN/Creatinine Ratio 14.8     Anion Gap 10.0 mmol/L     Narrative:      COVID-19,[facility] IN-HOUSE,Nasal Swab No Transport Media 3-4 HR TAT - Swab, Nasal Cavity  [[id number]]  (Normal) Collected: 03/15/21 2038    Specimen: Swab from Nasal Cavity Updated: 03/15/21 2118     COVID19 Not Detected    Iron Profile [[id number]]  (Abnormal) Collected: 03/15/21 1855    Specimen: Blood from Arm, Right Updated: 03/15/21 2019     Iron 10 mcg/dL      Iron Saturation 2 %      Transferrin 393 mg/dL      TIBC 586 mcg/dL     Ferritin [[id number]]  (Abnormal) Collected: 03/15/21 1855    Specimen: Blood from Arm, Right Updated: 03/15/21 2019     Ferritin 2.49 ng/mL     Narrative:      Results may be falsely decreased if patient taking Biotin.      Reticulocytes [[id number]]  (Normal) Collected: 03/15/21 1855    Specimen: Blood from Arm, Right Updated: 03/15/21 2000     Reticulocyte % 1.61 %      Reticulocyte Absolute 0.0435 10*6/mm3     Comprehensive Metabolic Panel [[id number]]  (Abnormal) Collected: 03/15/21 1855    Specimen: Blood from Arm, Right Updated: 03/15/21 1944     Glucose 105 mg/dL      BUN 9 mg/dL      Creatinine 0.55 mg/dL      Sodium 140 mmol/L      Potassium 4.1 mmol/L      Chloride 106 mmol/L      CO2 24.0 mmol/L      Calcium 9.2 mg/dL      Total Protein 6.9 g/dL      Albumin 4.30 g/dL      ALT (SGPT) 10 U/L      AST (SGOT) 18 U/L      Alkaline Phosphatase 46 U/L      Total Bilirubin 0.9 mg/dL      eGFR Non African Amer 124 mL/min/1.73      Globulin 2.6 gm/dL      A/G Ratio 1.7 g/dL      BUN/Creatinine Ratio 16.4     Anion Gap 10.0 mmol/L     Manual Differential [[id number]]  (Abnormal) Collected: 03/15/21 1855    Specimen: Blood from Arm, Right Updated: 03/15/21 1939     Neutrophil % 76.0 %      Lymphocyte % 18.0 %      Monocyte % 3.0 %      Eosinophil % 1.0 %      Basophil % 2.0 %      Neutrophils Absolute 4.92 10*3/mm3      Lymphocytes Absolute 1.16 10*3/mm3      Monocytes Absolute 0.19 10*3/mm3      Eosinophils Absolute 0.06 10*3/mm3      Basophils Absolute 0.13 10*3/mm3      Acanthocytes Slight/1+     Anisocytosis Large/3+     Dacrocytes Large/3+     Elliptocytes Mod/2+      Hypochromia Large/3+     Poikilocytes Mod/2+     Polychromasia Large/3+     Stomatocytes Slight/1+     WBC Morphology Normal     Platelet Estimate Increased     Giant Platelets Slight/1+    Urinalysis With Culture If Indicated - Urine, Clean Catch [[id number]]  (Normal) Collected: 03/15/21 1903    Specimen: Urine, Clean Catch Updated: 03/15/21 1928     Color, UA Yellow     Appearance, UA Clear     pH, UA 6.5     Specific Gravity, UA 1.007     Glucose, UA Negative     Ketones, UA Negative     Bilirubin, UA Negative     Blood, UA Negative     Protein, UA Negative     Leuk Esterase, UA Negative     Nitrite, UA Negative     Urobilinogen, UA 0.2 E.U./dL    Narrative:      Urine microscopic not indicated.    Pregnancy, Urine - Urine, Clean Catch [[id number]]  (Normal) Collected: 03/15/21 1903    Specimen: Urine, Clean Catch Updated: 03/15/21 1927     HCG, Urine QL Negative    aPTT [[id number]]  (Normal) Collected: 03/15/21 1855    Specimen: Blood from Arm, Right Updated: 03/15/21 1919     PTT 29.6 seconds     Protime-INR [[id number]]  (Abnormal) Collected: 03/15/21 1855    Specimen: Blood from Arm, Right Updated: 03/15/21 1919     Protime 15.1 Seconds      INR 1.23    CBC & Differential [[id number]]  (Abnormal) Collected: 03/15/21 1855    Specimen: Blood from Arm, Right Updated: 03/15/21 1919    Narrative:      The following orders were created for panel order CBC & Differential.  Procedure                               Abnormality         Status                     ---------                               -----------         ------                     CBC Auto Differential[[id number]]        Abnormal            Final result                 Please view results for these tests on the individual orders.    CBC Auto Differential [[id number]]  (Abnormal) Collected: 03/15/21 1855    Specimen: Blood from Arm, Right Updated: 03/15/21 1919     WBC 6.47 10*3/mm3      RBC 2.73 10*6/mm3      Hemoglobin 4.4 g/dL      Hematocrit 16.5 %       MCV 60.4 fL      MCH 16.1 pg      MCHC 26.7 g/dL      RDW 18.0 %      RDW-SD 38.5 fl      MPV 10.5 fL      Platelets 463 10*3/mm3     POC Occult Blood Stool [[id number]]  (Abnormal) Collected: 03/15/21 1901    Specimen: Stool Updated: 03/15/21 1902     Fecal Occult Blood Positive     Lot Number \[id number]\     Expiration Date \8/31/21\     DEVELOPER LOT NUMBER \187\     DEVELOPER EXPIRATION DATE \8/31/21\     Positive Control Positive     Negative Control Negative        Imaging Results (All)     Procedure Component Value Units Date/Time    CT Abdomen Pelvis With & Without Contrast [[id number]] Collected: 03/16/21 2109     Updated: 03/16/21 2118    Narrative:      EXAMINATION:  CT ABDOMEN PELVIS W WO CONTRAST-  3/16/2021 8:45 PM CDT     HISTORY: Abdominal pain, fever, post-op; K92.2-Gastrointestinal  hemorrhage, unspecified; D64.9-Anemia, unspecified.     TECHNIQUE: Spiral CT was performed of the abdomen and pelvis without and  with contrast.. Multiplanar images were reconstructed.     DLP: 1597 mGy-cm. Automated dosage control was utilized.     COMPARISON: 5/12/2020.      LUNG BASES: The lung bases are clear.     LIVER AND SPLEEN: The visualized liver and spleen are unremarkable.  There are no dense gallstones.     PANCREAS: No pancreatic mass or inflammatory change.     KIDNEYS, ADRENALS AND URINARY BLADDER: The adrenal glands and right  kidney are unremarkable. There is a 3-4 mm probable cyst in the midpole  region left kidney, too small to fully characterize. The urinary bladder  is unremarkable. The ureters are nondilated.     BOWEL: Gastric wall thickening is probably an artifact of  underdistention. Small bowel loops are nondilated. The appendix is  normal in caliber. There is a focal area of wall thickening of the  rectum seen best on axial images 76 through 79.     OTHER: The uterus and ovaries are unremarkable. Trace amount of free  fluid in the pelvis is probably physiologic. There are no pathologically  enlarged  lymph nodes.       Impression:      1. Focal wall thickening of the rectum. GI consultation recommended.  Rectal neoplasm is in the differential diagnosis. Focal area of  inflammation less likely. Focal colonic contraction also possible.  2. Tiny left renal lesion is probably a small cyst. It is too small to  fully characterize.  3. Trace amount of free fluid in the pelvis is likely physiologic.  This report was finalized on 03/16/2021 21:15 by Dr. Boris Blankenship MD.        History of Present Illness on Day of Discharge: Patient seen following procedures today.  She is upset that there were no  findings to account for her symptoms.    Hospital Course:  Ms. Flor is a 35-year-old female with a medical history significant for gastroesophageal reflux disease and internal hemorrhoids.  The patient presented to an urgent care facility on 3/15/2021 with complaints of shortness of breath and weakness progressively worsening over a 1 month timeframe.  She had blood work indicating anemia and was advised to come to the emergency department.  Her hemoglobin was 4.4 on arrival to the emergency department.  Her hemoglobin was 14.1 in April 2020.  Fecal occult blood stool was positive.  CT of the abdomen pelvis showed focal wall thickening of the rectum, with rectal neoplasm in the differential.  The patient was admitted to the hospitalist service for further evaluation and management.    The patient was transfused 4 units of packed red blood cells on admission.  She is also been given 2 doses of IV Venofer for iron deficiency anemia.  Her hemoglobin is stable today at 8.3.  She was seen in consultation by gastroenterology.  She was taken for upper GI endoscopy and colonoscopy on 3/18/2021.  Endoscopy was essentially normal, biopsy was taken for H. pylori.  On colonoscopy, nonbleeding internal hemorrhoids were noted, which were noted on previous colonoscopy as well.  There was also an erythematous area of mucosa in the rectum  "which was biopsied.  The patient will need to follow-up with the gastroenterology service in 2 weeks for biopsy results and any further testing which may be indicated as she still has ongoing symptoms.    Overall, the patient is hemodynamically stable and appropriate for discharge home today.  She has made an appointment with a new primary care provider to be seen in April.  We will have a CBC without differential rechecked in 1 week to ensure stability of her anemia.    Condition on Discharge:  Medically stable    Physical Exam on Discharge:  /72 (BP Location: Right arm, Patient Position: Lying)   Pulse 62   Temp 97.3 °F (36.3 °C) (Oral)   Resp 16   Ht 167.6 cm (66\")   Wt 103 kg (226 lb)   SpO2 99%   Breastfeeding No   BMI 36.48 kg/m²   Physical Exam  Vitals and nursing note reviewed.   Constitutional:       General: She is not in acute distress.     Appearance: Normal appearance. She is not ill-appearing.   HENT:      Head: Normocephalic and atraumatic.   Cardiovascular:      Rate and Rhythm: Normal rate and regular rhythm.      Pulses: Normal pulses.      Heart sounds: Normal heart sounds.   Pulmonary:      Effort: Pulmonary effort is normal.      Breath sounds: Normal breath sounds. No wheezing, rhonchi or rales.   Abdominal:      General: Bowel sounds are normal. There is no distension.      Palpations: Abdomen is soft.      Tenderness: There is no abdominal tenderness.   Musculoskeletal:         General: No swelling or tenderness. Normal range of motion.      Cervical back: Normal range of motion and neck supple. No tenderness.   Skin:     General: Skin is warm and dry.      Findings: No erythema or rash.   Neurological:      General: No focal deficit present.      Mental Status: She is alert and oriented to person, place, and time.   Psychiatric:         Behavior: Behavior normal.         Thought Content: Thought content normal.         Judgment: Judgment normal.      Comments: Upset, frustrated "       Discharge Disposition:  Home or Self Care    Discharge Medications:     Discharge Medications      New Medications      Instructions Start Date   ferrous sulfate 325 (65 FE) MG tablet  Commonly known as: FerrouSul   325 mg, Oral, Daily With Breakfast         Changes to Medications      Instructions Start Date   pantoprazole 40 MG EC tablet  Commonly known as: PROTONIX  What changed: when to take this   40 mg, Oral, 2 times daily         Continue These Medications      Instructions Start Date   CELEXA PO   Oral, Daily, DOSE UNKNOWN       cloNIDine 0.1 MG tablet  Commonly known as: CATAPRES   0.1 mg, Oral, Daily      ondansetron ODT 8 MG disintegrating tablet  Commonly known as: ZOFRAN-ODT   8 mg, Translingual, Every 8 Hours PRN      VISTARIL PO   Oral, DOSE UNKNOWN            Discharge Diet:   Diet Instructions     Diet: Regular; Thin      Discharge Diet: Regular    Fluid Consistency: Thin        Activity at Discharge:   Activity Instructions     Activity as Tolerated          Discharge Care Plan/Instructions:   1.  Return for any acute or worsening symptoms.  2.  Iron supplementation.  3.  Outpatient CBC next week.    Follow-up Appointments:   1.  Patient has made a new primary care provider appointment to be seen in April.  2.  GI in 2 weeks  Future Appointments   Date Time Provider Department Center   3/22/2021  9:30 AM Gladis Whitten APRN [facility] None     Test Results Pending at Discharge: Biopsies taken during upper GI endoscopy and colonoscopy, to be followed by gastroenterology.    Electronically signed by MARIA INES Pritchard, 3/18/2021, 14:49 CDT.    Time: 35 minutes          Electronically signed by Rashaad Shelby MD at 03/18/21 6672

## 2021-03-20 ENCOUNTER — NURSE TRIAGE (OUTPATIENT)
Dept: CALL CENTER | Facility: HOSPITAL | Age: 36
End: 2021-03-20

## 2021-03-20 NOTE — TELEPHONE ENCOUNTER
"Caller states she was just discharged from he hospital.  She does not have a prescription for iron tablets which is on her AVS.  Advised that you can buy iron tablets over the counter and that is probably why she did not receive a prescription.      Reason for Disposition  • Caller has medication question, adult has minor symptoms, caller declines triage, AND triager answers question    Additional Information  • Negative: Drug overdose and triager unable to answer question  • Negative: Caller requesting information unrelated to medicine  • Negative: Caller requesting a prescription for Strep throat and has a positive culture result  • Negative: Rash while taking a medication or within 3 days of stopping it  • Negative: Immunization reaction suspected  • Negative: [1] Asthma and [2] having symptoms of asthma (cough, wheezing, etc.)  • Negative: [1] Influenza symptoms AND [2] anti-viral med prescription request, such as Tamiflu  • Negative: [1] Symptom of illness (e.g., headache, abdominal pain, earache, vomiting) AND [2] more than mild  • Negative: MORE THAN A DOUBLE DOSE of a prescription or over-the-counter (OTC) drug  • Negative: [1] DOUBLE DOSE (an extra dose or lesser amount) of over-the-counter (OTC) drug AND [2] any symptoms (e.g., dizziness, nausea, pain, sleepiness)  • Negative: [1] DOUBLE DOSE (an extra dose or lesser amount) of prescription drug AND [2] any symptoms (e.g., dizziness, nausea, pain, sleepiness)  • Negative: Took another person's prescription drug  • Negative: [1] DOUBLE DOSE (an extra dose or lesser amount) of prescription drug AND [2] NO symptoms (Exception: a double dose of antibiotics)  • Negative: Diabetes drug error or overdose (e.g., took wrong type of insulin or took extra dose)  • Negative: [1] Request for URGENT new prescription or refill of \"essential\" medication (i.e., likelihood of harm to patient if not taken) AND [2] triager unable to fill per unit policy  • Negative: [1] " "Prescription not at pharmacy AND [2] was prescribed by PCP recently  • Negative: [1] Pharmacy calling with prescription questions AND [2] triager unable to answer question  • Negative: [1] Caller has URGENT medication question about med that PCP or specialist prescribed AND [2] triager unable to answer question  • Negative: [1] Caller has NON-URGENT medication question about med that PCP prescribed AND [2] triager unable to answer question  • Negative: [1] Caller requesting a NON-URGENT new prescription or refill AND [2] triager unable to refill per unit policy  • Negative: [1] Caller has medication question about med not prescribed by PCP AND [2] triager unable to answer question (e.g., compatibility with other med, storage)  • Negative: Caller requesting a CONTROLLED substance prescription refill (e.g., narcotics, ADHD medicines)  • Negative: Caller wants to use a complementary or alternative medicine  • Negative: [1] Prescription prescribed recently is not at pharmacy AND [2] triager has access to patient's EMR AND [3] prescription is recorded in the EMR  • Negative: [1] DOUBLE DOSE (an extra dose or lesser amount) of over-the-counter (OTC) drug AND [2] NO symptoms  • Negative: [1] DOUBLE DOSE (an extra dose or lesser amount) of antibiotic drug AND [2] NO symptoms  • Negative: Caller has medication question only, adult not sick, and triager answers question    Answer Assessment - Initial Assessment Questions  1.   NAME of MEDICATION: \"What medicine are you calling about?\"      Iron tablets.    2.   QUESTION: \"What is your question?\"      They are listed on her AVS but pharmacy says she does not have a prescripgion.    3.   PRESCRIBING HCP: \"Who prescribed it?\" Reason: if prescribed by specialist, call should be referred to that group.      Jailene Thomas,   4. SYMPTOMS: \"Do you have any symptoms?\"      No   5. SEVERITY: If symptoms are present, ask \"Are they mild, moderate or severe?\"      No   6.  PREGNANCY:  " "\"Is there any chance that you are pregnant?\" \"When was your last menstrual period?\"      No    Protocols used: MEDICATION QUESTION CALL-ADULT-      "

## 2021-03-24 ENCOUNTER — LAB (OUTPATIENT)
Dept: LAB | Facility: HOSPITAL | Age: 36
End: 2021-03-24

## 2021-03-24 DIAGNOSIS — D64.9 SYMPTOMATIC ANEMIA: ICD-10-CM

## 2021-03-24 DIAGNOSIS — D50.0 IRON DEFICIENCY ANEMIA DUE TO CHRONIC BLOOD LOSS: ICD-10-CM

## 2021-03-24 LAB
DEPRECATED RDW RBC AUTO: 70.6 FL (ref 37–54)
ERYTHROCYTE [DISTWIDTH] IN BLOOD BY AUTOMATED COUNT: 28.5 % (ref 12.3–15.4)
HCT VFR BLD AUTO: 34.7 % (ref 34–46.6)
HGB BLD-MCNC: 10.2 G/DL (ref 12–15.9)
MCH RBC QN AUTO: 21.5 PG (ref 26.6–33)
MCHC RBC AUTO-ENTMCNC: 29.4 G/DL (ref 31.5–35.7)
MCV RBC AUTO: 73.2 FL (ref 79–97)
PLATELET # BLD AUTO: 193 10*3/MM3 (ref 140–450)
PMV BLD AUTO: 10.4 FL (ref 6–12)
RBC # BLD AUTO: 4.74 10*6/MM3 (ref 3.77–5.28)
WBC # BLD AUTO: 5.55 10*3/MM3 (ref 3.4–10.8)

## 2021-03-24 PROCEDURE — 85027 COMPLETE CBC AUTOMATED: CPT

## 2021-03-26 ENCOUNTER — TELEPHONE (OUTPATIENT)
Dept: FAMILY MEDICINE CLINIC | Facility: CLINIC | Age: 36
End: 2021-03-26

## 2021-03-26 NOTE — TELEPHONE ENCOUNTER
----- Message from Ratna Flor sent at 3/25/2021 10:21 PM CDT -----  Regarding: Test Results Question  Contact: [phone]  Hi, I went in for labs yesterday, but still haven't heard results.     Usually I would be okay waiting, but last week I was admitted to the hospital for the first time, because my hemoglobin was down to a 4.4 and ended up getting 4 blood transfusions (hemoglobin up to 8.5 now) and after 3 different exams, they still really don't know what is going on internally why I keep having blood in my stool.     This lab, yesterday, was a follow up and I am anxious to see where it is at, because I am still losing blood when I go to the bathroom and worries where I am at. Thank you.

## 2021-03-26 NOTE — TELEPHONE ENCOUNTER
We didn't order her labs, haven't seen her.  I'm not sure why she was asking us for the result, but do you want me to call her with the result?  The ordering provider may have been from the hospital.

## 2021-04-01 NOTE — PROGRESS NOTES
Chief Complaint:   Chief Complaint   Patient presents with   • Follow-up     Pt presents today for hospital follow up-was in [facility] 3/15/21-had endo/colon 3/18/21; Pt states since discharge she is feeling better-has more energy than she has had in 1 year; Pt does state she still sees blood in the toilet occasionally-sometimes it's BRB and other times it is darker         Patient ID: Ratna Flor is a 35 y.o. female     History of Present Illness: This is a very pleasant 35-year-old female is here to follow-up status post GI bleed and hospitalization.    The patient was recently admitted to [facility] on 3/15/2021 for complaints of shortness of breath and weakness progressing over 1 month period.  Symptomatic anemia, abnormal CT scan of abdomen still intestinal hemorrhage.  Hemoglobin on arrival was 4.4.  fecal occult blood stool was positive.  CT of abdomen and pelvis showed focal wall thickening of the rectum with rectal neoplasm and differential.  Patient received 4 units of packed red blood cells, 2 doses of IV Venofer.  On 3/18/2021 patient underwent EGD found to be normal.  Patient underwent colonoscopy on the same date with findings of erythema mucosa in the rectum biopsied and nonbleeding internal hemorrhoids.  H. pylori was negative.  Biopsies resulted as noted below.  Most recent hemoglobin 3/24/2021 was 10.2.      The patient tells me today that she is feeling so much better.  She recently had blood rechecked as noted above.  She states she has seen some episodes of blood in her stool but only a small amount.The patient denies any nausea, vomiting, epigastric pain, dysphagia, pyrosis or hematemesis.  The patient denies any fever or chills.  Denies any melena.  Denies any unintentional weight loss or loss of appetite.      The patient underwent colonoscopy on 5/22/2019 per Dr. Octavia Mattson for constipation with findings of nonbleeding internal hemorrhoids otherwise normal.  The patient underwent an EGD on  6/17/2020 for heartburn and nausea with findings of LA grade a reflux esophagitis.      Tissue Pathology Exam: [id number]  Order: [id number]  Status:  Final result   Visible to patient:  Yes (Paulino) Next appt:  04/02/2021 at 09:30 AM in Gastroenterology (Gladis Whitten, APRN) Dx:  Gastrointestinal hemorrhage, unspecif...  Specimen Information: A: Gastric, Body; Tissue    B: Esophagus; Tissue    C: Large Intestine; Tissue       Final Diagnosis   1.  Random stomach, biopsy:  Gastric mucosa with no significant pathologic changes.    2.  Gastroesophageal junction, biopsies:  Mild chronic inflammation.  No evidence of intestinal metaplasia or dysplasia.    3.  Rectum, biopsy:  Mild hyperplastic changes with vascular congestion and focal lamina propria hemorrhage.     Study Result    Narrative & Impression   EXAMINATION:  CT ABDOMEN PELVIS W WO CONTRAST-  3/16/2021 8:45 PM CDT     HISTORY: Abdominal pain, fever, post-op; K92.2-Gastrointestinal  hemorrhage, unspecified; D64.9-Anemia, unspecified.     TECHNIQUE: Spiral CT was performed of the abdomen and pelvis without and  with contrast.. Multiplanar images were reconstructed.     DLP: 1597 mGy-cm. Automated dosage control was utilized.     COMPARISON: 5/12/2020.      IMPRESSION:  1. Focal wall thickening of the rectum. GI consultation recommended.  Rectal neoplasm is in the differential diagnosis. Focal area of  inflammation less likely. Focal colonic contraction also possible.  2. Tiny left renal lesion is probably a small cyst. It is too small to  fully characterize.  3. Trace amount of free fluid in the pelvis is likely physiologic.  This report was finalized on 03/16/2021 21:15 by Dr. Boris Blankenship MD.      Results for EMERALD BALTAZAR (MRN [id number]) as of 4/1/2021 15:46   Ref. Range 5/15/2019 09:51 4/8/2020 14:48 3/15/2021 16:13 3/15/2021 18:55 3/16/2021 02:46 3/16/2021 09:44 3/17/2021 03:46 3/18/2021 03:36 3/24/2021 13:17   Hemoglobin Latest Ref Range: 12.0 - 15.9  g/dL 11.9 (L) 14.1 4.6 (C) 4.4 (C) 5.5 (C) 6.2 (C) 8.5 (L) 8.3 (L) 10.2 (L)       Past Medical History:   Diagnosis Date   • Anxiety    • GERD (gastroesophageal reflux disease)    • Hemorrhoids    • IBS (irritable bowel syndrome)        Past Surgical History:   Procedure Laterality Date   • COLONOSCOPY N/A 5/22/2019    Non-bleeding internal hemorrhoids; The examination was otherwise normal; No specimens collected; Repeat colonoscopy at age 48 for screening purposes   • COLONOSCOPY N/A 3/18/2021    Dr. Hernandez-The examined portion of the ileum was normal; Erythematous mucosa in the rectum-biopsied; Non-bleeding internal hemorrhoids   • ENDOSCOPY N/A 6/17/2020    LA Grade A reflux esophagitis; Normal stomach-biopsied; Normal examined duodenum   • ENDOSCOPY N/A 3/18/2021    Dr. Hernandez-Z-line irregular-biopsied; Normal stomach-biopsied; Normal first portion of the duodenum and second portion of the duodenum   • WISDOM TOOTH EXTRACTION           Current Outpatient Medications:   •  ferrous sulfate (FerrouSul) 325 (65 FE) MG tablet, Take 1 tablet by mouth Daily With Breakfast. (Patient taking differently: Take 325 mg by mouth Every 72 (Seventy-Two) Hours.), Disp: , Rfl:   •  ondansetron ODT (ZOFRAN-ODT) 8 MG disintegrating tablet, Place 1 tablet on the tongue Every 8 (Eight) Hours As Needed for Nausea or Vomiting., Disp: 15 tablet, Rfl: 0  •  pantoprazole (PROTONIX) 40 MG EC tablet, Take 1 tablet by mouth 2 (two) times a day., Disp: 60 tablet, Rfl: 2    No Known Allergies    Social History     Socioeconomic History   • Marital status:      Spouse name: Not on file   • Number of children: Not on file   • Years of education: Not on file   • Highest education level: Not on file   Tobacco Use   • Smoking status: Former Smoker     Types: Cigarettes   • Smokeless tobacco: Never Used   Vaping Use   • Vaping Use: Never used   Substance and Sexual Activity   • Alcohol use: Yes     Comment: Occasionally   • Drug use: No  "  • Sexual activity: Defer       Family History   Problem Relation Age of Onset   • Colon cancer Neg Hx    • Colon polyps Neg Hx    • Esophageal cancer Neg Hx    • Liver cancer Neg Hx    • Stomach cancer Neg Hx    • Rectal cancer Neg Hx    • Liver disease Neg Hx        Vitals:    04/02/21 0923   BP: 128/74   BP Location: Left arm   Patient Position: Sitting   Cuff Size: Adult   Pulse: 90   Temp: 97.3 °F (36.3 °C)   TempSrc: Infrared   SpO2: 99%   Weight: 103 kg (227 lb)   Height: 167.6 cm (66\")       Review of Systems:    General:    Present -feeling well   Skin:    Not Present-Rash   HEENT:     Not Present-Acute visual changes or Acute hearing changes   Neck :    Not Present- swollen glands   Genitourinary:      Not Present- burning, frequency, urgency hematuria, dysuria,   Cardiovascular:   Not Present-chest pain, palpitations, or pressure   Respiratory:   Not Present- shortness of breath or cough   Gastrointestinal:  Musculoskeletal:  Neurological:  Psychiatric:   Present as mentioned in the HP    Not Present. Recent gait disturbances.    Not Present-Seizures and weakness in extremities.    Not Present- Anxiety or Depression.       Physical Exam:    General Appearance:    Alert, cooperative, in no acute distress   Psych:    Mood appropriate    Eyes:          conjunctivae and sclerae normal, no   icterus, no pallor   ENMT:    Ears appear intact with no abnormalities noted oral mucosa moist   Neck:   No adenopathy, supple, trachea midline, no thyromegaly, no   carotid bruit, no JVD    Cardiovascular:    Regular rhythm and normal rate, normal S1 and S2, no            murmur, no gallop, no rub, no click   Gastrointestinal:     Inspection normal.  Normal bowel sounds, no masses, no organomegaly, soft round non-tender, non-distended, no guarding, no rebound or tenderness. No hepatosplenomegaly.   Skin:   No bleeding, bruising or rash   Neurologic:   nonfocal       Lab Results - Last 18 Months   Lab Units 03/18/21  0336 " 03/17/21  0346 03/16/21  0246 03/15/21  1855 03/15/21  1613 05/12/20  1220 04/08/20  1448   GLUCOSE mg/dL 96 89 94 105* 94  --  92   BUN mg/dL 8 9 9 9 10  --  12   CREATININE mg/dL 0.57 0.59 0.61 0.55* 0.55* 0.60 0.59   SODIUM mmol/L 145 140 141 140 138  --  144   POTASSIUM mmol/L 3.8 3.7 4.0 4.1 4.2  --  4.0   CHLORIDE mmol/L 111* 104 107 106 105  --  105   CO2 mmol/L 23.0 25.0 24.0 24.0 23.0  --  26.0   TOTAL PROTEIN g/dL  --   --   --  6.9 6.9  --  8.2   ALBUMIN g/dL  --   --   --  4.30 4.40  --  4.50   ALT (SGPT) U/L  --   --   --  10 9  --  13   AST (SGOT) U/L  --   --   --  18 16  --  19   ALK PHOS U/L  --   --   --  46 48  --  59   BILIRUBIN mg/dL  --   --   --  0.9 0.8  --  1.3*   GLOBULIN gm/dL  --   --   --  2.6 2.5  --  3.7   CRP mg/dL  --   --   --   --  <0.30  --   --    SED RATE mm/hr  --   --   --   --   --   --  12       Lab Results - Last 18 Months   Lab Units 03/24/21  1317 03/18/21  0336 03/17/21  0346 03/16/21  0944 03/16/21  0246 03/15/21  1855 03/15/21  1613   HEMOGLOBIN g/dL 10.2* 8.3* 8.5* 6.2* 5.5* 4.4* 4.6*   HEMATOCRIT % 34.7 27.9* 28.2* 21.6* 19.4* 16.5* 17.4*   MCV fL 73.2* 68.7* 68.4*  --  63.4* 60.4* 60.8*   WBC 10*3/mm3 5.55 6.01 4.77  --  4.91 6.47 6.65   RDW % 28.5* 25.4* 24.9*  --  21.9* 18.0* 18.0*   MPV fL 10.4 10.4 10.4  --  10.5 10.5 10.5   PLATELETS 10*3/mm3 193 352 385  --  426 463* 488*   INR   --   --   --   --   --  1.23*  --        Lab Results - Last 18 Months   Lab Units 03/15/21  2030 03/15/21  1855 04/23/20  0829   IRON mcg/dL  --  10*  --    TIBC mcg/dL  --  586*  --    IRON SATURATION %  --  2*  --    FERRITIN ng/mL  --  2.49*  --    TSH uIU/mL  --   --  1.630   FOLATE ng/mL 16.20  --   --         Lab Results - Last 18 Months   Lab Units 03/15/21  1855   FERRITIN ng/mL 2.49*       Body mass index is 36.64 kg/m². Patient's Body mass index is 36.64 kg/m². BMI is above normal parameters. Recommendations include: nutrition counseling.    Assessment and  Plan:  Assessment/Plan   Diagnoses and all orders for this visit:    1. Anemia, unspecified type (Primary)  -     CT Enterography Abdomen Pelvis w Contrast; Future  -     CBC & Differential; Future    2. History of GI bleed  -     CT Enterography Abdomen Pelvis w Contrast; Future    3. Rectal bleeding      I have discussed the patient's hospitalization with Dr. Mattson.  She instructed me to check a CTE of abdomen and pelvis and will most likely follow with him to a depending on findings of CTE.  She gave verbal understanding.  Will schedule CBC to be rechecked in 1 week as the patient tells me no one is following her for this.     There are no Patient Instructions on file for this visit.    Next follow-up appointment        EMR Dragon/Transcription disclaimer:  Much of this encounter note is an electronic transcription/translation of spoken language to printed text. The electronic translation of spoken language may permit erroneous, or at times, nonsensical words or phrases to be inadvertently transcribed; although I have reviewed the note for such errors, some may still exist.

## 2021-04-02 ENCOUNTER — OFFICE VISIT (OUTPATIENT)
Dept: GASTROENTEROLOGY | Facility: CLINIC | Age: 36
End: 2021-04-02

## 2021-04-02 VITALS
OXYGEN SATURATION: 99 % | SYSTOLIC BLOOD PRESSURE: 128 MMHG | WEIGHT: 227 LBS | DIASTOLIC BLOOD PRESSURE: 74 MMHG | BODY MASS INDEX: 36.48 KG/M2 | HEART RATE: 90 BPM | HEIGHT: 66 IN | TEMPERATURE: 97.3 F

## 2021-04-02 DIAGNOSIS — K62.5 RECTAL BLEEDING: ICD-10-CM

## 2021-04-02 DIAGNOSIS — Z87.19 HISTORY OF GI BLEED: ICD-10-CM

## 2021-04-02 DIAGNOSIS — D64.9 ANEMIA, UNSPECIFIED TYPE: Primary | ICD-10-CM

## 2021-04-02 PROCEDURE — 99214 OFFICE O/P EST MOD 30 MIN: CPT | Performed by: NURSE PRACTITIONER

## 2021-04-09 ENCOUNTER — LAB (OUTPATIENT)
Dept: LAB | Facility: HOSPITAL | Age: 36
End: 2021-04-09

## 2021-04-09 ENCOUNTER — HOSPITAL ENCOUNTER (OUTPATIENT)
Dept: CT IMAGING | Facility: HOSPITAL | Age: 36
Discharge: HOME | End: 2021-04-09

## 2021-04-09 DIAGNOSIS — D64.9 ANEMIA, UNSPECIFIED TYPE: ICD-10-CM

## 2021-04-09 DIAGNOSIS — Z87.19 HISTORY OF GI BLEED: ICD-10-CM

## 2021-04-09 LAB
BASOPHILS # BLD AUTO: 0.04 10*3/MM3 (ref 0–0.2)
BASOPHILS NFR BLD AUTO: 0.4 % (ref 0–1.5)
EOSINOPHIL # BLD AUTO: 0.14 10*3/MM3 (ref 0–0.4)
EOSINOPHIL NFR BLD AUTO: 1.6 % (ref 0.3–6.2)
ERYTHROCYTE [DISTWIDTH] IN BLOOD BY AUTOMATED COUNT: ABNORMAL %
HCT VFR BLD AUTO: 37.3 % (ref 34–46.6)
HGB BLD-MCNC: 11.2 G/DL (ref 12–15.9)
LYMPHOCYTES # BLD AUTO: 1.39 10*3/MM3 (ref 0.7–3.1)
LYMPHOCYTES NFR BLD AUTO: 15.4 % (ref 19.6–45.3)
MCH RBC QN AUTO: 23 PG (ref 26.6–33)
MCHC RBC AUTO-ENTMCNC: 30 G/DL (ref 31.5–35.7)
MCV RBC AUTO: 76.7 FL (ref 79–97)
MONOCYTES # BLD AUTO: 0.58 10*3/MM3 (ref 0.1–0.9)
MONOCYTES NFR BLD AUTO: 6.4 % (ref 5–12)
NEUTROPHILS NFR BLD AUTO: 6.84 10*3/MM3 (ref 1.7–7)
NEUTROPHILS NFR BLD AUTO: 76 % (ref 42.7–76)
PLATELET # BLD AUTO: 386 10*3/MM3 (ref 140–450)
PMV BLD AUTO: 9.8 FL (ref 6–12)
RBC # BLD AUTO: 4.86 10*6/MM3 (ref 3.77–5.28)
WBC # BLD AUTO: 9.01 10*3/MM3 (ref 3.4–10.8)

## 2021-04-09 PROCEDURE — 74177 CT ABD & PELVIS W/CONTRAST: CPT

## 2021-04-09 PROCEDURE — 0 IOPAMIDOL PER 1 ML: Performed by: NURSE PRACTITIONER

## 2021-04-09 PROCEDURE — 85025 COMPLETE CBC W/AUTO DIFF WBC: CPT

## 2021-04-09 PROCEDURE — 36415 COLL VENOUS BLD VENIPUNCTURE: CPT

## 2021-04-09 RX ADMIN — IOPAMIDOL 100 ML: 755 INJECTION, SOLUTION INTRAVENOUS at 14:25

## 2021-04-12 ENCOUNTER — TELEPHONE (OUTPATIENT)
Dept: GASTROENTEROLOGY | Facility: CLINIC | Age: 36
End: 2021-04-12

## 2021-04-12 NOTE — TELEPHONE ENCOUNTER
----- Message from MARIA INES Mendieta sent at 4/12/2021  8:27 AM CDT -----  Please notify the patient that her hemoglobin has increased to 11.2 which is better than 2 weeks ago which was 10.2.  I am having Dr. Mattson review her CTE and will let her know how we will proceed from there.

## 2021-04-12 NOTE — TELEPHONE ENCOUNTER
Called and spoke to pt re: results-she VU. Pt advised to call me back with any further questions/problems, otherwise she was advised I would call her once Gladis was able to speak to Dr. Mattson about CTE.

## 2021-04-13 DIAGNOSIS — D50.0 ANEMIA, BLOOD LOSS: Primary | ICD-10-CM

## 2021-04-13 NOTE — TELEPHONE ENCOUNTER
----- Message from MARIA INES Mendieta sent at 4/13/2021  8:48 AM CDT -----  Please notify the patient that CTE revealed no acute abnormalities.  It did continue to show proctitis from previous scan noted on colonoscopy as well.  I have stressed this with Dr. Mattson and she wants to proceed to an M2A capsule.  I have placed orders for this.

## 2021-04-15 ENCOUNTER — TRANSCRIBE ORDERS (OUTPATIENT)
Dept: LAB | Facility: HOSPITAL | Age: 36
End: 2021-04-15

## 2021-04-15 ENCOUNTER — LAB (OUTPATIENT)
Dept: LAB | Facility: HOSPITAL | Age: 36
End: 2021-04-15

## 2021-04-15 DIAGNOSIS — Z01.818 PREOPERATIVE TESTING: Primary | ICD-10-CM

## 2021-04-15 LAB — SARS-COV-2 RNA PNL SPEC NAA+PROBE: NOT DETECTED

## 2021-04-15 PROCEDURE — 87635 SARS-COV-2 COVID-19 AMP PRB: CPT | Performed by: INTERNAL MEDICINE

## 2021-04-15 PROCEDURE — C9803 HOPD COVID-19 SPEC COLLECT: HCPCS | Performed by: INTERNAL MEDICINE

## 2021-04-16 ENCOUNTER — HOSPITAL ENCOUNTER (OUTPATIENT)
Facility: HOSPITAL | Age: 36
Setting detail: HOSPITAL OUTPATIENT SURGERY
Discharge: HOME | End: 2021-04-16
Attending: INTERNAL MEDICINE | Admitting: INTERNAL MEDICINE

## 2021-04-16 PROCEDURE — 91110 GI TRC IMG INTRAL ESOPH-ILE: CPT | Performed by: INTERNAL MEDICINE

## 2021-04-16 PROCEDURE — C1889 IMPLANT/INSERT DEVICE, NOC: HCPCS | Performed by: INTERNAL MEDICINE

## 2021-04-20 ENCOUNTER — TELEPHONE (OUTPATIENT)
Dept: GASTROENTEROLOGY | Facility: CLINIC | Age: 36
End: 2021-04-20

## 2021-04-20 DIAGNOSIS — D50.8 OTHER IRON DEFICIENCY ANEMIA: Primary | ICD-10-CM

## 2021-04-20 PROBLEM — D50.9 IRON DEFICIENCY ANEMIA: Status: ACTIVE | Noted: 2021-03-16

## 2021-04-20 NOTE — TELEPHONE ENCOUNTER
Please let her know that her M2A capsule test was normal.  There was no blood seen in the lumen and no abnormalities noted.  Have her continue taking iron as she is currently doing.    We will recheck CBC ferritin and iron studies in 3 months.  This can be done in late July.  Follow-up with me in the office in 3-4 months    Octavia Mattson MD

## 2021-04-20 NOTE — TELEPHONE ENCOUNTER
"Called and spoke to pt re: results-she VU. I transferred her to [facility] to go ahead and schedule f/u with Dr. Mattson.     Dr. Mattson-pt is concerned that every test has been normal and now you want her to f/u in 3-4 months. She was asking if there any other tests she needed to have done since she is still having issues? She tells me she is still \"losing blood\" and having all the same problems as prior to her M2A. I told her I would message you back to see what you recommended and call her back. Thanks!   "

## 2021-04-21 NOTE — TELEPHONE ENCOUNTER
There are many reasons for anemia other than blood loss so she needs to follow up with her primary doc to explore those.      I have no new GI tests to offer.   If she is having any rectal bleeding, then I would suggest to repeat the colonoscopy.  I did review the one that was done in the hospital.  It was normal and bxs from the rectum were normal, but the CT showed thickening.      Octavia Mattson MD

## 2021-04-21 NOTE — TELEPHONE ENCOUNTER
"Called and spoke to pt re: Dr. Mattson's recommendations. She tells me she \"has good days and bad days-sometimes I see blood in the toilet for several days and then I won't see any for a while.\" She is going to f/u with her PCP-she was advised to call me back if she wanted to repeat the colonoscopy especially if bleeding persists. Pt has f/u with Dr. Mattson scheduled 8/2021 and was advised to have labs done at the end of July-she VU. Pt also advised to call me back with any further questions/problems.    "

## 2021-04-30 ENCOUNTER — PREP FOR SURGERY (OUTPATIENT)
Dept: OTHER | Facility: HOSPITAL | Age: 36
End: 2021-04-30

## 2021-04-30 DIAGNOSIS — K62.5 RECTAL BLEEDING: Primary | ICD-10-CM

## 2021-04-30 RX ORDER — SODIUM, POTASSIUM,MAG SULFATES 17.5-3.13G
2 SOLUTION, RECONSTITUTED, ORAL ORAL TAKE AS DIRECTED
Qty: 2 ML | Refills: 0 | Status: SHIPPED | OUTPATIENT
Start: 2021-04-30 | End: 2021-05-19

## 2021-04-30 NOTE — TELEPHONE ENCOUNTER
Pt called and states that her bleeding is still persistent and she would like to get scheduled for colonoscopy. I have pended case request and prep to you to sign.

## 2021-05-11 ENCOUNTER — TELEPHONE (OUTPATIENT)
Dept: GASTROENTEROLOGY | Facility: CLINIC | Age: 36
End: 2021-05-11

## 2021-05-11 NOTE — TELEPHONE ENCOUNTER
Pt called and canceled colonoscopy for 5/21/21 because her PCP had referred her to GI in [place].

## 2021-05-19 ENCOUNTER — CONSULT (OUTPATIENT)
Dept: ONCOLOGY | Facility: CLINIC | Age: 36
End: 2021-05-19

## 2021-05-19 ENCOUNTER — LAB (OUTPATIENT)
Dept: LAB | Facility: HOSPITAL | Age: 36
End: 2021-05-19

## 2021-05-19 VITALS
DIASTOLIC BLOOD PRESSURE: 78 MMHG | HEIGHT: 66 IN | TEMPERATURE: 98.4 F | SYSTOLIC BLOOD PRESSURE: 138 MMHG | WEIGHT: 224.9 LBS | RESPIRATION RATE: 16 BRPM | HEART RATE: 80 BPM | BODY MASS INDEX: 36.14 KG/M2 | OXYGEN SATURATION: 99 %

## 2021-05-19 DIAGNOSIS — D50.9 IRON DEFICIENCY ANEMIA, UNSPECIFIED IRON DEFICIENCY ANEMIA TYPE: ICD-10-CM

## 2021-05-19 DIAGNOSIS — K62.89 RECTAL MASS: ICD-10-CM

## 2021-05-19 DIAGNOSIS — D72.810 LYMPHOPENIA: ICD-10-CM

## 2021-05-19 DIAGNOSIS — D47.1 MYELOPROLIFERATIVE DISEASE (HCC): ICD-10-CM

## 2021-05-19 DIAGNOSIS — R93.89 ABNORMAL CT SCAN: ICD-10-CM

## 2021-05-19 DIAGNOSIS — D50.0 ANEMIA, BLOOD LOSS: ICD-10-CM

## 2021-05-19 DIAGNOSIS — D64.9 ANEMIA, UNSPECIFIED TYPE: ICD-10-CM

## 2021-05-19 DIAGNOSIS — D72.819 LEUKOPENIA, UNSPECIFIED TYPE: ICD-10-CM

## 2021-05-19 DIAGNOSIS — D72.810 LYMPHOPENIA: Primary | ICD-10-CM

## 2021-05-19 LAB
ALBUMIN SERPL-MCNC: 4.3 G/DL (ref 3.5–5.2)
ALBUMIN/GLOB SERPL: 1.3 G/DL
ALP SERPL-CCNC: 60 U/L (ref 39–117)
ALT SERPL W P-5'-P-CCNC: 10 U/L (ref 1–33)
ANION GAP SERPL CALCULATED.3IONS-SCNC: 11 MMOL/L (ref 5–15)
AST SERPL-CCNC: 18 U/L (ref 1–32)
BASOPHILS # BLD AUTO: 0.02 10*3/MM3 (ref 0–0.2)
BASOPHILS NFR BLD AUTO: 0.3 % (ref 0–1.5)
BILIRUB SERPL-MCNC: 0.9 MG/DL (ref 0–1.2)
BUN SERPL-MCNC: 12 MG/DL (ref 6–20)
BUN/CREAT SERPL: 20 (ref 7–25)
CALCIUM SPEC-SCNC: 9.2 MG/DL (ref 8.6–10.5)
CEA SERPL-MCNC: 1.05 NG/ML
CHLORIDE SERPL-SCNC: 104 MMOL/L (ref 98–107)
CO2 SERPL-SCNC: 24 MMOL/L (ref 22–29)
CREAT SERPL-MCNC: 0.6 MG/DL (ref 0.57–1)
DEPRECATED RDW RBC AUTO: 59.1 FL (ref 37–54)
EOSINOPHIL # BLD AUTO: 0.02 10*3/MM3 (ref 0–0.4)
EOSINOPHIL NFR BLD AUTO: 0.3 % (ref 0.3–6.2)
ERYTHROCYTE [DISTWIDTH] IN BLOOD BY AUTOMATED COUNT: 19.1 % (ref 12.3–15.4)
ERYTHROCYTE [SEDIMENTATION RATE] IN BLOOD: 20 MM/HR (ref 0–20)
FERRITIN SERPL-MCNC: 14.82 NG/ML (ref 13–150)
FOLATE SERPL-MCNC: 16 NG/ML (ref 4.78–24.2)
GFR SERPL CREATININE-BSD FRML MDRD: 136 ML/MIN/1.73
GLOBULIN UR ELPH-MCNC: 3.2 GM/DL
GLUCOSE SERPL-MCNC: 86 MG/DL (ref 65–99)
HAPTOGLOB SERPL-MCNC: 191 MG/DL (ref 30–200)
HCT VFR BLD AUTO: 35.3 % (ref 34–46.6)
HGB BLD-MCNC: 11.1 G/DL (ref 12–15.9)
IRON 24H UR-MRATE: 112 MCG/DL (ref 37–145)
IRON SATN MFR SERPL: 21 % (ref 20–50)
LYMPHOCYTES # BLD AUTO: 1.2 10*3/MM3 (ref 0.7–3.1)
LYMPHOCYTES NFR BLD AUTO: 20 % (ref 19.6–45.3)
MCH RBC QN AUTO: 25.5 PG (ref 26.6–33)
MCHC RBC AUTO-ENTMCNC: 31.4 G/DL (ref 31.5–35.7)
MCV RBC AUTO: 81.1 FL (ref 79–97)
MONOCYTES # BLD AUTO: 0.56 10*3/MM3 (ref 0.1–0.9)
MONOCYTES NFR BLD AUTO: 9.3 % (ref 5–12)
NEUTROPHILS NFR BLD AUTO: 4.17 10*3/MM3 (ref 1.7–7)
NEUTROPHILS NFR BLD AUTO: 69.8 % (ref 42.7–76)
PLATELET # BLD AUTO: 250 10*3/MM3 (ref 140–450)
PMV BLD AUTO: 10.4 FL (ref 6–12)
POTASSIUM SERPL-SCNC: 4.2 MMOL/L (ref 3.5–5.2)
PROT SERPL-MCNC: 7.5 G/DL (ref 6–8.5)
RBC # BLD AUTO: 4.35 10*6/MM3 (ref 3.77–5.28)
SODIUM SERPL-SCNC: 139 MMOL/L (ref 136–145)
TIBC SERPL-MCNC: 533 MCG/DL (ref 298–536)
TRANSFERRIN SERPL-MCNC: 358 MG/DL (ref 200–360)
VIT B12 BLD-MCNC: 568 PG/ML (ref 211–946)
WBC # BLD AUTO: 5.99 10*3/MM3 (ref 3.4–10.8)

## 2021-05-19 PROCEDURE — 99244 OFF/OP CNSLTJ NEW/EST MOD 40: CPT | Performed by: INTERNAL MEDICINE

## 2021-05-19 PROCEDURE — 83540 ASSAY OF IRON: CPT

## 2021-05-19 PROCEDURE — 85651 RBC SED RATE NONAUTOMATED: CPT

## 2021-05-19 PROCEDURE — 36415 COLL VENOUS BLD VENIPUNCTURE: CPT

## 2021-05-19 PROCEDURE — 82378 CARCINOEMBRYONIC ANTIGEN: CPT

## 2021-05-19 PROCEDURE — 82728 ASSAY OF FERRITIN: CPT

## 2021-05-19 PROCEDURE — 83010 ASSAY OF HAPTOGLOBIN QUANT: CPT

## 2021-05-19 PROCEDURE — 82607 VITAMIN B-12: CPT

## 2021-05-19 PROCEDURE — 85060 BLOOD SMEAR INTERPRETATION: CPT

## 2021-05-19 PROCEDURE — 82746 ASSAY OF FOLIC ACID SERUM: CPT

## 2021-05-19 PROCEDURE — 86300 IMMUNOASSAY TUMOR CA 15-3: CPT

## 2021-05-19 PROCEDURE — 84466 ASSAY OF TRANSFERRIN: CPT

## 2021-05-19 PROCEDURE — 80053 COMPREHEN METABOLIC PANEL: CPT

## 2021-05-19 PROCEDURE — 85025 COMPLETE CBC W/AUTO DIFF WBC: CPT

## 2021-05-19 RX ORDER — FAMOTIDINE 20 MG/1
10 TABLET, FILM COATED ORAL 2 TIMES DAILY
Qty: 28 TABLET | Refills: 2 | Status: SHIPPED | OUTPATIENT
Start: 2021-05-19 | End: 2021-05-27 | Stop reason: ALTCHOICE

## 2021-05-19 RX ORDER — METOCLOPRAMIDE 5 MG/1
5 TABLET ORAL
Qty: 56 TABLET | Refills: 1 | Status: SHIPPED | OUTPATIENT
Start: 2021-05-19 | End: 2021-08-02

## 2021-05-20 LAB
CANCER AG27-29 SERPL-ACNC: 31 U/ML (ref 0–38.6)
CYTOLOGIST CVX/VAG CYTO: NORMAL
PATH INTERP BLD-IMP: NORMAL

## 2021-05-25 ENCOUNTER — TELEPHONE (OUTPATIENT)
Dept: ONCOLOGY | Facility: CLINIC | Age: 36
End: 2021-05-25

## 2021-05-25 NOTE — TELEPHONE ENCOUNTER
Caller:  EMERALD     Relationship: SELF     Best call back number:[phone]    PATIENT WAS RETURNING A CALL. I TOLD HER ABOUT THE MRI  SCHEDULED ON 6/11. I ONLY GAVE HER THE TIME SCHEDULED BECAUSE THAT IS ALL THAT I SEE, NO INFO ON REFERRAL TO TELL.   SHE WAS ALSO ASKING ABOUT HER BONE SCAN.   PLEASE CALL AND ADVISE   THANK YOU

## 2021-05-27 ENCOUNTER — LAB (OUTPATIENT)
Dept: LAB | Facility: HOSPITAL | Age: 36
End: 2021-05-27

## 2021-05-27 ENCOUNTER — OFFICE VISIT (OUTPATIENT)
Dept: ONCOLOGY | Facility: CLINIC | Age: 36
End: 2021-05-27

## 2021-05-27 VITALS
WEIGHT: 222 LBS | SYSTOLIC BLOOD PRESSURE: 110 MMHG | DIASTOLIC BLOOD PRESSURE: 80 MMHG | OXYGEN SATURATION: 99 % | RESPIRATION RATE: 16 BRPM | HEART RATE: 88 BPM | HEIGHT: 66 IN | BODY MASS INDEX: 35.68 KG/M2 | TEMPERATURE: 98 F

## 2021-05-27 DIAGNOSIS — D64.9 ANEMIA, UNSPECIFIED TYPE: ICD-10-CM

## 2021-05-27 DIAGNOSIS — D72.819 LEUKOPENIA, UNSPECIFIED TYPE: Primary | ICD-10-CM

## 2021-05-27 DIAGNOSIS — K62.5 RECTAL BLEEDING: ICD-10-CM

## 2021-05-27 DIAGNOSIS — R10.13 EPIGASTRIC PAIN: ICD-10-CM

## 2021-05-27 DIAGNOSIS — R10.30 LOWER ABDOMINAL PAIN: ICD-10-CM

## 2021-05-27 DIAGNOSIS — D50.0 ANEMIA, BLOOD LOSS: ICD-10-CM

## 2021-05-27 DIAGNOSIS — K21.00 GASTROESOPHAGEAL REFLUX DISEASE WITH ESOPHAGITIS: ICD-10-CM

## 2021-05-27 PROBLEM — K92.1 BLOOD IN THE STOOL: Status: ACTIVE | Noted: 2021-05-11

## 2021-05-27 LAB
FERRITIN SERPL-MCNC: 17.6 NG/ML (ref 13–150)
IRON 24H UR-MRATE: 37 MCG/DL (ref 37–145)
IRON SATN MFR SERPL: 8 % (ref 20–50)
REF LAB TEST METHOD: NORMAL
REF LAB TEST METHOD: NORMAL
TIBC SERPL-MCNC: 483 MCG/DL (ref 298–536)
TRANSFERRIN SERPL-MCNC: 324 MG/DL (ref 200–360)

## 2021-05-27 PROCEDURE — 99214 OFFICE O/P EST MOD 30 MIN: CPT | Performed by: INTERNAL MEDICINE

## 2021-05-27 PROCEDURE — 84466 ASSAY OF TRANSFERRIN: CPT

## 2021-05-27 PROCEDURE — 83540 ASSAY OF IRON: CPT

## 2021-05-27 PROCEDURE — 36415 COLL VENOUS BLD VENIPUNCTURE: CPT

## 2021-05-27 PROCEDURE — 82728 ASSAY OF FERRITIN: CPT

## 2021-05-27 RX ORDER — PANTOPRAZOLE SODIUM 40 MG/1
TABLET, DELAYED RELEASE ORAL
Qty: 90 TABLET | Refills: 1 | Status: SHIPPED | OUTPATIENT
Start: 2021-05-27 | End: 2023-01-03

## 2021-05-28 NOTE — PROGRESS NOTES
[facility]  [address]  [facility] [address] [phone]    Chief Complaint  Anemia (Follow-up)    Subjective            She is here as a FU for abnormal CBC and CT scan findings of rectal thickening.    Oncology/Hematology History    No history exists.         PAST MEDICAL HISTORY:  ALLERGIES:  No Known Allergies  CURRENT MEDICATIONS:  Outpatient Encounter Medications as of 5/27/2021   Medication Sig Dispense Refill   • ferrous sulfate (FerrouSul) 325 (65 FE) MG tablet Take 1 tablet by mouth Daily With Breakfast. (Patient taking differently: Take 325 mg by mouth Every 72 (Seventy-Two) Hours.)     • metoclopramide (REGLAN) 5 MG tablet Take 1 tablet by mouth 4 (Four) Times a Day Before Meals & at Bedtime. 56 tablet 1   • ondansetron ODT (ZOFRAN-ODT) 8 MG disintegrating tablet Place 1 tablet on the tongue Every 8 (Eight) Hours As Needed for Nausea or Vomiting. 15 tablet 0   • [DISCONTINUED] pantoprazole (PROTONIX) 40 MG EC tablet Take 1 tablet by mouth 2 (two) times a day. 60 tablet 2   • [DISCONTINUED] famotidine (PEPCID) 20 MG tablet Take 0.5 tablets by mouth 2 (Two) Times a Day. 28 tablet 2     No facility-administered encounter medications on file as of 5/27/2021.     ADULT ILLNESSES:  Patient Active Problem List   Diagnosis Code   • Other constipation K59.09   • Rectal bleeding K62.5   • Other hemorrhoids K64.8   • Epigastric pain R10.13   • Gastroesophageal reflux disease with esophagitis K21.00   • Nausea R11.0   • Gastrointestinal hemorrhage K92.2   • Anemia D64.9   • Iron deficiency anemia D50.9   • Abnormal CT scan abdomen R93.89   • Anemia, blood loss D50.0   • Blood in the stool K92.1   • Lower abdominal pain R10.30   • Leukopenia D72.819     SURGERIES:  Past Surgical History:   Procedure Laterality Date   • CAPSULE ENDOSCOPY N/A 4/16/2021    Procedure: CAPSULE ENDOSCOPY M2A;  Surgeon: Octavia Mattson MD;   Location: [facility] ENDOSCOPY;  Service: Gastroenterology;  Laterality: N/A; Normal exam.  No blood in lumen.  Capsule seen entering the colon.   • COLONOSCOPY N/A 5/22/2019    Non-bleeding internal hemorrhoids; The examination was otherwise normal; No specimens collected; Repeat colonoscopy at age 48 for screening purposes   • COLONOSCOPY N/A 3/18/2021    Dr. Hernandez-The examined portion of the ileum was normal; Erythematous mucosa in the rectum-biopsied; Non-bleeding internal hemorrhoids   • ENDOSCOPY N/A 6/17/2020    LA Grade A reflux esophagitis; Normal stomach-biopsied; Normal examined duodenum   • ENDOSCOPY N/A 3/18/2021    Dr. Hernandez-Z-line irregular-biopsied; Normal stomach-biopsied; Normal first portion of the duodenum and second portion of the duodenum   • WISDOM TOOTH EXTRACTION       HEALTH MAINTENANCE ITEMS:  Health Maintenance Due   Topic Date Due   • ANNUAL PHYSICAL  Never done   • HEPATITIS C SCREENING  Never done   • PAP SMEAR  Never done   • COVID-19 Vaccine (2 - Moderna 2-dose series) 04/03/2021       <no information>  Last Completed Colonoscopy     This patient has no relevant Health Maintenance data.          There is no immunization history on file for this patient.  Last Completed Mammogram     This patient has no relevant Health Maintenance data.            FAMILY HISTORY:  Family History   Problem Relation Age of Onset   • Heart defect Mother    • No Known Problems Father    • Hypertension Maternal Grandmother    • No Known Problems Maternal Grandfather    • No Known Problems Paternal Grandmother    • Prostate cancer Paternal Grandfather    • Pancreatic cancer Paternal Grandfather    • Colon cancer Neg Hx    • Colon polyps Neg Hx    • Esophageal cancer Neg Hx    • Liver cancer Neg Hx    • Stomach cancer Neg Hx    • Rectal cancer Neg Hx    • Liver disease Neg Hx      SOCIAL HISTORY:  Social History     Socioeconomic History   • Marital status:      Spouse name: Not on file   • Number  "of children: Not on file   • Years of education: Not on file   • Highest education level: Not on file   Tobacco Use   • Smoking status: Former Smoker     Types: Cigarettes   • Smokeless tobacco: Never Used   Vaping Use   • Vaping Use: Never used   Substance and Sexual Activity   • Alcohol use: Yes     Comment: Occasionally   • Drug use: No   • Sexual activity: Defer       REVIEW OF SYSTEMS:  Review of Systems   Constitutional: Positive for activity change and fatigue.   HENT: Negative.    Eyes: Negative.    Respiratory: Negative.    Cardiovascular: Negative.    Gastrointestinal: Positive for abdominal pain.   Genitourinary: Negative.    Musculoskeletal: Positive for back pain.   Allergic/Immunologic: Negative.    Neurological: Positive for weakness.   Hematological: Negative.    Psychiatric/Behavioral: Positive for stress.       /80   Pulse 88   Temp 98 °F (36.7 °C) (Infrared)   Resp 16   Ht 167.6 cm (66\")   Wt 101 kg (222 lb)   SpO2 99%   BMI 35.83 kg/m²  Body surface area is 2.09 meters squared.  There were no vitals filed for this visit.    Objective   Vital Signs:   /80   Pulse 88   Temp 98 °F (36.7 °C) (Infrared)   Resp 16   Ht 167.6 cm (66\")   Wt 101 kg (222 lb)   SpO2 99%   BMI 35.83 kg/m²  Body surface area is 2.09 meters squared.  There were no vitals filed for this visit.  Ratna Flor reports a pain score of .  Given her pain assessment as noted, treatment options were discussed and the following options were decided upon as a follow-up plan to address the patient's pain: .      Patient's Body mass index is 35.83 kg/m². indicating that she is .      Physical Exam  Constitutional:       Appearance: Normal appearance. She is obese.   HENT:      Head: Atraumatic.      Mouth/Throat:      Mouth: Mucous membranes are dry.   Cardiovascular:      Rate and Rhythm: Normal rate.   Pulmonary:      Effort: Pulmonary effort is normal.   Abdominal:      Palpations: Abdomen is soft. "   Musculoskeletal:         General: Normal range of motion.      Cervical back: Normal range of motion.   Skin:     General: Skin is warm.   Neurological:      General: No focal deficit present.      Mental Status: She is oriented to person, place, and time.            Result Review :     LABS    Lab Results - Last 18 Months   Lab Units 05/19/21  1310 04/09/21  1121 03/24/21  1317 03/18/21  0336 03/17/21  0346 03/16/21  0944 03/16/21  0246 03/15/21  1855 03/15/21  1613 04/08/20  1448 04/08/20  1448   HEMOGLOBIN g/dL 11.1* 11.2* 10.2* 8.3* 8.5* 6.2* 5.5* 4.4* 4.6*  --  14.1   HEMATOCRIT % 35.3 37.3 34.7 27.9* 28.2* 21.6* 19.4* 16.5* 17.4*  --  41.4   MCV fL 81.1 76.7* 73.2* 68.7* 68.4*  --  63.4* 60.4* 60.8*  --  85.4   WBC 10*3/mm3 5.99 9.01 5.55 6.01 4.77  --  4.91 6.47 6.65  --  7.00   RDW % 19.1*  --  28.5* 25.4* 24.9*  --  21.9* 18.0* 18.0*  --  12.2*   MPV fL 10.4 9.8 10.4 10.4 10.4  --  10.5 10.5 10.5  --  9.3   PLATELETS 10*3/mm3 250 386 193 352 385  --  426 463* 488*  --  272   NEUTROS ABS 10*3/mm3 4.17 6.84  --  4.12 2.89  --  2.31 4.92 5.00   < > 4.70   LYMPHS ABS 10*3/mm3 1.20 1.39  --  0.90  --   --  1.87  --   --   --  1.50   MONOS ABS 10*3/mm3 0.56 0.58  --  0.75  --   --  0.59  --   --   --   --    EOS ABS 10*3/mm3 0.02 0.14  --  0.16 0.14  --  0.08 0.06  --   --   --    BASOS ABS 10*3/mm3 0.02 0.04  --  0.06 0.05  --  0.05 0.13  --   --   --    NRBC /100 WBC  --   --   --   --  2.0*  --   --   --   --   --   --    NEUTROPHIL % %  --   --   --   --  60.6  --   --  76.0 75.2  --   --    MONOCYTES % %  --   --   --   --  6.1  --   --  3.0* 6.9  --   --    BASOPHIL % %  --   --   --   --  1.0  --   --  2.0*  --   --   --    ATYP LYMPH % %  --   --   --   --  1.0  --   --   --   --   --   --    ANISOCYTOSIS   --   --   --   --  Slight/1+  --   --  Large/3+ Large/3+  --   --    GIANT PLT   --   --   --   --  Slight/1+  --   --  Slight/1+ Slight/1+  --   --     < > = values in this interval not displayed.        Lab Results - Last 18 Months   Lab Units 05/19/21  1310 03/18/21  0336 03/17/21  0346 03/16/21  0246 03/15/21  1855 03/15/21  1613 04/08/20  1448   GLUCOSE mg/dL 86 96 89 94 105* 94 92   SODIUM mmol/L 139 145 140 141 140 138 144   POTASSIUM mmol/L 4.2 3.8 3.7 4.0 4.1 4.2 4.0   CO2 mmol/L 24.0 23.0 25.0 24.0 24.0 23.0 26.0   CHLORIDE mmol/L 104 111* 104 107 106 105 105   ANION GAP mmol/L 11.0 11.0 11.0 10.0 10.0 10.0 13.0   CREATININE mg/dL 0.60 0.57 0.59 0.61 0.55* 0.55* 0.59   BUN mg/dL 12 8 9 9 9 10 12   BUN / CREAT RATIO  20.0 14.0 15.3 14.8 16.4 18.2 20.3   CALCIUM mg/dL 9.2 8.7 8.9 9.1 9.2 9.5 9.6   EGFR IF NONAFRICN AM mL/min/1.73  --  119 115 110 124 124 115   ALK PHOS U/L 60  --   --   --  46 48 59   TOTAL PROTEIN g/dL 7.5  --   --   --  6.9 6.9 8.2   ALT (SGPT) U/L 10  --   --   --  10 9 13   AST (SGOT) U/L 18  --   --   --  18 16 19   BILIRUBIN mg/dL 0.9  --   --   --  0.9 0.8 1.3*   ALBUMIN g/dL 4.30  --   --   --  4.30 4.40 4.50   GLOBULIN gm/dL 3.2  --   --   --  2.6 2.5 3.7       Lab Results - Last 18 Months   Lab Units 05/19/21  1310   CEA ng/mL 1.05   REFERENCE LAB REPORT  See Attached Report  See Attached Report       Lab Results - Last 18 Months   Lab Units 05/27/21  1006 05/19/21  1310 03/15/21  2030 03/15/21  1855 04/23/20  0829   IRON mcg/dL 37 112  --  10*  --    TIBC mcg/dL 483 533  --  586*  --    IRON SATURATION % 8* 21  --  2*  --    FERRITIN ng/mL 17.60 14.82  --  2.49*  --    TSH uIU/mL  --   --   --   --  1.630   FOLATE ng/mL  --  16.00 16.20  --   --               Assessment and Plan    Problem List Items Addressed This Visit        Other    Rectal bleeding    Overview     Added automatically from request for surgery [id number]         Current Assessment & Plan     1. She may have rectal bleeding  2. Her ferritin Fe profiles are WNL  3. Her MRI is pending  4. Concern is rectal ca  5. Tummor marker is WNL         Epigastric pain    Overview     Added automatically from request for  surgery [id number]         Current Assessment & Plan     1. This is improved with reglan         Anemia - Primary    Current Assessment & Plan     1. She does have a components of anemia from BM suppression  2. We have discussed about potential etiology and expected BM BX findings         Relevant Orders    Ferritin (Completed)    Iron Profile (Completed)    Anemia, blood loss    Overview     Added automatically from request for surgery [id number]         Current Assessment & Plan     1. Her ferritin is bottom WNL  2. We will repeat this in return visit         Lower abdominal pain    Leukopenia    Current Assessment & Plan     1. She does have frequent lymphopenia  2. Her BM needs to be examined  3. It is unclear if this is a result of the MPD or BM suppression             In summary  1. Her tumor marker is WNL  2. She will be back with a FU anemia profile    Follow Up     Patient was given instructions and counseling regarding her condition or for health maintenance advice. Please see specific information pulled into the AVS if appropriate.

## 2021-05-28 NOTE — ASSESSMENT & PLAN NOTE
1. She may have rectal bleeding  2. Her ferritin Fe profiles are WNL  3. Her MRI is pending  4. Concern is rectal ca  5. Tummor marker is WNL

## 2021-05-28 NOTE — ASSESSMENT & PLAN NOTE
1. She does have a components of anemia from BM suppression  2. We have discussed about potential etiology and expected BM BX findings

## 2021-05-28 NOTE — ASSESSMENT & PLAN NOTE
1. She does have frequent lymphopenia  2. Her BM needs to be examined  3. It is unclear if this is a result of the MPD or BM suppression

## 2021-06-02 ENCOUNTER — TELEPHONE (OUTPATIENT)
Dept: ONCOLOGY | Facility: CLINIC | Age: 36
End: 2021-06-02

## 2021-06-02 NOTE — TELEPHONE ENCOUNTER
Caller: PT    Relationship: PT    Best call back number: [phone]    What is the best time to reach you:     Who are you requesting to speak with (clinical staff, provider,  specific staff member): CLINICAL    Do you know the name of the person who called:     What was the call regarding: PT STATES SHE WAS TO BE SCHED FOR BIOPSY BUT HAS NOT HEARD BACK REGARDING SCHEDULING.     Do you require a callback: YES

## 2021-06-02 NOTE — TELEPHONE ENCOUNTER
MESSAGE SENT TO DR. STREET TO SEE IF HE WANTS PT TO HAVE BX DONE. NO ORDER WAS PUT IN FOR HER NOR MENTIONED IN HIS NOTE.

## 2021-06-03 NOTE — PROGRESS NOTES
[facility] HEMATOLOGY AND ONCOLOGY  [address]  [facility] [address] [phone]    Chief Complaint  Anemia (initial consult) and Outpatient Infusion    She is here as a FU for abnormal CBC and CT scan findings of rectal thickening. She has a long HX of intermittent rectal bleeding and several colonoscope only showed hemorrhoid. She also has been noticed to have Fe deficient anemia for which she took fe tablets and di not tolerate it while prior CBC did show lymphopenia. Now she is here for a new consult.       Ref Range & Units 2 wk ago 1 mo ago   WBC   3.40 - 10.80 10*3/mm3 5.99  9.01    RBC   3.77 - 5.28 10*6/mm3 4.35  4.86    Hemoglobin   12.0 - 15.9 g/dL 11.1Low   11.2Low     Hematocrit   34.0 - 46.6 % 35.3  37.3    MCV   79.0 - 97.0 fL 81.1  76.7Low     MCH   26.6 - 33.0 pg 25.5Low   23.0Low     MCHC   31.5 - 35.7 g/dL 31.4Low   30.0Low     RDW   12.3 - 15.4 % 19.1High    R, CM    RDW-SD   37.0 - 54.0 fl 59.1High      MPV   6.0 - 12.0 fL 10.4  9.8    Platelets   140 - 450 10*3/mm3 250  386    Neutrophil %   42.7 - 76.0 % 69.8  76.0    Lymphocyte %   19.6 - 45.3 % 20.0  15.4Low     Monocyte %   5.0 - 12.0 % 9.3  6.4    Eosinophil %   0.3 - 6.2 % 0.3  1.6    Basophil %   0.0 - 1.5 % 0.3  0.4    Neutrophils, Absolute   1.70 - 7.00 10*3/mm3 4.17  6.84    Lymphocytes, Absolute   0.70 - 3.10 10*3/mm3 1.20  1.39    Monocytes, Absolute   0.10 - 0.90 10*3/mm3 0.56  0.58    Eosinophils, Absolute   0.00 - 0.40 10*3/mm3 0.02  0.14    Basophils, Absolute   0.00 - 0.20 10*3/mm3 0.02  0.04          Oncology/Hematology History    No history exists.         PAST MEDICAL HISTORY:  ALLERGIES:  No Known Allergies  CURRENT MEDICATIONS:  Outpatient Encounter Medications as of 5/19/2021   Medication Sig Dispense Refill   • ferrous sulfate (FerrouSul) 325 (65 FE) MG tablet Take 1 tablet by mouth Daily With Breakfast. (Patient taking differently: Take 325 mg by  mouth Every 72 (Seventy-Two) Hours.)     • ondansetron ODT (ZOFRAN-ODT) 8 MG disintegrating tablet Place 1 tablet on the tongue Every 8 (Eight) Hours As Needed for Nausea or Vomiting. 15 tablet 0   • [DISCONTINUED] pantoprazole (PROTONIX) 40 MG EC tablet Take 1 tablet by mouth 2 (two) times a day. 60 tablet 2   • metoclopramide (REGLAN) 5 MG tablet Take 1 tablet by mouth 4 (Four) Times a Day Before Meals & at Bedtime. 56 tablet 1   • [DISCONTINUED] famotidine (PEPCID) 20 MG tablet Take 0.5 tablets by mouth 2 (Two) Times a Day. 28 tablet 2   • [DISCONTINUED] polyethylene glycol (GoLYTELY) 236 g solution Starting at noon on day prior to procedure, drink 8 ounces every 30 minutes until all gone or stools are clear. May add flavor packet. 4000 mL 0   • [DISCONTINUED] sodium-potassium-magnesium sulfates (Suprep Bowel Prep Kit) 17.5-3.13-1.6 GM/177ML solution oral solution Take 2 bottles by mouth Take As Directed. 2 mL 0     No facility-administered encounter medications on file as of 5/19/2021.     ADULT ILLNESSES:  Patient Active Problem List   Diagnosis Code   • Other constipation K59.09   • Rectal bleeding K62.5   • Other hemorrhoids K64.8   • Epigastric pain R10.13   • Gastroesophageal reflux disease with esophagitis K21.00   • Nausea R11.0   • Gastrointestinal hemorrhage K92.2   • Anemia D64.9   • Iron deficiency anemia D50.9   • Abnormal CT scan abdomen R93.89   • Anemia, blood loss D50.0   • Blood in the stool K92.1   • Lower abdominal pain R10.30   • Leukopenia D72.819     SURGERIES:  Past Surgical History:   Procedure Laterality Date   • CAPSULE ENDOSCOPY N/A 4/16/2021    Procedure: CAPSULE ENDOSCOPY M2A;  Surgeon: Octavia Mattson MD;  Location: [facility] ENDOSCOPY;  Service: Gastroenterology;  Laterality: N/A; Normal exam.  No blood in lumen.  Capsule seen entering the colon.   • COLONOSCOPY N/A 5/22/2019    Non-bleeding internal hemorrhoids; The examination was otherwise normal; No specimens collected; Repeat  colonoscopy at age 48 for screening purposes   • COLONOSCOPY N/A 3/18/2021    Dr. Hernandez-The examined portion of the ileum was normal; Erythematous mucosa in the rectum-biopsied; Non-bleeding internal hemorrhoids   • ENDOSCOPY N/A 6/17/2020    LA Grade A reflux esophagitis; Normal stomach-biopsied; Normal examined duodenum   • ENDOSCOPY N/A 3/18/2021    Dr. Hernandez-Z-line irregular-biopsied; Normal stomach-biopsied; Normal first portion of the duodenum and second portion of the duodenum   • WISDOM TOOTH EXTRACTION       HEALTH MAINTENANCE ITEMS:  Health Maintenance Due   Topic Date Due   • ANNUAL PHYSICAL  Never done   • HEPATITIS C SCREENING  Never done   • PAP SMEAR  Never done   • COVID-19 Vaccine (2 - Moderna 2-dose series) 04/03/2021       <no information>  Last Completed Colonoscopy     This patient has no relevant Health Maintenance data.          There is no immunization history on file for this patient.  Last Completed Mammogram     This patient has no relevant Health Maintenance data.            FAMILY HISTORY:  Family History   Problem Relation Age of Onset   • Heart defect Mother    • No Known Problems Father    • Hypertension Maternal Grandmother    • No Known Problems Maternal Grandfather    • No Known Problems Paternal Grandmother    • Prostate cancer Paternal Grandfather    • Pancreatic cancer Paternal Grandfather    • Colon cancer Neg Hx    • Colon polyps Neg Hx    • Esophageal cancer Neg Hx    • Liver cancer Neg Hx    • Stomach cancer Neg Hx    • Rectal cancer Neg Hx    • Liver disease Neg Hx      SOCIAL HISTORY:  Social History     Socioeconomic History   • Marital status:      Spouse name: Not on file   • Number of children: Not on file   • Years of education: Not on file   • Highest education level: Not on file   Tobacco Use   • Smoking status: Former Smoker     Types: Cigarettes   • Smokeless tobacco: Never Used   Vaping Use   • Vaping Use: Never used   Substance and Sexual Activity  "  • Alcohol use: Yes     Comment: Occasionally   • Drug use: No   • Sexual activity: Defer       REVIEW OF SYSTEMS:  Review of Systems   Constitutional: Positive for fatigue.   HENT: Negative.    Eyes: Negative.    Respiratory: Negative.    Cardiovascular: Negative.    Gastrointestinal: Negative.    Endocrine: Negative.    Genitourinary: Negative.    Musculoskeletal: Positive for arthralgias.   Skin: Positive for dry skin.   Allergic/Immunologic: Negative.    Neurological: Negative.    Hematological: Negative.    Psychiatric/Behavioral: Negative.  Positive for stress.       /78   Pulse 80   Temp 98.4 °F (36.9 °C) (Temporal)   Resp 16   Ht 167.6 cm (66\")   Wt 102 kg (224 lb 14.4 oz)   SpO2 99%   Breastfeeding No   BMI 36.30 kg/m²  Body surface area is 2.1 meters squared.  Pain Score    05/19/21 1119   PainSc:   2   PainLoc: Abdomen       Objective   Vital Signs:   /78   Pulse 80   Temp 98.4 °F (36.9 °C) (Temporal)   Resp 16   Ht 167.6 cm (66\")   Wt 102 kg (224 lb 14.4 oz)   SpO2 99%   Breastfeeding No   BMI 36.30 kg/m²  Body surface area is 2.1 meters squared.  Pain Score    05/19/21 1119   PainSc:   2   PainLoc: Abdomen     Ratna Flor reports a pain score of 2.  Given her pain assessment as noted, treatment options were discussed and the following options were decided upon as a follow-up plan to address the patient's pain: .      Patient's Body mass index is 36.3 kg/m². indicating that she is .      Physical Exam  Constitutional:       Appearance: Normal appearance.   HENT:      Head: Atraumatic.      Mouth/Throat:      Mouth: Mucous membranes are dry.   Cardiovascular:      Rate and Rhythm: Normal rate.   Pulmonary:      Effort: Pulmonary effort is normal.   Abdominal:      General: Abdomen is flat.   Musculoskeletal:         General: Normal range of motion.      Cervical back: Normal range of motion.   Skin:     General: Skin is dry.   Neurological:      General: No focal " deficit present.   Psychiatric:         Mood and Affect: Mood normal.         Behavior: Behavior normal.            Result Review :    LABS    Lab Results - Last 18 Months   Lab Units 05/19/21  1310 04/09/21  1121 03/24/21  1317 03/18/21  0336 03/17/21  0346 03/16/21  0944 03/16/21  0246 03/15/21  1855 03/15/21  1613 04/08/20  1448 04/08/20  1448   HEMOGLOBIN g/dL 11.1* 11.2* 10.2* 8.3* 8.5* 6.2* 5.5* 4.4* 4.6*  --  14.1   HEMATOCRIT % 35.3 37.3 34.7 27.9* 28.2* 21.6* 19.4* 16.5* 17.4*  --  41.4   MCV fL 81.1 76.7* 73.2* 68.7* 68.4*  --  63.4* 60.4* 60.8*  --  85.4   WBC 10*3/mm3 5.99 9.01 5.55 6.01 4.77  --  4.91 6.47 6.65  --  7.00   RDW % 19.1*  --  28.5* 25.4* 24.9*  --  21.9* 18.0* 18.0*  --  12.2*   MPV fL 10.4 9.8 10.4 10.4 10.4  --  10.5 10.5 10.5  --  9.3   PLATELETS 10*3/mm3 250 386 193 352 385  --  426 463* 488*  --  272   NEUTROS ABS 10*3/mm3 4.17 6.84  --  4.12 2.89  --  2.31 4.92 5.00   < > 4.70   LYMPHS ABS 10*3/mm3 1.20 1.39  --  0.90  --   --  1.87  --   --   --  1.50   MONOS ABS 10*3/mm3 0.56 0.58  --  0.75  --   --  0.59  --   --   --   --    EOS ABS 10*3/mm3 0.02 0.14  --  0.16 0.14  --  0.08 0.06  --   --   --    BASOS ABS 10*3/mm3 0.02 0.04  --  0.06 0.05  --  0.05 0.13  --   --   --    NRBC /100 WBC  --   --   --   --  2.0*  --   --   --   --   --   --    NEUTROPHIL % %  --   --   --   --  60.6  --   --  76.0 75.2  --   --    MONOCYTES % %  --   --   --   --  6.1  --   --  3.0* 6.9  --   --    BASOPHIL % %  --   --   --   --  1.0  --   --  2.0*  --   --   --    ATYP LYMPH % %  --   --   --   --  1.0  --   --   --   --   --   --    ANISOCYTOSIS   --   --   --   --  Slight/1+  --   --  Large/3+ Large/3+  --   --    GIANT PLT   --   --   --   --  Slight/1+  --   --  Slight/1+ Slight/1+  --   --     < > = values in this interval not displayed.       Lab Results - Last 18 Months   Lab Units 05/19/21  1310 03/18/21  0336 03/17/21  0346 03/16/21  0246 03/15/21  1855 03/15/21  1613 04/08/20  7209    GLUCOSE mg/dL 86 96 89 94 105* 94 92   SODIUM mmol/L 139 145 140 141 140 138 144   POTASSIUM mmol/L 4.2 3.8 3.7 4.0 4.1 4.2 4.0   CO2 mmol/L 24.0 23.0 25.0 24.0 24.0 23.0 26.0   CHLORIDE mmol/L 104 111* 104 107 106 105 105   ANION GAP mmol/L 11.0 11.0 11.0 10.0 10.0 10.0 13.0   CREATININE mg/dL 0.60 0.57 0.59 0.61 0.55* 0.55* 0.59   BUN mg/dL 12 8 9 9 9 10 12   BUN / CREAT RATIO  20.0 14.0 15.3 14.8 16.4 18.2 20.3   CALCIUM mg/dL 9.2 8.7 8.9 9.1 9.2 9.5 9.6   EGFR IF NONAFRICN AM mL/min/1.73  --  119 115 110 124 124 115   ALK PHOS U/L 60  --   --   --  46 48 59   TOTAL PROTEIN g/dL 7.5  --   --   --  6.9 6.9 8.2   ALT (SGPT) U/L 10  --   --   --  10 9 13   AST (SGOT) U/L 18  --   --   --  18 16 19   BILIRUBIN mg/dL 0.9  --   --   --  0.9 0.8 1.3*   ALBUMIN g/dL 4.30  --   --   --  4.30 4.40 4.50   GLOBULIN gm/dL 3.2  --   --   --  2.6 2.5 3.7       Lab Results - Last 18 Months   Lab Units 05/19/21  1310   CEA ng/mL 1.05   REFERENCE LAB REPORT  See Attached Report  See Attached Report       Lab Results - Last 18 Months   Lab Units 05/27/21  1006 05/19/21  1310 03/15/21  2030 03/15/21  1855 04/23/20  0829   IRON mcg/dL 37 112  --  10*  --    TIBC mcg/dL 483 533  --  586*  --    IRON SATURATION % 8* 21  --  2*  --    FERRITIN ng/mL 17.60 14.82  --  2.49*  --    TSH uIU/mL  --   --   --   --  1.630   FOLATE ng/mL  --  16.00 16.20  --   --               Assessment and Plan   Problem List Items Addressed This Visit        Other    Anemia    Current Assessment & Plan     1. BM suppression needs to be examined         Relevant Orders    CBC & Differential (Completed)    Comprehensive Metabolic Panel (Completed)    Iron Profile (Completed)    Vitamin B12 (Completed)    Folate (Completed)    Ferritin (Completed)    Sedimentation Rate (Completed)    Peripheral Blood Smear (Completed)    Haptoglobin (Completed)    Flow Cytometry, Blood (Completed)    Iron deficiency anemia    Overview     Endoscopy and colonoscopy 3/2021  negative.  CT enterography negative 4/2021.  M2A capsule negative 4/2021.  Ferritin 2.5 and iron saturation 2% in March 2021.  Patient on iron supplementation.         Current Assessment & Plan     1. She probably need IV FE         Abnormal CT scan abdomen    Current Assessment & Plan     1. Rectal thickening is a concern  2. Prior colonoscope did not show malignancies         Anemia, blood loss    Overview     Added automatically from request for surgery [id number]         Current Assessment & Plan     1. She celary has loss of blood  2. Initial FENG for Fe def anemia will be done         Leukopenia - Primary    Current Assessment & Plan     1. BCR ABL and other blood tests will be done         Relevant Orders    Flow Cytometry, Blood (Completed)      Other Visit Diagnoses     Rectal mass        Relevant Orders    CEA (Completed)    Cancer Antigen 27.29 (Completed)    MRI Pelvis With & Without Contrast    Myeloproliferative disease (CMS/HCC)        Relevant Orders    Flow Cytometry, Blood (Completed)    BCR / ABL Qual By RT-PCR (Completed)    JAK2 Mutation Analysis, Qual        In summary  1. We will study both rectal thickening and abnormal CBC findings    Follow Up     Patient was given instructions and counseling regarding her condition or for health maintenance advice. Please see specific information pulled into the AVS if appropriate.

## 2021-06-09 ENCOUNTER — TRANSCRIBE ORDERS (OUTPATIENT)
Dept: LAB | Facility: HOSPITAL | Age: 36
End: 2021-06-09

## 2021-06-09 DIAGNOSIS — Z01.818 PRE-OP TESTING: Primary | ICD-10-CM

## 2021-06-11 ENCOUNTER — HOSPITAL ENCOUNTER (OUTPATIENT)
Dept: MRI IMAGING | Facility: HOSPITAL | Age: 36
Discharge: HOME | End: 2021-06-11
Admitting: INTERNAL MEDICINE

## 2021-06-11 DIAGNOSIS — K62.89 RECTAL MASS: ICD-10-CM

## 2021-06-11 PROCEDURE — 0 GADOBENATE DIMEGLUMINE 529 MG/ML SOLUTION: Performed by: INTERNAL MEDICINE

## 2021-06-11 PROCEDURE — 72197 MRI PELVIS W/O & W/DYE: CPT

## 2021-06-11 PROCEDURE — A9577 INJ MULTIHANCE: HCPCS | Performed by: INTERNAL MEDICINE

## 2021-06-11 RX ADMIN — GADOBENATE DIMEGLUMINE 20 ML: 529 INJECTION, SOLUTION INTRAVENOUS at 10:27

## 2021-06-14 ENCOUNTER — LAB (OUTPATIENT)
Dept: LAB | Facility: HOSPITAL | Age: 36
End: 2021-06-14

## 2021-06-14 LAB — SARS-COV-2 ORF1AB RESP QL NAA+PROBE: NOT DETECTED

## 2021-06-14 PROCEDURE — U0004 COV-19 TEST NON-CDC HGH THRU: HCPCS | Performed by: INTERNAL MEDICINE

## 2021-06-14 PROCEDURE — C9803 HOPD COVID-19 SPEC COLLECT: HCPCS | Performed by: INTERNAL MEDICINE

## 2021-06-16 ENCOUNTER — HOSPITAL ENCOUNTER (OUTPATIENT)
Dept: CT IMAGING | Facility: HOSPITAL | Age: 36
Discharge: HOME | End: 2021-06-16
Admitting: RADIOLOGY

## 2021-06-16 VITALS
DIASTOLIC BLOOD PRESSURE: 77 MMHG | TEMPERATURE: 98.3 F | SYSTOLIC BLOOD PRESSURE: 125 MMHG | OXYGEN SATURATION: 100 % | HEART RATE: 80 BPM | WEIGHT: 222.66 LBS | BODY MASS INDEX: 34.95 KG/M2 | RESPIRATION RATE: 16 BRPM | HEIGHT: 67 IN

## 2021-06-16 DIAGNOSIS — D72.819 LEUKOPENIA, UNSPECIFIED TYPE: ICD-10-CM

## 2021-06-16 DIAGNOSIS — D64.9 ANEMIA, UNSPECIFIED TYPE: ICD-10-CM

## 2021-06-16 LAB
APTT PPP: 28.5 SECONDS (ref 24.1–35)
BASOPHILS # BLD AUTO: 0.03 10*3/MM3 (ref 0–0.2)
BASOPHILS NFR BLD AUTO: 0.7 % (ref 0–1.5)
DEPRECATED RDW RBC AUTO: 36.9 FL (ref 37–54)
EOSINOPHIL # BLD AUTO: 0.08 10*3/MM3 (ref 0–0.4)
EOSINOPHIL NFR BLD AUTO: 1.8 % (ref 0.3–6.2)
ERYTHROCYTE [DISTWIDTH] IN BLOOD BY AUTOMATED COUNT: 13.5 % (ref 12.3–15.4)
HCT VFR BLD AUTO: 35.5 % (ref 34–46.6)
HGB BLD-MCNC: 11.3 G/DL (ref 12–15.9)
IMM GRANULOCYTES # BLD AUTO: 0.01 10*3/MM3 (ref 0–0.05)
IMM GRANULOCYTES NFR BLD AUTO: 0.2 % (ref 0–0.5)
INR PPP: 1.08 (ref 0.91–1.09)
LYMPHOCYTES # BLD AUTO: 1.24 10*3/MM3 (ref 0.7–3.1)
LYMPHOCYTES NFR BLD AUTO: 27.2 % (ref 19.6–45.3)
MCH RBC QN AUTO: 26.3 PG (ref 26.6–33)
MCHC RBC AUTO-ENTMCNC: 31.8 G/DL (ref 31.5–35.7)
MCV RBC AUTO: 82.6 FL (ref 79–97)
MONOCYTES # BLD AUTO: 0.5 10*3/MM3 (ref 0.1–0.9)
MONOCYTES NFR BLD AUTO: 11 % (ref 5–12)
NEUTROPHILS NFR BLD AUTO: 2.7 10*3/MM3 (ref 1.7–7)
NEUTROPHILS NFR BLD AUTO: 59.1 % (ref 42.7–76)
NRBC BLD AUTO-RTO: 0 /100 WBC (ref 0–0.2)
PLATELET # BLD AUTO: 220 10*3/MM3 (ref 140–450)
PMV BLD AUTO: 9.5 FL (ref 6–12)
PROTHROMBIN TIME: 13.2 SECONDS (ref 11.5–13.4)
RBC # BLD AUTO: 4.3 10*6/MM3 (ref 3.77–5.28)
WBC # BLD AUTO: 4.56 10*3/MM3 (ref 3.4–10.8)

## 2021-06-16 PROCEDURE — 25010000002 MIDAZOLAM PER 1 MG: Performed by: RADIOLOGY

## 2021-06-16 PROCEDURE — 85025 COMPLETE CBC W/AUTO DIFF WBC: CPT | Performed by: RADIOLOGY

## 2021-06-16 PROCEDURE — 88313 SPECIAL STAINS GROUP 2: CPT | Performed by: INTERNAL MEDICINE

## 2021-06-16 PROCEDURE — 25010000002 FENTANYL CITRATE (PF) 50 MCG/ML SOLUTION: Performed by: RADIOLOGY

## 2021-06-16 PROCEDURE — 85730 THROMBOPLASTIN TIME PARTIAL: CPT | Performed by: RADIOLOGY

## 2021-06-16 PROCEDURE — 88311 DECALCIFY TISSUE: CPT | Performed by: INTERNAL MEDICINE

## 2021-06-16 PROCEDURE — 25010000003 LIDOCAINE 1 % SOLUTION: Performed by: RADIOLOGY

## 2021-06-16 PROCEDURE — 77012 CT SCAN FOR NEEDLE BIOPSY: CPT

## 2021-06-16 PROCEDURE — 88305 TISSUE EXAM BY PATHOLOGIST: CPT | Performed by: INTERNAL MEDICINE

## 2021-06-16 PROCEDURE — 85610 PROTHROMBIN TIME: CPT | Performed by: RADIOLOGY

## 2021-06-16 RX ORDER — MIDAZOLAM HYDROCHLORIDE 1 MG/ML
INJECTION INTRAMUSCULAR; INTRAVENOUS
Status: COMPLETED | OUTPATIENT
Start: 2021-06-16 | End: 2021-06-16

## 2021-06-16 RX ORDER — FENTANYL CITRATE 50 UG/ML
INJECTION, SOLUTION INTRAMUSCULAR; INTRAVENOUS
Status: COMPLETED | OUTPATIENT
Start: 2021-06-16 | End: 2021-06-16

## 2021-06-16 RX ORDER — SODIUM CHLORIDE 0.9 % (FLUSH) 0.9 %
3 SYRINGE (ML) INJECTION EVERY 12 HOURS SCHEDULED
Status: DISCONTINUED | OUTPATIENT
Start: 2021-06-16 | End: 2021-06-17 | Stop reason: HOSPADM

## 2021-06-16 RX ORDER — LIDOCAINE HYDROCHLORIDE 10 MG/ML
INJECTION, SOLUTION INFILTRATION; PERINEURAL
Status: COMPLETED | OUTPATIENT
Start: 2021-06-16 | End: 2021-06-16

## 2021-06-16 RX ORDER — SODIUM CHLORIDE 0.9 % (FLUSH) 0.9 %
10 SYRINGE (ML) INJECTION AS NEEDED
Status: DISCONTINUED | OUTPATIENT
Start: 2021-06-16 | End: 2021-06-17 | Stop reason: HOSPADM

## 2021-06-16 RX ADMIN — FENTANYL CITRATE 100 MCG: 50 INJECTION, SOLUTION INTRAMUSCULAR; INTRAVENOUS at 08:59

## 2021-06-16 RX ADMIN — MIDAZOLAM 1 MG: 1 INJECTION INTRAMUSCULAR; INTRAVENOUS at 08:59

## 2021-06-16 RX ADMIN — LIDOCAINE HYDROCHLORIDE 10 ML: 10 INJECTION, SOLUTION INFILTRATION; PERINEURAL at 09:00

## 2021-06-16 RX ADMIN — FENTANYL CITRATE 50 MCG: 50 INJECTION, SOLUTION INTRAMUSCULAR; INTRAVENOUS at 09:05

## 2021-06-16 RX ADMIN — MIDAZOLAM 1 MG: 1 INJECTION INTRAMUSCULAR; INTRAVENOUS at 09:03

## 2021-06-16 NOTE — INTERVAL H&P NOTE
H&P reviewed. The patient was examined and there are no changes to the H&P.      Risk, Benefits, and Alternatives discussed with the patient.  History and Physical reviewed, and no changes.  Plan is for moderate sedation, and the patient agrees to proceed with procedure.

## 2021-06-17 ENCOUNTER — LAB (OUTPATIENT)
Dept: LAB | Facility: HOSPITAL | Age: 36
End: 2021-06-17

## 2021-06-17 ENCOUNTER — OFFICE VISIT (OUTPATIENT)
Dept: ONCOLOGY | Facility: CLINIC | Age: 36
End: 2021-06-17

## 2021-06-17 VITALS
DIASTOLIC BLOOD PRESSURE: 70 MMHG | SYSTOLIC BLOOD PRESSURE: 110 MMHG | RESPIRATION RATE: 16 BRPM | HEART RATE: 75 BPM | BODY MASS INDEX: 35.24 KG/M2 | OXYGEN SATURATION: 99 % | TEMPERATURE: 98 F | HEIGHT: 67 IN | WEIGHT: 224.5 LBS

## 2021-06-17 DIAGNOSIS — R10.13 EPIGASTRIC PAIN: ICD-10-CM

## 2021-06-17 DIAGNOSIS — K62.89 PROCTITIS: ICD-10-CM

## 2021-06-17 DIAGNOSIS — R10.30 LOWER ABDOMINAL PAIN: ICD-10-CM

## 2021-06-17 DIAGNOSIS — D64.9 ANEMIA, UNSPECIFIED TYPE: Primary | ICD-10-CM

## 2021-06-17 DIAGNOSIS — K52.9 COLITIS: ICD-10-CM

## 2021-06-17 DIAGNOSIS — E61.1 IRON DEFICIENCY: ICD-10-CM

## 2021-06-17 PROCEDURE — 99214 OFFICE O/P EST MOD 30 MIN: CPT | Performed by: INTERNAL MEDICINE

## 2021-06-17 RX ORDER — DIPHENHYDRAMINE HYDROCHLORIDE 50 MG/ML
50 INJECTION INTRAMUSCULAR; INTRAVENOUS AS NEEDED
Status: CANCELLED | OUTPATIENT
Start: 2021-06-22

## 2021-06-17 RX ORDER — SODIUM CHLORIDE 9 MG/ML
250 INJECTION, SOLUTION INTRAVENOUS ONCE
Status: CANCELLED | OUTPATIENT
Start: 2021-06-22

## 2021-06-17 RX ORDER — FAMOTIDINE 10 MG/ML
20 INJECTION, SOLUTION INTRAVENOUS AS NEEDED
Status: CANCELLED | OUTPATIENT
Start: 2021-06-22

## 2021-06-17 RX ORDER — DEXAMETHASONE 1 MG
1 TABLET ORAL 2 TIMES DAILY WITH MEALS
Qty: 28 TABLET | Refills: 2 | Status: SHIPPED | OUTPATIENT
Start: 2021-06-17 | End: 2021-07-01

## 2021-06-17 RX ORDER — ACETAMINOPHEN 325 MG/1
650 TABLET ORAL ONCE
Status: CANCELLED | OUTPATIENT
Start: 2021-06-22

## 2021-06-18 NOTE — PROGRESS NOTES
[facility] ONC [facility] GROUP HEMATOLOGY AND ONCOLOGY  [address]  [place] [address] [phone]    She was previously seen for several hematological problems. She had abd CT scan which has shown rectal thickening and that she had Fe def anemia. Today she is returning with MRI and also other key labs    Rectal bleeding      Overview       Added automatically from request for surgery [id number]           Current Assessment & Plan       1. She may have rectal bleeding  2. Her ferritin Fe profiles are WNL  3. Her MRI is pending  4. Concern is rectal ca  5. Tummor marker is WNL           Epigastric pain     Overview       Added automatically from request for surgery [id number]           Current Assessment & Plan       1. This is improved with reglan           Anemia - Primary     Current Assessment & Plan       1. She does have a components of anemia from BM suppression  2. We have discussed about potential etiology and expected BM BX findings          .    Oncology/Hematology History    No history exists.         PAST MEDICAL HISTORY:  ALLERGIES:  No Known Allergies  CURRENT MEDICATIONS:  Outpatient Encounter Medications as of 6/17/2021   Medication Sig Dispense Refill   • metoclopramide (REGLAN) 5 MG tablet Take 1 tablet by mouth 4 (Four) Times a Day Before Meals & at Bedtime. 56 tablet 1   • ondansetron ODT (ZOFRAN-ODT) 8 MG disintegrating tablet Place 1 tablet on the tongue Every 8 (Eight) Hours As Needed for Nausea or Vomiting. 15 tablet 0   • pantoprazole (PROTONIX) 40 MG EC tablet TAKE 1 TABLET BY MOUTH EVERY DAY 90 tablet 1   • dexamethasone (DECADRON) 1 MG tablet Take 1 tablet by mouth 2 (Two) Times a Day With Meals for 14 days. 28 tablet 2   • ferrous sulfate (FerrouSul) 325 (65 FE) MG tablet Take 1 tablet by mouth Daily With Breakfast. (Patient taking differently: Take 325 mg by mouth Every 72 (Seventy-Two) Hours.)       Facility-Administered Encounter  Medications as of 6/17/2021   Medication Dose Route Frequency Provider Last Rate Last Admin   • [COMPLETED] fentaNYL citrate (PF) (SUBLIMAZE) injection   Intravenous Code / Trauma / Sedation Medication Darek Ryan MD   100 mcg at 06/16/21 0859   • [COMPLETED] fentaNYL citrate (PF) (SUBLIMAZE) injection   Intravenous Code / Trauma / Sedation Medication Darek Ryan MD   50 mcg at 06/16/21 0905   • [COMPLETED] lidocaine (XYLOCAINE) 1 % injection   Infiltration Code / Trauma / Sedation Medication Darek Ryan MD   10 mL at 06/16/21 0900   • [COMPLETED] midazolam (VERSED) injection   Intravenous Code / Trauma / Sedation Medication Darek Ryan MD   1 mg at 06/16/21 0859   • [COMPLETED] midazolam (VERSED) injection   Intravenous Code / Trauma / Sedation Medication Darek Ryan MD   1 mg at 06/16/21 0903   • [DISCONTINUED] sodium chloride 0.9 % flush 10 mL  10 mL Intravenous PRN Danelle Enamorado MD       • [DISCONTINUED] sodium chloride 0.9 % flush 3 mL  3 mL Intravenous Q12H Danelle Enamorado MD         ADULT ILLNESSES:  Patient Active Problem List   Diagnosis Code   • Other constipation K59.09   • Rectal bleeding K62.5   • Other hemorrhoids K64.8   • Epigastric pain R10.13   • Gastroesophageal reflux disease with esophagitis K21.00   • Nausea R11.0   • Gastrointestinal hemorrhage K92.2   • Anemia D64.9   • Iron deficiency anemia D50.9   • Abnormal CT scan abdomen R93.89   • Anemia, blood loss D50.0   • Blood in the stool K92.1   • Lower abdominal pain R10.30   • Leukopenia D72.819   • Iron deficiency E61.1   • Colitis K52.9     SURGERIES:  Past Surgical History:   Procedure Laterality Date   • CAPSULE ENDOSCOPY N/A 4/16/2021    Procedure: CAPSULE ENDOSCOPY M2A;  Surgeon: Octavia Mattson MD;  Location: [facility] ENDOSCOPY;  Service: Gastroenterology;  Laterality: N/A; Normal exam.  No blood in lumen.  Capsule seen entering the colon.   • COLONOSCOPY N/A 5/22/2019    Non-bleeding  internal hemorrhoids; The examination was otherwise normal; No specimens collected; Repeat colonoscopy at age 48 for screening purposes   • COLONOSCOPY N/A 3/18/2021    Dr. Hernandez-The examined portion of the ileum was normal; Erythematous mucosa in the rectum-biopsied; Non-bleeding internal hemorrhoids   • ENDOSCOPY N/A 6/17/2020    LA Grade A reflux esophagitis; Normal stomach-biopsied; Normal examined duodenum   • ENDOSCOPY N/A 3/18/2021    Dr. Hernandez-Z-line irregular-biopsied; Normal stomach-biopsied; Normal first portion of the duodenum and second portion of the duodenum   • WISDOM TOOTH EXTRACTION       HEALTH MAINTENANCE ITEMS:  Health Maintenance Due   Topic Date Due   • ANNUAL PHYSICAL  Never done   • HEPATITIS C SCREENING  Never done   • PAP SMEAR  Never done   • COVID-19 Vaccine (2 - Moderna 2-dose series) 04/03/2021       <no information>  Last Completed Colonoscopy     This patient has no relevant Health Maintenance data.          There is no immunization history on file for this patient.  Last Completed Mammogram     This patient has no relevant Health Maintenance data.            FAMILY HISTORY:  Family History   Problem Relation Age of Onset   • Heart defect Mother    • No Known Problems Father    • Hypertension Maternal Grandmother    • No Known Problems Maternal Grandfather    • No Known Problems Paternal Grandmother    • Prostate cancer Paternal Grandfather    • Pancreatic cancer Paternal Grandfather    • Colon cancer Neg Hx    • Colon polyps Neg Hx    • Esophageal cancer Neg Hx    • Liver cancer Neg Hx    • Stomach cancer Neg Hx    • Rectal cancer Neg Hx    • Liver disease Neg Hx      SOCIAL HISTORY:  Social History     Socioeconomic History   • Marital status:      Spouse name: Not on file   • Number of children: Not on file   • Years of education: Not on file   • Highest education level: Not on file   Tobacco Use   • Smoking status: Former Smoker     Types: Cigarettes   • Smokeless  "tobacco: Never Used   Vaping Use   • Vaping Use: Never used   Substance and Sexual Activity   • Alcohol use: Yes     Comment: Occasionally   • Drug use: No   • Sexual activity: Defer       REVIEW OF SYSTEMS:  Review of Systems   Constitutional: Positive for activity change and fatigue.   HENT: Negative.    Eyes: Negative.    Respiratory: Negative.    Gastrointestinal: Positive for abdominal pain and indigestion.   Endocrine: Negative.    Genitourinary: Negative.    Musculoskeletal: Positive for back pain.   Allergic/Immunologic: Negative.    Neurological: Positive for dizziness and weakness.   Psychiatric/Behavioral: Positive for stress.       /70   Pulse 75   Temp 98 °F (36.7 °C)   Resp 16   Ht 170.2 cm (67\")   Wt 102 kg (224 lb 8 oz)   SpO2 99%   Breastfeeding No   BMI 35.16 kg/m²  Body surface area is 2.13 meters squared.  Pain Score    06/17/21 0929   PainSc:   3       Objective   Vital Signs:   /70   Pulse 75   Temp 98 °F (36.7 °C)   Resp 16   Ht 170.2 cm (67\")   Wt 102 kg (224 lb 8 oz)   SpO2 99%   Breastfeeding No   BMI 35.16 kg/m²  Body surface area is 2.13 meters squared.  Pain Score    06/17/21 0929   PainSc:   3     Ratna Flor reports a pain score of 3.  Given her pain assessment as noted, treatment options were discussed and the following options were decided upon as a follow-up plan to address the patient's pain:     Patient's Body mass index is 35.16 kg/m². indicating that she is .      Physical Exam  Constitutional:       Appearance: Normal appearance. She is obese.   HENT:      Head: Atraumatic.      Mouth/Throat:      Mouth: Mucous membranes are moist.   Cardiovascular:      Rate and Rhythm: Normal rate.   Pulmonary:      Effort: Pulmonary effort is normal.   Abdominal:      Palpations: Abdomen is soft.   Musculoskeletal:         General: Normal range of motion.      Cervical back: Normal range of motion.   Skin:     General: Skin is warm.   Psychiatric:         " Mood and Affect: Mood normal.         Behavior: Behavior normal.            Result Review :     LABS    Lab Results - Last 18 Months   Lab Units 06/16/21  0732 05/19/21  1310 04/09/21  1121 03/24/21  1317 03/18/21  0336 03/17/21  0346 03/16/21  0246 03/15/21  1855 03/15/21  1613 04/08/20  1448 04/08/20  1448   HEMOGLOBIN g/dL 11.3* 11.1* 11.2* 10.2* 8.3* 8.5* 5.5* 4.4* 4.6*  --  14.1   HEMATOCRIT % 35.5 35.3 37.3 34.7 27.9* 28.2* 19.4* 16.5* 17.4*  --  41.4   MCV fL 82.6 81.1 76.7* 73.2* 68.7* 68.4* 63.4* 60.4* 60.8*  --  85.4   WBC 10*3/mm3 4.56 5.99 9.01 5.55 6.01 4.77 4.91 6.47 6.65  --  7.00   RDW % 13.5 19.1*  --  28.5* 25.4* 24.9* 21.9* 18.0* 18.0*  --  12.2*   MPV fL 9.5 10.4 9.8 10.4 10.4 10.4 10.5 10.5 10.5  --  9.3   PLATELETS 10*3/mm3 220 250 386 193 352 385 426 463* 488*  --  272   IMM GRAN % % 0.2  --   --   --   --   --   --   --   --   --   --    NEUTROS ABS 10*3/mm3 2.70 4.17 6.84  --  4.12 2.89 2.31 4.92 5.00   < > 4.70   LYMPHS ABS 10*3/mm3 1.24 1.20 1.39  --  0.90  --  1.87  --   --   --  1.50   MONOS ABS 10*3/mm3 0.50 0.56 0.58  --  0.75  --  0.59  --   --   --   --    EOS ABS 10*3/mm3 0.08 0.02 0.14  --  0.16 0.14 0.08 0.06  --   --   --    BASOS ABS 10*3/mm3 0.03 0.02 0.04  --  0.06 0.05 0.05 0.13  --   --   --    IMMATURE GRANS (ABS) 10*3/mm3 0.01  --   --   --   --   --   --   --   --   --   --    NRBC /100 WBC 0.0  --   --   --   --  2.0*  --   --   --   --   --    NEUTROPHIL % %  --   --   --   --   --  60.6  --  76.0 75.2  --   --    MONOCYTES % %  --   --   --   --   --  6.1  --  3.0* 6.9  --   --    BASOPHIL % %  --   --   --   --   --  1.0  --  2.0*  --   --   --    ATYP LYMPH % %  --   --   --   --   --  1.0  --   --   --   --   --    ANISOCYTOSIS   --   --   --   --   --  Slight/1+  --  Large/3+ Large/3+  --   --    GIANT PLT   --   --   --   --   --  Slight/1+  --  Slight/1+ Slight/1+  --   --     < > = values in this interval not displayed.       Lab Results - Last 18 Months   Lab  Units 05/19/21  1310 03/18/21  0336 03/17/21  0346 03/16/21  0246 03/15/21  1855 03/15/21  1613 04/08/20  1448   GLUCOSE mg/dL 86 96 89 94 105* 94 92   SODIUM mmol/L 139 145 140 141 140 138 144   POTASSIUM mmol/L 4.2 3.8 3.7 4.0 4.1 4.2 4.0   CO2 mmol/L 24.0 23.0 25.0 24.0 24.0 23.0 26.0   CHLORIDE mmol/L 104 111* 104 107 106 105 105   ANION GAP mmol/L 11.0 11.0 11.0 10.0 10.0 10.0 13.0   CREATININE mg/dL 0.60 0.57 0.59 0.61 0.55* 0.55* 0.59   BUN mg/dL 12 8 9 9 9 10 12   BUN / CREAT RATIO  20.0 14.0 15.3 14.8 16.4 18.2 20.3   CALCIUM mg/dL 9.2 8.7 8.9 9.1 9.2 9.5 9.6   EGFR IF NONAFRICN AM mL/min/1.73  --  119 115 110 124 124 115   ALK PHOS U/L 60  --   --   --  46 48 59   TOTAL PROTEIN g/dL 7.5  --   --   --  6.9 6.9 8.2   ALT (SGPT) U/L 10  --   --   --  10 9 13   AST (SGOT) U/L 18  --   --   --  18 16 19   BILIRUBIN mg/dL 0.9  --   --   --  0.9 0.8 1.3*   ALBUMIN g/dL 4.30  --   --   --  4.30 4.40 4.50   GLOBULIN gm/dL 3.2  --   --   --  2.6 2.5 3.7       Lab Results - Last 18 Months   Lab Units 05/19/21  1310   CEA ng/mL 1.05   REFERENCE LAB REPORT  See Attached Report  See Attached Report       Lab Results - Last 18 Months   Lab Units 05/27/21  1006 05/19/21  1310 03/15/21  2030 03/15/21  1855 04/23/20  0829   IRON mcg/dL 37 112  --  10*  --    TIBC mcg/dL 483 533  --  586*  --    IRON SATURATION % 8* 21  --  2*  --    FERRITIN ng/mL 17.60 14.82  --  2.49*  --    TSH uIU/mL  --   --   --   --  1.630   FOLATE ng/mL  --  16.00 16.20  --   --               Assessment and Plan   Problem List Items Addressed This Visit        Other    Epigastric pain    Overview     Added automatically from request for surgery [id number]         Anemia - Primary    Current Assessment & Plan     1. She clearly has anemia of chronic DS  2. Hopely low dose steroid will improve this         Relevant Orders    CBC & Differential    Comprehensive Metabolic Panel    Ferritin    Sedimentation Rate    Lower abdominal pain    Current  Assessment & Plan     1. This has been somewhat improved         Iron deficiency    Colitis    Current Assessment & Plan     1. Overall clinical presentation seems to suggest that she has colitis and autoimmune or hyperimmune mechanism are involved  2. Mechanistically, ER stress response and activation of innate immune system in the GI tract seems to be the triggering events           Other Visit Diagnoses     Proctitis            In summary  1. She will have IV FE  2. She does not have anorectal malignancy  3. She has inflammation likely to be autoimmune colitis  4. We will start low dose steroid  5. Will see as a FU    Follow Up     Patient was given instructions and counseling regarding her condition or for health maintenance advice. Please see specific information pulled into the AVS if appropriate.

## 2021-06-18 NOTE — ASSESSMENT & PLAN NOTE
1. Overall clinical presentation seems to suggest that she has colitis and autoimmune or hyperimmune mechanism are involved  2. Mechanistically, ER stress response and activation of innate immune system in the GI tract seems to be the triggering events

## 2021-06-18 NOTE — ASSESSMENT & PLAN NOTE
1. Her BM BX did not show MDS/MPD/CML/MM  2. This is likley due to cytokine effects with autoimmune disease

## 2021-06-22 ENCOUNTER — INFUSION (OUTPATIENT)
Dept: ONCOLOGY | Facility: HOSPITAL | Age: 36
End: 2021-06-22

## 2021-06-22 VITALS
DIASTOLIC BLOOD PRESSURE: 60 MMHG | TEMPERATURE: 97.1 F | WEIGHT: 220.4 LBS | HEIGHT: 67 IN | SYSTOLIC BLOOD PRESSURE: 103 MMHG | BODY MASS INDEX: 34.59 KG/M2 | HEART RATE: 58 BPM | RESPIRATION RATE: 18 BRPM | OXYGEN SATURATION: 99 %

## 2021-06-22 DIAGNOSIS — D50.9 IRON DEFICIENCY ANEMIA, UNSPECIFIED IRON DEFICIENCY ANEMIA TYPE: Primary | ICD-10-CM

## 2021-06-22 PROCEDURE — 25010000002 IRON SUCROSE PER 1 MG: Performed by: INTERNAL MEDICINE

## 2021-06-22 PROCEDURE — 25010000002 DIPHENHYDRAMINE PER 50 MG: Performed by: INTERNAL MEDICINE

## 2021-06-22 PROCEDURE — 96367 TX/PROPH/DG ADDL SEQ IV INF: CPT

## 2021-06-22 PROCEDURE — 96365 THER/PROPH/DIAG IV INF INIT: CPT

## 2021-06-22 RX ORDER — SODIUM CHLORIDE 9 MG/ML
250 INJECTION, SOLUTION INTRAVENOUS ONCE
Status: COMPLETED | OUTPATIENT
Start: 2021-06-22 | End: 2021-06-22

## 2021-06-22 RX ORDER — ACETAMINOPHEN 325 MG/1
650 TABLET ORAL ONCE
Status: COMPLETED | OUTPATIENT
Start: 2021-06-22 | End: 2021-06-22

## 2021-06-22 RX ADMIN — IRON SUCROSE 200 MG: 20 INJECTION, SOLUTION INTRAVENOUS at 11:57

## 2021-06-22 RX ADMIN — SODIUM CHLORIDE 250 ML: 9 INJECTION, SOLUTION INTRAVENOUS at 11:14

## 2021-06-22 RX ADMIN — ACETAMINOPHEN 650 MG: 325 TABLET, FILM COATED ORAL at 11:21

## 2021-06-22 RX ADMIN — DIPHENHYDRAMINE HYDROCHLORIDE 25 MG: 50 INJECTION, SOLUTION INTRAMUSCULAR; INTRAVENOUS at 11:22

## 2021-06-28 LAB — REF LAB TEST METHOD: NORMAL

## 2021-06-29 LAB
LAB AP CASE REPORT: NORMAL
PATH REPORT.FINAL DX SPEC: NORMAL
PATH REPORT.GROSS SPEC: NORMAL

## 2021-07-02 ENCOUNTER — OFFICE VISIT (OUTPATIENT)
Dept: ONCOLOGY | Facility: CLINIC | Age: 36
End: 2021-07-02

## 2021-07-02 ENCOUNTER — LAB (OUTPATIENT)
Dept: LAB | Facility: HOSPITAL | Age: 36
End: 2021-07-02

## 2021-07-02 VITALS
OXYGEN SATURATION: 98 % | WEIGHT: 225.4 LBS | TEMPERATURE: 97.4 F | DIASTOLIC BLOOD PRESSURE: 88 MMHG | SYSTOLIC BLOOD PRESSURE: 106 MMHG | RESPIRATION RATE: 16 BRPM | HEART RATE: 70 BPM | BODY MASS INDEX: 35.38 KG/M2 | HEIGHT: 67 IN

## 2021-07-02 DIAGNOSIS — K52.9 COLITIS: ICD-10-CM

## 2021-07-02 DIAGNOSIS — R93.89 ABNORMAL CT SCAN: ICD-10-CM

## 2021-07-02 DIAGNOSIS — K62.5 RECTAL BLEEDING: ICD-10-CM

## 2021-07-02 DIAGNOSIS — E61.1 IRON DEFICIENCY: ICD-10-CM

## 2021-07-02 DIAGNOSIS — D63.8 ANEMIA, CHRONIC DISEASE: Primary | ICD-10-CM

## 2021-07-02 DIAGNOSIS — D64.9 ANEMIA, UNSPECIFIED TYPE: ICD-10-CM

## 2021-07-02 LAB
ALBUMIN SERPL-MCNC: 4.1 G/DL (ref 3.5–5.2)
ALBUMIN/GLOB SERPL: 1.5 G/DL
ALP SERPL-CCNC: 55 U/L (ref 39–117)
ALT SERPL W P-5'-P-CCNC: 9 U/L (ref 1–33)
ANION GAP SERPL CALCULATED.3IONS-SCNC: 5 MMOL/L (ref 5–15)
AST SERPL-CCNC: 12 U/L (ref 1–32)
BASOPHILS # BLD AUTO: 0.03 10*3/MM3 (ref 0–0.2)
BASOPHILS NFR BLD AUTO: 0.5 % (ref 0–1.5)
BILIRUB SERPL-MCNC: 1 MG/DL (ref 0–1.2)
BUN SERPL-MCNC: 13 MG/DL (ref 6–20)
BUN/CREAT SERPL: 24.1 (ref 7–25)
CALCIUM SPEC-SCNC: 9.4 MG/DL (ref 8.6–10.5)
CHLORIDE SERPL-SCNC: 101 MMOL/L (ref 98–107)
CO2 SERPL-SCNC: 26 MMOL/L (ref 22–29)
CREAT SERPL-MCNC: 0.54 MG/DL (ref 0.57–1)
DEPRECATED RDW RBC AUTO: 39.2 FL (ref 37–54)
EOSINOPHIL # BLD AUTO: 0.04 10*3/MM3 (ref 0–0.4)
EOSINOPHIL NFR BLD AUTO: 0.6 % (ref 0.3–6.2)
ERYTHROCYTE [DISTWIDTH] IN BLOOD BY AUTOMATED COUNT: 13.2 % (ref 12.3–15.4)
ERYTHROCYTE [SEDIMENTATION RATE] IN BLOOD: 16 MM/HR (ref 0–20)
FERRITIN SERPL-MCNC: 23.13 NG/ML (ref 13–150)
GFR SERPL CREATININE-BSD FRML MDRD: >150 ML/MIN/1.73
GLOBULIN UR ELPH-MCNC: 2.7 GM/DL
GLUCOSE SERPL-MCNC: 109 MG/DL (ref 65–99)
HCT VFR BLD AUTO: 34.1 % (ref 34–46.6)
HGB BLD-MCNC: 11 G/DL (ref 12–15.9)
IMM GRANULOCYTES # BLD AUTO: 0.01 10*3/MM3 (ref 0–0.05)
IMM GRANULOCYTES NFR BLD AUTO: 0.2 % (ref 0–0.5)
LYMPHOCYTES # BLD AUTO: 1.03 10*3/MM3 (ref 0.7–3.1)
LYMPHOCYTES NFR BLD AUTO: 16.3 % (ref 19.6–45.3)
MCH RBC QN AUTO: 26.4 PG (ref 26.6–33)
MCHC RBC AUTO-ENTMCNC: 32.3 G/DL (ref 31.5–35.7)
MCV RBC AUTO: 81.8 FL (ref 79–97)
MONOCYTES # BLD AUTO: 0.49 10*3/MM3 (ref 0.1–0.9)
MONOCYTES NFR BLD AUTO: 7.7 % (ref 5–12)
NEUTROPHILS NFR BLD AUTO: 4.73 10*3/MM3 (ref 1.7–7)
NEUTROPHILS NFR BLD AUTO: 74.7 % (ref 42.7–76)
NRBC BLD AUTO-RTO: 0 /100 WBC (ref 0–0.2)
PLATELET # BLD AUTO: 241 10*3/MM3 (ref 140–450)
PMV BLD AUTO: 9.9 FL (ref 6–12)
POTASSIUM SERPL-SCNC: 4.1 MMOL/L (ref 3.5–5.2)
PROT SERPL-MCNC: 6.8 G/DL (ref 6–8.5)
RBC # BLD AUTO: 4.17 10*6/MM3 (ref 3.77–5.28)
SODIUM SERPL-SCNC: 132 MMOL/L (ref 136–145)
WBC # BLD AUTO: 6.33 10*3/MM3 (ref 3.4–10.8)

## 2021-07-02 PROCEDURE — 36415 COLL VENOUS BLD VENIPUNCTURE: CPT

## 2021-07-02 PROCEDURE — 82728 ASSAY OF FERRITIN: CPT

## 2021-07-02 PROCEDURE — 80053 COMPREHEN METABOLIC PANEL: CPT

## 2021-07-02 PROCEDURE — 99214 OFFICE O/P EST MOD 30 MIN: CPT | Performed by: INTERNAL MEDICINE

## 2021-07-02 PROCEDURE — 85025 COMPLETE CBC W/AUTO DIFF WBC: CPT

## 2021-07-02 PROCEDURE — 85651 RBC SED RATE NONAUTOMATED: CPT

## 2021-07-02 RX ORDER — DEXAMETHASONE 2 MG/1
2 TABLET ORAL DAILY
Qty: 14 TABLET | Refills: 2 | Status: SHIPPED | OUTPATIENT
Start: 2021-07-02 | End: 2021-07-16

## 2021-07-05 PROBLEM — D63.8 ANEMIA, CHRONIC DISEASE: Status: ACTIVE | Noted: 2021-03-15

## 2021-07-19 ENCOUNTER — OFFICE VISIT (OUTPATIENT)
Dept: ONCOLOGY | Facility: CLINIC | Age: 36
End: 2021-07-19

## 2021-07-19 ENCOUNTER — LAB (OUTPATIENT)
Dept: LAB | Facility: HOSPITAL | Age: 36
End: 2021-07-19

## 2021-07-19 VITALS
TEMPERATURE: 97.4 F | RESPIRATION RATE: 16 BRPM | HEIGHT: 67 IN | OXYGEN SATURATION: 98 % | WEIGHT: 221.8 LBS | BODY MASS INDEX: 34.81 KG/M2 | SYSTOLIC BLOOD PRESSURE: 102 MMHG | HEART RATE: 59 BPM | DIASTOLIC BLOOD PRESSURE: 70 MMHG

## 2021-07-19 DIAGNOSIS — D72.819 LEUKOPENIA, UNSPECIFIED TYPE: ICD-10-CM

## 2021-07-19 DIAGNOSIS — E61.1 IRON DEFICIENCY: ICD-10-CM

## 2021-07-19 DIAGNOSIS — D50.8 OTHER IRON DEFICIENCY ANEMIA: ICD-10-CM

## 2021-07-19 DIAGNOSIS — D63.8 ANEMIA, CHRONIC DISEASE: Primary | ICD-10-CM

## 2021-07-19 DIAGNOSIS — D50.0 ANEMIA, BLOOD LOSS: ICD-10-CM

## 2021-07-19 DIAGNOSIS — D64.9 ANEMIA, UNSPECIFIED TYPE: Primary | ICD-10-CM

## 2021-07-19 DIAGNOSIS — K52.9 COLITIS: ICD-10-CM

## 2021-07-19 LAB
ALBUMIN SERPL-MCNC: 4.6 G/DL (ref 3.5–5.2)
ALBUMIN/GLOB SERPL: 2 G/DL
ALP SERPL-CCNC: 52 U/L (ref 39–117)
ALT SERPL W P-5'-P-CCNC: 14 U/L (ref 1–33)
ANION GAP SERPL CALCULATED.3IONS-SCNC: 9 MMOL/L (ref 5–15)
AST SERPL-CCNC: 15 U/L (ref 1–32)
BASOPHILS # BLD AUTO: 0.02 10*3/MM3 (ref 0–0.2)
BASOPHILS NFR BLD AUTO: 0.3 % (ref 0–1.5)
BILIRUB SERPL-MCNC: 1.5 MG/DL (ref 0–1.2)
BUN SERPL-MCNC: 16 MG/DL (ref 6–20)
BUN/CREAT SERPL: 25.8 (ref 7–25)
CALCIUM SPEC-SCNC: 9.2 MG/DL (ref 8.6–10.5)
CHLORIDE SERPL-SCNC: 104 MMOL/L (ref 98–107)
CO2 SERPL-SCNC: 26 MMOL/L (ref 22–29)
CREAT SERPL-MCNC: 0.62 MG/DL (ref 0.57–1)
DEPRECATED RDW RBC AUTO: 41.2 FL (ref 37–54)
EOSINOPHIL # BLD AUTO: 0.02 10*3/MM3 (ref 0–0.4)
EOSINOPHIL NFR BLD AUTO: 0.3 % (ref 0.3–6.2)
ERYTHROCYTE [DISTWIDTH] IN BLOOD BY AUTOMATED COUNT: 13.7 % (ref 12.3–15.4)
ERYTHROCYTE [SEDIMENTATION RATE] IN BLOOD: 12 MM/HR (ref 0–20)
FERRITIN SERPL-MCNC: 9.57 NG/ML (ref 13–150)
GFR SERPL CREATININE-BSD FRML MDRD: 131 ML/MIN/1.73
GLOBULIN UR ELPH-MCNC: 2.3 GM/DL
GLUCOSE SERPL-MCNC: 95 MG/DL (ref 65–99)
HCT VFR BLD AUTO: 35.9 % (ref 34–46.6)
HGB BLD-MCNC: 11.3 G/DL (ref 12–15.9)
HOLD SPECIMEN: NORMAL
IMM GRANULOCYTES # BLD AUTO: 0.02 10*3/MM3 (ref 0–0.05)
IMM GRANULOCYTES NFR BLD AUTO: 0.3 % (ref 0–0.5)
IRON 24H UR-MRATE: 28 MCG/DL (ref 37–145)
IRON SATN MFR SERPL: 6 % (ref 20–50)
LYMPHOCYTES # BLD AUTO: 1.15 10*3/MM3 (ref 0.7–3.1)
LYMPHOCYTES NFR BLD AUTO: 16 % (ref 19.6–45.3)
MCH RBC QN AUTO: 26.2 PG (ref 26.6–33)
MCHC RBC AUTO-ENTMCNC: 31.5 G/DL (ref 31.5–35.7)
MCV RBC AUTO: 83.3 FL (ref 79–97)
MONOCYTES # BLD AUTO: 0.47 10*3/MM3 (ref 0.1–0.9)
MONOCYTES NFR BLD AUTO: 6.5 % (ref 5–12)
NEUTROPHILS NFR BLD AUTO: 5.53 10*3/MM3 (ref 1.7–7)
NEUTROPHILS NFR BLD AUTO: 76.6 % (ref 42.7–76)
NRBC BLD AUTO-RTO: 0 /100 WBC (ref 0–0.2)
PLATELET # BLD AUTO: 206 10*3/MM3 (ref 140–450)
PMV BLD AUTO: 9.7 FL (ref 6–12)
POTASSIUM SERPL-SCNC: 3.9 MMOL/L (ref 3.5–5.2)
PROT SERPL-MCNC: 6.9 G/DL (ref 6–8.5)
RBC # BLD AUTO: 4.31 10*6/MM3 (ref 3.77–5.28)
SODIUM SERPL-SCNC: 139 MMOL/L (ref 136–145)
TIBC SERPL-MCNC: 490 MCG/DL (ref 298–536)
TRANSFERRIN SERPL-MCNC: 329 MG/DL (ref 200–360)
WBC # BLD AUTO: 7.21 10*3/MM3 (ref 3.4–10.8)

## 2021-07-19 PROCEDURE — 83540 ASSAY OF IRON: CPT

## 2021-07-19 PROCEDURE — 99214 OFFICE O/P EST MOD 30 MIN: CPT | Performed by: INTERNAL MEDICINE

## 2021-07-19 PROCEDURE — 82728 ASSAY OF FERRITIN: CPT

## 2021-07-19 PROCEDURE — 85651 RBC SED RATE NONAUTOMATED: CPT

## 2021-07-19 PROCEDURE — 80053 COMPREHEN METABOLIC PANEL: CPT

## 2021-07-19 PROCEDURE — 36415 COLL VENOUS BLD VENIPUNCTURE: CPT

## 2021-07-19 PROCEDURE — 84466 ASSAY OF TRANSFERRIN: CPT

## 2021-07-19 PROCEDURE — 85025 COMPLETE CBC W/AUTO DIFF WBC: CPT

## 2021-07-19 RX ORDER — DEXAMETHASONE 1 MG
1 TABLET ORAL 2 TIMES DAILY WITH MEALS
Qty: 60 TABLET | Refills: 2 | Status: SHIPPED | OUTPATIENT
Start: 2021-07-19 | End: 2021-08-18

## 2021-07-19 NOTE — PROGRESS NOTES
[facility] GROUP HEMATOLOGY AND ONCOLOGY  [address]  [place] [address] [phone]    Chief Complaint  Anemia, chronic disease (follow up)    Subjective    Anemia (follow up) and Leukopenia, unspecified type (follow up)     She was previously seen for several hematological problems. She had abd CT scan which has shown rectal thickening and that she had Fe def anemia. Today she is returning with MRI and also other key labs     Now this is a FU visit after we have increased dose of steroid for anemia of chronic DS        Oncology/Hematology History    No history exists.         PAST MEDICAL HISTORY:  ALLERGIES:  No Known Allergies  CURRENT MEDICATIONS:  Outpatient Encounter Medications as of 7/19/2021   Medication Sig Dispense Refill   • ferrous sulfate (FerrouSul) 325 (65 FE) MG tablet Take 1 tablet by mouth Daily With Breakfast. (Patient taking differently: Take 325 mg by mouth Every 72 (Seventy-Two) Hours.)     • metoclopramide (REGLAN) 5 MG tablet Take 1 tablet by mouth 4 (Four) Times a Day Before Meals & at Bedtime. 56 tablet 1   • ondansetron ODT (ZOFRAN-ODT) 8 MG disintegrating tablet Place 1 tablet on the tongue Every 8 (Eight) Hours As Needed for Nausea or Vomiting. 15 tablet 0   • pantoprazole (PROTONIX) 40 MG EC tablet TAKE 1 TABLET BY MOUTH EVERY DAY 90 tablet 1   • dexamethasone (DECADRON) 1 MG tablet Take 1 tablet by mouth 2 (Two) Times a Day With Meals for 30 days. 60 tablet 2     No facility-administered encounter medications on file as of 7/19/2021.     ADULT ILLNESSES:  Patient Active Problem List   Diagnosis Code   • Other constipation K59.09   • Rectal bleeding K62.5   • Other hemorrhoids K64.8   • Epigastric pain R10.13   • Gastroesophageal reflux disease with esophagitis K21.00   • Nausea R11.0   • Gastrointestinal hemorrhage K92.2   • Anemia, chronic disease D63.8   • Iron deficiency anemia D50.9   • Abnormal CT scan abdomen  R93.89   • Anemia, blood loss D50.0   • Blood in the stool K92.1   • Lower abdominal pain R10.30   • Leukopenia D72.819   • Iron deficiency E61.1   • Colitis K52.9     SURGERIES:  Past Surgical History:   Procedure Laterality Date   • CAPSULE ENDOSCOPY N/A 4/16/2021    Procedure: CAPSULE ENDOSCOPY M2A;  Surgeon: Octavia Mattson MD;  Location: [facility] ENDOSCOPY;  Service: Gastroenterology;  Laterality: N/A; Normal exam.  No blood in lumen.  Capsule seen entering the colon.   • COLONOSCOPY N/A 5/22/2019    Non-bleeding internal hemorrhoids; The examination was otherwise normal; No specimens collected; Repeat colonoscopy at age 48 for screening purposes   • COLONOSCOPY N/A 3/18/2021    Dr. Hernandez-The examined portion of the ileum was normal; Erythematous mucosa in the rectum-biopsied; Non-bleeding internal hemorrhoids   • ENDOSCOPY N/A 6/17/2020    LA Grade A reflux esophagitis; Normal stomach-biopsied; Normal examined duodenum   • ENDOSCOPY N/A 3/18/2021    Dr. Hernandez-Z-line irregular-biopsied; Normal stomach-biopsied; Normal first portion of the duodenum and second portion of the duodenum   • WISDOM TOOTH EXTRACTION       HEALTH MAINTENANCE ITEMS:  Health Maintenance Due   Topic Date Due   • ANNUAL PHYSICAL  Never done   • HEPATITIS C SCREENING  Never done   • PAP SMEAR  Never done   • COVID-19 Vaccine (2 - Moderna 2-dose series) 04/03/2021       <no information>  Last Completed Colonoscopy     This patient has no relevant Health Maintenance data.          There is no immunization history on file for this patient.  Last Completed Mammogram     This patient has no relevant Health Maintenance data.            FAMILY HISTORY:  Family History   Problem Relation Age of Onset   • Heart defect Mother    • No Known Problems Father    • Hypertension Maternal Grandmother    • No Known Problems Maternal Grandfather    • No Known Problems Paternal Grandmother    • Prostate cancer Paternal Grandfather    • Pancreatic cancer  "Paternal Grandfather    • Colon cancer Neg Hx    • Colon polyps Neg Hx    • Esophageal cancer Neg Hx    • Liver cancer Neg Hx    • Stomach cancer Neg Hx    • Rectal cancer Neg Hx    • Liver disease Neg Hx      SOCIAL HISTORY:  Social History     Socioeconomic History   • Marital status:      Spouse name: Not on file   • Number of children: Not on file   • Years of education: Not on file   • Highest education level: Not on file   Tobacco Use   • Smoking status: Former Smoker     Types: Cigarettes   • Smokeless tobacco: Never Used   Vaping Use   • Vaping Use: Never used   Substance and Sexual Activity   • Alcohol use: Yes     Comment: Occasionally   • Drug use: No   • Sexual activity: Defer       REVIEW OF SYSTEMS:  Review of Systems   Constitutional: Positive for activity change and unexpected weight loss.   HENT: Negative.    Eyes: Negative.    Respiratory: Negative.    Gastrointestinal: Positive for diarrhea and indigestion.   Endocrine: Negative.    Genitourinary: Negative.    Musculoskeletal: Negative.    Allergic/Immunologic: Negative.    Neurological: Positive for weakness.   Psychiatric/Behavioral: Positive for stress.       /70   Pulse 59   Temp 97.4 °F (36.3 °C)   Resp 16   Ht 170.2 cm (67\")   Wt 101 kg (221 lb 12.8 oz)   SpO2 98%   Breastfeeding No   BMI 34.74 kg/m²  Body surface area is 2.12 meters squared.  Pain Score    07/19/21 1059   PainSc: 0-No pain       Objective   Vital Signs:   /70   Pulse 59   Temp 97.4 °F (36.3 °C)   Resp 16   Ht 170.2 cm (67\")   Wt 101 kg (221 lb 12.8 oz)   SpO2 98%   Breastfeeding No   BMI 34.74 kg/m²  Body surface area is 2.12 meters squared.  Pain Score    07/19/21 1059   PainSc: 0-No pain     Ratna Flor reports a pain score of 0.  Given her pain assessment as noted, treatment options were discussed and the following options were decided upon as a follow-up plan to address the patient's pain: .      Patient's Body mass index is " 34.74 kg/m². indicating that she is .      Physical Exam  Constitutional:       Appearance: Normal appearance.   HENT:      Head: Atraumatic.      Mouth/Throat:      Mouth: Mucous membranes are dry.   Eyes:      Extraocular Movements: Extraocular movements intact.   Cardiovascular:      Rate and Rhythm: Normal rate and regular rhythm.   Pulmonary:      Effort: Pulmonary effort is normal.   Abdominal:      Palpations: Abdomen is soft.   Musculoskeletal:         General: Normal range of motion.      Cervical back: Normal range of motion.   Skin:     General: Skin is warm and dry.   Neurological:      General: No focal deficit present.      Mental Status: She is oriented to person, place, and time.   Psychiatric:         Mood and Affect: Mood normal.         Behavior: Behavior normal.            Result Review :  LABS    Lab Results - Last 18 Months   Lab Units 07/19/21  1046 07/02/21  0934 06/16/21  0732 05/19/21  1310 04/09/21  1121 03/24/21  1317 03/18/21  0336 03/17/21  0346 03/15/21  1855 03/15/21  1613 03/15/21  1613   HEMOGLOBIN g/dL 11.3* 11.0* 11.3* 11.1* 11.2* 10.2* 8.3* 8.5* 4.4*  --  4.6*   HEMATOCRIT % 35.9 34.1 35.5 35.3 37.3 34.7 27.9* 28.2* 16.5*  --  17.4*   MCV fL 83.3 81.8 82.6 81.1 76.7* 73.2* 68.7* 68.4* 60.4*  --  60.8*   WBC 10*3/mm3 7.21 6.33 4.56 5.99 9.01 5.55 6.01 4.77 6.47  --  6.65   RDW % 13.7 13.2 13.5 19.1*  --  28.5* 25.4* 24.9* 18.0*  --  18.0*   MPV fL 9.7 9.9 9.5 10.4 9.8 10.4 10.4 10.4 10.5  --  10.5   PLATELETS 10*3/mm3 206 241 220 250 386 193 352 385 463*  --  488*   IMM GRAN % % 0.3 0.2 0.2  --   --   --   --   --   --   --   --    NEUTROS ABS 10*3/mm3 5.53 4.73 2.70 4.17 6.84  --  4.12 2.89 4.92   < > 5.00   LYMPHS ABS 10*3/mm3 1.15 1.03 1.24 1.20 1.39  --  0.90  --   --    < >  --    MONOS ABS 10*3/mm3 0.47 0.49 0.50 0.56 0.58  --  0.75  --   --    < >  --    EOS ABS 10*3/mm3 0.02 0.04 0.08 0.02 0.14  --  0.16 0.14 0.06   < >  --    BASOS ABS 10*3/mm3 0.02 0.03 0.03 0.02 0.04   --  0.06 0.05 0.13   < >  --    IMMATURE GRANS (ABS) 10*3/mm3 0.02 0.01 0.01  --   --   --   --   --   --   --   --    NRBC /100 WBC 0.0 0.0 0.0  --   --   --   --  2.0*  --   --   --    NEUTROPHIL % %  --   --   --   --   --   --   --  60.6 76.0  --  75.2   MONOCYTES % %  --   --   --   --   --   --   --  6.1 3.0*  --  6.9   BASOPHIL % %  --   --   --   --   --   --   --  1.0 2.0*  --   --    ATYP LYMPH % %  --   --   --   --   --   --   --  1.0  --   --   --    ANISOCYTOSIS   --   --   --   --   --   --   --  Slight/1+ Large/3+  --  Large/3+   GIANT PLT   --   --   --   --   --   --   --  Slight/1+ Slight/1+  --  Slight/1+    < > = values in this interval not displayed.       Lab Results - Last 18 Months   Lab Units 07/19/21  1046 07/02/21  0934 05/19/21  1310 03/18/21  0336 03/17/21  0346 03/16/21  0246 03/15/21  1855 03/15/21  1613 04/08/20  1448   GLUCOSE mg/dL 95 109* 86 96 89 94 105* 94 92   SODIUM mmol/L 139 132* 139 145 140 141 140 138 144   POTASSIUM mmol/L 3.9 4.1 4.2 3.8 3.7 4.0 4.1 4.2 4.0   CO2 mmol/L 26.0 26.0 24.0 23.0 25.0 24.0 24.0 23.0 26.0   CHLORIDE mmol/L 104 101 104 111* 104 107 106 105 105   ANION GAP mmol/L 9.0 5.0 11.0 11.0 11.0 10.0 10.0 10.0 13.0   CREATININE mg/dL 0.62 0.54* 0.60 0.57 0.59 0.61 0.55* 0.55* 0.59   BUN mg/dL 16 13 12 8 9 9 9 10 12   BUN / CREAT RATIO  25.8* 24.1 20.0 14.0 15.3 14.8 16.4 18.2 20.3   CALCIUM mg/dL 9.2 9.4 9.2 8.7 8.9 9.1 9.2 9.5 9.6   EGFR IF NONAFRICN AM mL/min/1.73  --   --   --  119 115 110 124 124 115   ALK PHOS U/L 52 55 60  --   --   --  46 48 59   TOTAL PROTEIN g/dL 6.9 6.8 7.5  --   --   --  6.9 6.9 8.2   ALT (SGPT) U/L 14 9 10  --   --   --  10 9 13   AST (SGOT) U/L 15 12 18  --   --   --  18 16 19   BILIRUBIN mg/dL 1.5* 1.0 0.9  --   --   --  0.9 0.8 1.3*   ALBUMIN g/dL 4.60 4.10 4.30  --   --   --  4.30 4.40 4.50   GLOBULIN gm/dL 2.3 2.7 3.2  --   --   --  2.6 2.5 3.7       Lab Results - Last 18 Months   Lab Units 05/19/21  1310   CEA ng/mL 1.05    REFERENCE LAB REPORT  See Attached Result   See Attached Report  See Attached Report       Lab Results - Last 18 Months   Lab Units 07/19/21  1046 07/02/21  0934 05/27/21  1006 05/19/21  1310 03/15/21  2030 03/15/21  1855 04/23/20  0829   IRON mcg/dL 28*  --  37 112  --  10*  --    TIBC mcg/dL 490  --  483 533  --  586*  --    IRON SATURATION % 6*  --  8* 21  --  2*  --    FERRITIN ng/mL 9.57* 23.13 17.60 14.82  --  2.49*  --    TSH uIU/mL  --   --   --   --   --   --  1.630   FOLATE ng/mL  --   --   --  16.00 16.20  --   --               Assessment and Plan    Problem List Items Addressed This Visit     None      Visit Diagnoses     Anemia, unspecified type    -  Primary    Relevant Orders    CBC & Differential    Comprehensive Metabolic Panel        In summary  1. She has colitis  2. She is taking dexa 2 and 4 mg alternating dose  3. She feels better  4. She has Fe profile pending now  - She may need IV FE    Follow Up     Patient was given instructions and counseling regarding her condition or for health maintenance advice. Please see specific information pulled into the AVS if appropriate.

## 2021-07-20 ENCOUNTER — TELEPHONE (OUTPATIENT)
Dept: ONCOLOGY | Facility: CLINIC | Age: 36
End: 2021-07-20

## 2021-07-20 RX ORDER — DIPHENHYDRAMINE HYDROCHLORIDE 50 MG/ML
50 INJECTION INTRAMUSCULAR; INTRAVENOUS AS NEEDED
Status: CANCELLED | OUTPATIENT
Start: 2021-07-28

## 2021-07-20 RX ORDER — SODIUM CHLORIDE 9 MG/ML
250 INJECTION, SOLUTION INTRAVENOUS ONCE
Status: CANCELLED | OUTPATIENT
Start: 2021-07-29

## 2021-07-20 RX ORDER — FAMOTIDINE 10 MG/ML
20 INJECTION, SOLUTION INTRAVENOUS AS NEEDED
Status: CANCELLED | OUTPATIENT
Start: 2021-07-28

## 2021-07-20 RX ORDER — FAMOTIDINE 10 MG/ML
20 INJECTION, SOLUTION INTRAVENOUS AS NEEDED
Status: CANCELLED | OUTPATIENT
Start: 2021-07-29

## 2021-07-20 RX ORDER — DIPHENHYDRAMINE HYDROCHLORIDE 50 MG/ML
50 INJECTION INTRAMUSCULAR; INTRAVENOUS AS NEEDED
Status: CANCELLED | OUTPATIENT
Start: 2021-07-29

## 2021-07-20 RX ORDER — SODIUM CHLORIDE 9 MG/ML
250 INJECTION, SOLUTION INTRAVENOUS ONCE
Status: CANCELLED | OUTPATIENT
Start: 2021-07-28

## 2021-07-20 RX ORDER — ACETAMINOPHEN 325 MG/1
650 TABLET ORAL ONCE
Status: CANCELLED | OUTPATIENT
Start: 2021-07-28

## 2021-07-20 RX ORDER — ACETAMINOPHEN 325 MG/1
650 TABLET ORAL ONCE
Status: CANCELLED | OUTPATIENT
Start: 2021-07-29

## 2021-07-20 NOTE — ASSESSMENT & PLAN NOTE
1. After her visit her Ferritin came back as low level  2. She will need additional 2 infusion of FE

## 2021-07-20 NOTE — TELEPHONE ENCOUNTER
Attempted to call Ratna concerning dates and time for Venofer infusions.  Scheduled for 7/28/21 and 7/29/21 at 2:15.

## 2021-07-20 NOTE — ASSESSMENT & PLAN NOTE
1. We have increased dose of steroid  2. Her GI SX has been improved greatly  3. Will follow overall nutritional profiles

## 2021-07-28 ENCOUNTER — INFUSION (OUTPATIENT)
Dept: ONCOLOGY | Facility: HOSPITAL | Age: 36
End: 2021-07-28

## 2021-07-28 VITALS
SYSTOLIC BLOOD PRESSURE: 110 MMHG | BODY MASS INDEX: 35.2 KG/M2 | HEART RATE: 59 BPM | DIASTOLIC BLOOD PRESSURE: 51 MMHG | HEIGHT: 66 IN | OXYGEN SATURATION: 98 % | TEMPERATURE: 98.1 F | WEIGHT: 219 LBS | RESPIRATION RATE: 18 BRPM

## 2021-07-28 DIAGNOSIS — D50.9 IRON DEFICIENCY ANEMIA, UNSPECIFIED IRON DEFICIENCY ANEMIA TYPE: Primary | ICD-10-CM

## 2021-07-28 PROCEDURE — 25010000002 DIPHENHYDRAMINE PER 50 MG: Performed by: NURSE PRACTITIONER

## 2021-07-28 PROCEDURE — 96375 TX/PRO/DX INJ NEW DRUG ADDON: CPT

## 2021-07-28 PROCEDURE — 96365 THER/PROPH/DIAG IV INF INIT: CPT

## 2021-07-28 PROCEDURE — 25010000002 HYDROCORTISONE SODIUM SUCCINATE 100 MG RECONSTITUTED SOLUTION: Performed by: NURSE PRACTITIONER

## 2021-07-28 PROCEDURE — 25010000002 IRON SUCROSE PER 1 MG: Performed by: NURSE PRACTITIONER

## 2021-07-28 RX ORDER — FAMOTIDINE 10 MG/ML
20 INJECTION, SOLUTION INTRAVENOUS AS NEEDED
Status: DISCONTINUED | OUTPATIENT
Start: 2021-07-28 | End: 2021-07-28 | Stop reason: HOSPADM

## 2021-07-28 RX ORDER — DIPHENHYDRAMINE HYDROCHLORIDE 50 MG/ML
25 INJECTION INTRAMUSCULAR; INTRAVENOUS ONCE
Status: CANCELLED | OUTPATIENT
Start: 2021-07-29

## 2021-07-28 RX ORDER — FAMOTIDINE 10 MG/ML
20 INJECTION, SOLUTION INTRAVENOUS AS NEEDED
Status: CANCELLED | OUTPATIENT
Start: 2021-07-29

## 2021-07-28 RX ORDER — SODIUM CHLORIDE 9 MG/ML
250 INJECTION, SOLUTION INTRAVENOUS ONCE
Status: CANCELLED | OUTPATIENT
Start: 2021-07-28

## 2021-07-28 RX ORDER — DIPHENHYDRAMINE HYDROCHLORIDE 50 MG/ML
25 INJECTION INTRAMUSCULAR; INTRAVENOUS ONCE
Status: CANCELLED | OUTPATIENT
Start: 2021-07-28

## 2021-07-28 RX ORDER — FAMOTIDINE 10 MG/ML
20 INJECTION, SOLUTION INTRAVENOUS AS NEEDED
Status: CANCELLED | OUTPATIENT
Start: 2021-07-28

## 2021-07-28 RX ORDER — DIPHENHYDRAMINE HYDROCHLORIDE 50 MG/ML
50 INJECTION INTRAMUSCULAR; INTRAVENOUS AS NEEDED
Status: CANCELLED | OUTPATIENT
Start: 2021-07-28

## 2021-07-28 RX ORDER — SODIUM CHLORIDE 9 MG/ML
250 INJECTION, SOLUTION INTRAVENOUS ONCE
Status: CANCELLED | OUTPATIENT
Start: 2021-07-29

## 2021-07-28 RX ORDER — SODIUM CHLORIDE 9 MG/ML
250 INJECTION, SOLUTION INTRAVENOUS ONCE
Status: COMPLETED | OUTPATIENT
Start: 2021-07-28 | End: 2021-07-28

## 2021-07-28 RX ORDER — DIPHENHYDRAMINE HYDROCHLORIDE 50 MG/ML
25 INJECTION INTRAMUSCULAR; INTRAVENOUS ONCE
Status: COMPLETED | OUTPATIENT
Start: 2021-07-28 | End: 2021-07-28

## 2021-07-28 RX ORDER — DIPHENHYDRAMINE HYDROCHLORIDE 50 MG/ML
50 INJECTION INTRAMUSCULAR; INTRAVENOUS AS NEEDED
Status: CANCELLED | OUTPATIENT
Start: 2021-07-29

## 2021-07-28 RX ORDER — DIPHENHYDRAMINE HYDROCHLORIDE 50 MG/ML
50 INJECTION INTRAMUSCULAR; INTRAVENOUS AS NEEDED
Status: DISCONTINUED | OUTPATIENT
Start: 2021-07-28 | End: 2021-07-28 | Stop reason: HOSPADM

## 2021-07-28 RX ADMIN — IRON SUCROSE 200 MG: 20 INJECTION, SOLUTION INTRAVENOUS at 15:06

## 2021-07-28 RX ADMIN — SODIUM CHLORIDE 250 ML: 9 INJECTION, SOLUTION INTRAVENOUS at 14:50

## 2021-07-28 RX ADMIN — DIPHENHYDRAMINE HYDROCHLORIDE 25 MG: 50 INJECTION, SOLUTION INTRAMUSCULAR; INTRAVENOUS at 14:54

## 2021-07-28 RX ADMIN — HYDROCORTISONE SODIUM SUCCINATE 100 MG: 100 INJECTION, POWDER, FOR SOLUTION INTRAMUSCULAR; INTRAVENOUS at 14:56

## 2021-07-29 ENCOUNTER — INFUSION (OUTPATIENT)
Dept: ONCOLOGY | Facility: HOSPITAL | Age: 36
End: 2021-07-29

## 2021-07-29 VITALS
HEIGHT: 66 IN | OXYGEN SATURATION: 99 % | TEMPERATURE: 97.9 F | HEART RATE: 65 BPM | SYSTOLIC BLOOD PRESSURE: 120 MMHG | DIASTOLIC BLOOD PRESSURE: 63 MMHG | RESPIRATION RATE: 18 BRPM | BODY MASS INDEX: 35.52 KG/M2 | WEIGHT: 221 LBS

## 2021-07-29 DIAGNOSIS — D50.9 IRON DEFICIENCY ANEMIA, UNSPECIFIED IRON DEFICIENCY ANEMIA TYPE: Primary | ICD-10-CM

## 2021-07-29 PROCEDURE — 96365 THER/PROPH/DIAG IV INF INIT: CPT

## 2021-07-29 PROCEDURE — 25010000002 IRON SUCROSE PER 1 MG: Performed by: NURSE PRACTITIONER

## 2021-07-29 PROCEDURE — 96375 TX/PRO/DX INJ NEW DRUG ADDON: CPT

## 2021-07-29 PROCEDURE — 25010000002 HYDROCORTISONE SODIUM SUCCINATE 100 MG RECONSTITUTED SOLUTION: Performed by: NURSE PRACTITIONER

## 2021-07-29 RX ORDER — DIPHENHYDRAMINE HYDROCHLORIDE 50 MG/ML
25 INJECTION INTRAMUSCULAR; INTRAVENOUS ONCE
Status: DISCONTINUED | OUTPATIENT
Start: 2021-07-29 | End: 2021-07-29 | Stop reason: HOSPADM

## 2021-07-29 RX ORDER — FAMOTIDINE 10 MG/ML
20 INJECTION, SOLUTION INTRAVENOUS AS NEEDED
Status: DISCONTINUED | OUTPATIENT
Start: 2021-07-29 | End: 2021-07-29 | Stop reason: HOSPADM

## 2021-07-29 RX ORDER — SODIUM CHLORIDE 9 MG/ML
250 INJECTION, SOLUTION INTRAVENOUS ONCE
Status: COMPLETED | OUTPATIENT
Start: 2021-07-29 | End: 2021-07-29

## 2021-07-29 RX ORDER — DIPHENHYDRAMINE HYDROCHLORIDE 50 MG/ML
50 INJECTION INTRAMUSCULAR; INTRAVENOUS AS NEEDED
Status: DISCONTINUED | OUTPATIENT
Start: 2021-07-29 | End: 2021-07-29 | Stop reason: HOSPADM

## 2021-07-29 RX ADMIN — IRON SUCROSE 200 MG: 20 INJECTION, SOLUTION INTRAVENOUS at 14:57

## 2021-07-29 RX ADMIN — SODIUM CHLORIDE 250 ML: 9 INJECTION, SOLUTION INTRAVENOUS at 14:52

## 2021-07-29 RX ADMIN — HYDROCORTISONE SODIUM SUCCINATE 100 MG: 100 INJECTION, POWDER, FOR SOLUTION INTRAMUSCULAR; INTRAVENOUS at 14:55

## 2021-08-02 ENCOUNTER — APPOINTMENT (OUTPATIENT)
Dept: LAB | Facility: HOSPITAL | Age: 36
End: 2021-08-02

## 2021-08-02 ENCOUNTER — LAB (OUTPATIENT)
Dept: LAB | Facility: HOSPITAL | Age: 36
End: 2021-08-02

## 2021-08-02 ENCOUNTER — OFFICE VISIT (OUTPATIENT)
Dept: ONCOLOGY | Facility: CLINIC | Age: 36
End: 2021-08-02

## 2021-08-02 VITALS
BODY MASS INDEX: 35.55 KG/M2 | RESPIRATION RATE: 16 BRPM | WEIGHT: 221.2 LBS | SYSTOLIC BLOOD PRESSURE: 118 MMHG | HEIGHT: 66 IN | TEMPERATURE: 97.3 F | OXYGEN SATURATION: 98 % | HEART RATE: 72 BPM | DIASTOLIC BLOOD PRESSURE: 78 MMHG

## 2021-08-02 DIAGNOSIS — D50.9 IRON DEFICIENCY ANEMIA, UNSPECIFIED IRON DEFICIENCY ANEMIA TYPE: Primary | ICD-10-CM

## 2021-08-02 DIAGNOSIS — D50.9 IRON DEFICIENCY ANEMIA, UNSPECIFIED IRON DEFICIENCY ANEMIA TYPE: ICD-10-CM

## 2021-08-02 LAB
ALBUMIN SERPL-MCNC: 4.3 G/DL (ref 3.5–5.2)
ALBUMIN/GLOB SERPL: 1.9 G/DL
ALP SERPL-CCNC: 51 U/L (ref 39–117)
ALT SERPL W P-5'-P-CCNC: 16 U/L (ref 1–33)
ANION GAP SERPL CALCULATED.3IONS-SCNC: 10 MMOL/L (ref 5–15)
AST SERPL-CCNC: 14 U/L (ref 1–32)
BASOPHILS # BLD AUTO: 0.02 10*3/MM3 (ref 0–0.2)
BASOPHILS NFR BLD AUTO: 0.3 % (ref 0–1.5)
BILIRUB SERPL-MCNC: 1 MG/DL (ref 0–1.2)
BUN SERPL-MCNC: 14 MG/DL (ref 6–20)
BUN/CREAT SERPL: 21.2 (ref 7–25)
CALCIUM SPEC-SCNC: 9.4 MG/DL (ref 8.6–10.5)
CHLORIDE SERPL-SCNC: 103 MMOL/L (ref 98–107)
CO2 SERPL-SCNC: 26 MMOL/L (ref 22–29)
CREAT SERPL-MCNC: 0.66 MG/DL (ref 0.57–1)
DEPRECATED RDW RBC AUTO: 45 FL (ref 37–54)
EOSINOPHIL # BLD AUTO: 0.03 10*3/MM3 (ref 0–0.4)
EOSINOPHIL NFR BLD AUTO: 0.5 % (ref 0.3–6.2)
ERYTHROCYTE [DISTWIDTH] IN BLOOD BY AUTOMATED COUNT: 15.1 % (ref 12.3–15.4)
FERRITIN SERPL-MCNC: 156 NG/ML (ref 13–150)
GFR SERPL CREATININE-BSD FRML MDRD: 122 ML/MIN/1.73
GLOBULIN UR ELPH-MCNC: 2.3 GM/DL
GLUCOSE SERPL-MCNC: 97 MG/DL (ref 65–99)
HCT VFR BLD AUTO: 36.6 % (ref 34–46.6)
HGB BLD-MCNC: 11.9 G/DL (ref 12–15.9)
HOLD SPECIMEN: NORMAL
IMM GRANULOCYTES # BLD AUTO: 0.08 10*3/MM3 (ref 0–0.05)
IMM GRANULOCYTES NFR BLD AUTO: 1.3 % (ref 0–0.5)
IRON 24H UR-MRATE: 60 MCG/DL (ref 37–145)
IRON SATN MFR SERPL: 14 % (ref 20–50)
LYMPHOCYTES # BLD AUTO: 1.31 10*3/MM3 (ref 0.7–3.1)
LYMPHOCYTES NFR BLD AUTO: 20.7 % (ref 19.6–45.3)
MCH RBC QN AUTO: 27.1 PG (ref 26.6–33)
MCHC RBC AUTO-ENTMCNC: 32.5 G/DL (ref 31.5–35.7)
MCV RBC AUTO: 83.4 FL (ref 79–97)
MONOCYTES # BLD AUTO: 0.52 10*3/MM3 (ref 0.1–0.9)
MONOCYTES NFR BLD AUTO: 8.2 % (ref 5–12)
NEUTROPHILS NFR BLD AUTO: 4.36 10*3/MM3 (ref 1.7–7)
NEUTROPHILS NFR BLD AUTO: 69 % (ref 42.7–76)
NRBC BLD AUTO-RTO: 0 /100 WBC (ref 0–0.2)
PLATELET # BLD AUTO: 238 10*3/MM3 (ref 140–450)
PMV BLD AUTO: 10.4 FL (ref 6–12)
POTASSIUM SERPL-SCNC: 3.7 MMOL/L (ref 3.5–5.2)
PROT SERPL-MCNC: 6.6 G/DL (ref 6–8.5)
RBC # BLD AUTO: 4.39 10*6/MM3 (ref 3.77–5.28)
RETICS # AUTO: 0.09 10*6/MM3 (ref 0.02–0.13)
RETICS/RBC NFR AUTO: 2.08 % (ref 0.7–1.9)
SODIUM SERPL-SCNC: 139 MMOL/L (ref 136–145)
TIBC SERPL-MCNC: 414 MCG/DL (ref 298–536)
TRANSFERRIN SERPL-MCNC: 278 MG/DL (ref 200–360)
WBC # BLD AUTO: 6.32 10*3/MM3 (ref 3.4–10.8)

## 2021-08-02 PROCEDURE — 82525 ASSAY OF COPPER: CPT

## 2021-08-02 PROCEDURE — 82728 ASSAY OF FERRITIN: CPT

## 2021-08-02 PROCEDURE — 85045 AUTOMATED RETICULOCYTE COUNT: CPT

## 2021-08-02 PROCEDURE — 85025 COMPLETE CBC W/AUTO DIFF WBC: CPT

## 2021-08-02 PROCEDURE — 84238 ASSAY NONENDOCRINE RECEPTOR: CPT

## 2021-08-02 PROCEDURE — 36415 COLL VENOUS BLD VENIPUNCTURE: CPT

## 2021-08-02 PROCEDURE — 84466 ASSAY OF TRANSFERRIN: CPT

## 2021-08-02 PROCEDURE — 99215 OFFICE O/P EST HI 40 MIN: CPT | Performed by: NURSE PRACTITIONER

## 2021-08-02 PROCEDURE — 80053 COMPREHEN METABOLIC PANEL: CPT

## 2021-08-02 PROCEDURE — 83540 ASSAY OF IRON: CPT

## 2021-08-04 LAB
COPPER SERPL-MCNC: 108 UG/DL (ref 80–158)
STFR SERPL-SCNC: 31.4 NMOL/L (ref 12.2–27.3)

## 2021-08-09 ENCOUNTER — TELEPHONE (OUTPATIENT)
Dept: ONCOLOGY | Facility: CLINIC | Age: 36
End: 2021-08-09

## 2021-08-09 NOTE — TELEPHONE ENCOUNTER
Provider: SHAYNE KHANNA  Caller: EMERALD  Relationship to Patient: SELF  Phone Number: [phone]  Reason for Call: PT SAYS SHAYNE KHANNA IS SUPPOSED TO REFER TO AN AUTO-IMMUNE SPECIALIST. SHE ALSO SAID SHE HAS HAD A BAD EXPERIENCE WITH DR LYNN AND WISHES TO SEE A DIFFERENT GASTRO

## 2021-08-11 ENCOUNTER — LAB (OUTPATIENT)
Dept: LAB | Facility: HOSPITAL | Age: 36
End: 2021-08-11

## 2021-08-11 DIAGNOSIS — D50.9 IRON DEFICIENCY ANEMIA, UNSPECIFIED IRON DEFICIENCY ANEMIA TYPE: ICD-10-CM

## 2021-08-11 LAB
COLLECT DATE SP2 STL: NORMAL
COLLECT DATE SP3 STL: NORMAL
COLLECT DATE STL: NORMAL
HEMOCCULT STL QL: NEGATIVE
Lab: 1000
Lab: 1050
Lab: 800

## 2021-08-11 PROCEDURE — 82272 OCCULT BLD FECES 1-3 TESTS: CPT

## 2021-08-11 NOTE — TELEPHONE ENCOUNTER
CALLED PT AND LET HER KNOW THAT I WILL REFER HER TO ELVIA SOLOMON. I ALSO LET HER KNOW THAT FOR THE TIME BEING, THE REFERRAL TO THE AUTO-IMMUNE PROVIDER WILL BE PLACED ON HOLD PENDING RECOMMENDATIONS FROM GASTRO. PT V/U

## 2021-08-11 NOTE — TELEPHONE ENCOUNTER
Caller: Ratna Flor    Relationship: Self    Best call back number: [phone]     What is the best time to reach you: ANYTIME    Do you know the name of the person who called: UNSURE    What was the call regarding: SHE IS RETURNING A CALL FROM AROUND 11:30 OR SO. SHE ALREADY KNOWS ABOUT THE GASTRO AND AUTO-IMMUNE [place] ASKING FOR A CALL BACK IF THERE WAS ANYTHING ELSE SHE NEEDED TO KNOW.    Do you require a callback: IF YARI

## 2021-09-10 ENCOUNTER — TELEPHONE (OUTPATIENT)
Dept: ONCOLOGY | Facility: CLINIC | Age: 36
End: 2021-09-10

## 2021-09-10 ENCOUNTER — OFFICE VISIT (OUTPATIENT)
Dept: GASTROENTEROLOGY | Age: 36
End: 2021-09-10
Payer: COMMERCIAL

## 2021-09-10 VITALS
BODY MASS INDEX: 34.55 KG/M2 | WEIGHT: 215 LBS | HEIGHT: 66 IN | HEART RATE: 72 BPM | OXYGEN SATURATION: 98 % | DIASTOLIC BLOOD PRESSURE: 68 MMHG | SYSTOLIC BLOOD PRESSURE: 112 MMHG

## 2021-09-10 DIAGNOSIS — R11.2 NAUSEA AND VOMITING, INTRACTABILITY OF VOMITING NOT SPECIFIED, UNSPECIFIED VOMITING TYPE: ICD-10-CM

## 2021-09-10 DIAGNOSIS — D50.8 IRON DEFICIENCY ANEMIA SECONDARY TO INADEQUATE DIETARY IRON INTAKE: Primary | ICD-10-CM

## 2021-09-10 DIAGNOSIS — K62.5 BRBPR (BRIGHT RED BLOOD PER RECTUM): ICD-10-CM

## 2021-09-10 DIAGNOSIS — R10.9 ABDOMINAL CRAMPING: ICD-10-CM

## 2021-09-10 PROCEDURE — 99203 OFFICE O/P NEW LOW 30 MIN: CPT | Performed by: NURSE PRACTITIONER

## 2021-09-10 RX ORDER — DEXAMETHASONE 1 MG
TABLET ORAL 2 TIMES DAILY
COMMUNITY
Start: 2021-08-28 | End: 2021-11-18 | Stop reason: ALTCHOICE

## 2021-09-10 RX ORDER — PANTOPRAZOLE SODIUM 40 MG/1
TABLET, DELAYED RELEASE ORAL 2 TIMES DAILY
COMMUNITY
Start: 2021-03-18 | End: 2021-12-03 | Stop reason: SDUPTHER

## 2021-09-10 RX ORDER — DEXAMETHASONE 2 MG/1
TABLET ORAL EVERY OTHER DAY
Status: ON HOLD | COMMUNITY
Start: 2021-08-28 | End: 2021-10-07 | Stop reason: ALTCHOICE

## 2021-09-10 ASSESSMENT — ENCOUNTER SYMPTOMS
ANAL BLEEDING: 1
ABDOMINAL DISTENTION: 0
RECTAL PAIN: 0
COUGH: 0
NAUSEA: 1
DIARRHEA: 0
ABDOMINAL PAIN: 1
SHORTNESS OF BREATH: 0
TROUBLE SWALLOWING: 0
SORE THROAT: 0
VOMITING: 0
BLOOD IN STOOL: 0
VOICE CHANGE: 0
CONSTIPATION: 1
BACK PAIN: 0

## 2021-09-10 NOTE — TELEPHONE ENCOUNTER
PT CALLED IN REGARD TO HAD APPT TODAY WITH SHAYNE KHANNA 8AM LAB AND 8:30 FOLLOW UP HAD MISTAKENLY DOUBLE BOOKED WITH ANOTHER DR AND COULD NOT CALL IN TIME TO LET OFFICE KNOW       CALLING NOW TO R/S APPT FROM TODAY       WARM TRANSFERRED WITH JALEESA IN SCHEDULING TO FURTHER ASSIST

## 2021-09-10 NOTE — PATIENT INSTRUCTIONS

## 2021-09-10 NOTE — PROGRESS NOTES
polyps, colon CA, inflammatory bowel dx, gastric CA and esophageal CA. History reviewed. No pertinent past medical history. Past Surgical History:   Procedure Laterality Date    COLONOSCOPY  06/10/2021    [place]    COLONOSCOPY  05/21/2019    Dr Sae Sánchez:  nonbleeding internal hemorrhoids otherwise normal (repeat at age 46)   [address] [facility]  03/18/2021    Dr Eugenio [facility] GERD per patient     UPPER GASTROINTESTINAL ENDOSCOPY  06/17/2020    Dr Delmy Shannon History     Socioeconomic History    Marital status:      Spouse name: None    Number of children: None    Years of education: None    Highest education level: None   Occupational History    None   Tobacco Use    Smoking status: Never Smoker    Smokeless tobacco: Never Used   Vaping Use    Vaping Use: Never used   Substance and Sexual Activity    Alcohol use: Yes     Comment: two to three times weekly     Drug use: Never    Sexual activity: None   Other Topics Concern    None   Social History Narrative    None     Social Determinants of Health     Financial Resource Strain:     Difficulty of Paying Living Expenses:    Food Insecurity:     Worried About Running Out of Food in the Last Year:     Ran Out of Food in the Last Year:    Transportation Needs:     Lack of Transportation (Medical):      Lack of Transportation (Non-Medical):    Physical Activity:     Days of Exercise per Week:     Minutes of Exercise per Session:    Stress:     Feeling of Stress :    Social Connections:     Frequency of Communication with Friends and Family:     Frequency of Social Gatherings with Friends and Family:     Attends [employer] Services:     Active Member of Clubs or Organizations:     Attends Club or Organization Meetings:     Marital Status:    Intimate Partner Violence:     Fear of Current or Ex-Partner:     Emotionally Abused:     Physically Abused:     Sexually Abused:        No Known Allergies    Current Outpatient Medications   Medication Sig Dispense Refill    dexamethasone (DECADRON) 1 MG tablet 2 times daily      dexamethasone (DECADRON) 2 MG tablet every other day       pantoprazole (PROTONIX) 40 MG tablet daily      medroxyPROGESTERone (DEPO-PROVERA) 150 MG/ML injection Inject 150 mg into the muscle every 3 months       No current facility-administered medications for this visit. Review of Systems   Constitutional: Negative for appetite change, fatigue, fever and unexpected weight change. HENT: Negative for sore throat, trouble swallowing and voice change. Respiratory: Negative for cough and shortness of breath. Cardiovascular: Negative for chest pain, palpitations and leg swelling. Gastrointestinal: Positive for abdominal pain, anal bleeding, constipation and nausea. Negative for abdominal distention, blood in stool, diarrhea, rectal pain and vomiting. Genitourinary: Negative for hematuria. Musculoskeletal: Negative for arthralgias, back pain and neck pain. Neurological: Negative for dizziness, weakness, light-headedness and headaches. Psychiatric/Behavioral: Negative for dysphoric mood and sleep disturbance. The patient is not nervous/anxious. All other systems reviewed and are negative. Objective:     Physical Exam  Vitals and nursing note reviewed. Constitutional:       Appearance: She is well-developed. Comments: /68   Pulse 72   Ht 5' 6\" (1.676 m)   Wt 215 lb (97.5 kg)   SpO2 98%   BMI 34.70 kg/m²    Eyes:      General: No scleral icterus. Conjunctiva/sclera: Conjunctivae normal.      Pupils: Pupils are equal, round, and reactive to light. Neck:      Thyroid: No thyromegaly. Cardiovascular:      Rate and Rhythm: Normal rate and regular rhythm. Heart sounds: Normal heart sounds. No murmur heard. No friction rub. No gallop. Pulmonary:      Effort: Pulmonary effort is normal. No respiratory distress.       Breath sounds: Normal breath sounds. Abdominal:      General: Bowel sounds are normal. There is no distension. Palpations: Abdomen is soft. Tenderness: There is abdominal tenderness (upper abdomen with palpation). There is no rebound. Musculoskeletal:         General: No deformity. Normal range of motion. Cervical back: Normal range of motion and neck supple. Neurological:      Mental Status: She is alert and oriented to person, place, and time. Cranial Nerves: No cranial nerve deficit. Psychiatric:         Judgment: Judgment normal.           Assessment:       Diagnosis Orders   1. Iron deficiency anemia secondary to inadequate dietary iron intake  COLONOSCOPY W/ OR W/O BIOPSY    ESOPHAGOSCOPY / EGD   2. Abdominal cramping  COLONOSCOPY W/ OR W/O BIOPSY    ESOPHAGOSCOPY / EGD   3. BRBPR (bright red blood per rectum)  COLONOSCOPY W/ OR W/O BIOPSY   4. Nausea and vomiting, intractability of vomiting not specified, unspecified vomiting type  ESOPHAGOSCOPY / EGD         Plan:        1. Schedule outpatient endoscopy. Patient advised no Aspirin, Fish Oil, Vit E or NSAIDs 5 (five) days before procedure. Follow-up Visit: per Dr. Keli Delgado   Pt Education:   Risks, benefits, and alternatives to endoscopy were discussed. Patient voices understanding of risks of, but not limited to, perforation, bleeding, and infection. The risk of perforation is increased with esophageal dilatation. All questions answered to patient's satisfaction. Patient is agreable to proceed. 2. Schedule outpatient colonoscopy. Patient advised no Aspirin, Fish Oil, Vit E or NSAIDs 5 (five) days before procedure. Follow-up Visit: per Dr Keli Delgado  Pt education:  Risks, benefits, and alternatives to colonoscopy were discussed. Risks of colonoscopy include, but are not limited to, perforation, bleeding, and infection.   We discussed that the risk for perforation is 1-3 in 5,000  at the time of colonoscopy;   and 1-2% risk of bleeding post-polypectomy. All questions answered to the satisfaction of the patient. Pt is agreeable to proceed.

## 2021-10-01 ENCOUNTER — OFFICE VISIT (OUTPATIENT)
Dept: ONCOLOGY | Facility: CLINIC | Age: 36
End: 2021-10-01

## 2021-10-01 ENCOUNTER — LAB (OUTPATIENT)
Dept: LAB | Facility: HOSPITAL | Age: 36
End: 2021-10-01

## 2021-10-01 VITALS
DIASTOLIC BLOOD PRESSURE: 78 MMHG | SYSTOLIC BLOOD PRESSURE: 130 MMHG | HEART RATE: 79 BPM | OXYGEN SATURATION: 99 % | RESPIRATION RATE: 16 BRPM | TEMPERATURE: 97.3 F | HEIGHT: 66 IN | WEIGHT: 217.3 LBS | BODY MASS INDEX: 34.92 KG/M2

## 2021-10-01 DIAGNOSIS — K92.2 GASTROINTESTINAL HEMORRHAGE, UNSPECIFIED GASTROINTESTINAL HEMORRHAGE TYPE: ICD-10-CM

## 2021-10-01 DIAGNOSIS — D50.9 IRON DEFICIENCY ANEMIA, UNSPECIFIED IRON DEFICIENCY ANEMIA TYPE: Primary | ICD-10-CM

## 2021-10-01 LAB
ALBUMIN SERPL-MCNC: 4 G/DL (ref 3.5–5.2)
ALBUMIN/GLOB SERPL: 1.6 G/DL
ALP SERPL-CCNC: 62 U/L (ref 39–117)
ALT SERPL W P-5'-P-CCNC: 13 U/L (ref 1–33)
ANION GAP SERPL CALCULATED.3IONS-SCNC: 8 MMOL/L (ref 5–15)
AST SERPL-CCNC: 14 U/L (ref 1–32)
BASOPHILS # BLD AUTO: 0.04 10*3/MM3 (ref 0–0.2)
BASOPHILS NFR BLD AUTO: 0.6 % (ref 0–1.5)
BILIRUB SERPL-MCNC: 0.5 MG/DL (ref 0–1.2)
BUN SERPL-MCNC: 16 MG/DL (ref 6–20)
BUN/CREAT SERPL: 23.9 (ref 7–25)
CALCIUM SPEC-SCNC: 9 MG/DL (ref 8.6–10.5)
CHLORIDE SERPL-SCNC: 106 MMOL/L (ref 98–107)
CO2 SERPL-SCNC: 28 MMOL/L (ref 22–29)
CREAT SERPL-MCNC: 0.67 MG/DL (ref 0.57–1)
DEPRECATED RDW RBC AUTO: 55.6 FL (ref 37–54)
EOSINOPHIL # BLD AUTO: 0.1 10*3/MM3 (ref 0–0.4)
EOSINOPHIL NFR BLD AUTO: 1.6 % (ref 0.3–6.2)
ERYTHROCYTE [DISTWIDTH] IN BLOOD BY AUTOMATED COUNT: 17 % (ref 12.3–15.4)
FERRITIN SERPL-MCNC: 29.32 NG/ML (ref 13–150)
GFR SERPL CREATININE-BSD FRML MDRD: 120 ML/MIN/1.73
GLOBULIN UR ELPH-MCNC: 2.5 GM/DL
GLUCOSE SERPL-MCNC: 84 MG/DL (ref 65–99)
HCT VFR BLD AUTO: 38.2 % (ref 34–46.6)
HGB BLD-MCNC: 12.2 G/DL (ref 12–15.9)
HOLD SPECIMEN: NORMAL
IMM GRANULOCYTES # BLD AUTO: 0.02 10*3/MM3 (ref 0–0.05)
IMM GRANULOCYTES NFR BLD AUTO: 0.3 % (ref 0–0.5)
IRON 24H UR-MRATE: 29 MCG/DL (ref 37–145)
IRON SATN MFR SERPL: 7 % (ref 20–50)
LYMPHOCYTES # BLD AUTO: 2.64 10*3/MM3 (ref 0.7–3.1)
LYMPHOCYTES NFR BLD AUTO: 42.5 % (ref 19.6–45.3)
MCH RBC QN AUTO: 28.3 PG (ref 26.6–33)
MCHC RBC AUTO-ENTMCNC: 31.9 G/DL (ref 31.5–35.7)
MCV RBC AUTO: 88.6 FL (ref 79–97)
MONOCYTES # BLD AUTO: 0.58 10*3/MM3 (ref 0.1–0.9)
MONOCYTES NFR BLD AUTO: 9.3 % (ref 5–12)
NEUTROPHILS NFR BLD AUTO: 2.83 10*3/MM3 (ref 1.7–7)
NEUTROPHILS NFR BLD AUTO: 45.7 % (ref 42.7–76)
NRBC BLD AUTO-RTO: 0 /100 WBC (ref 0–0.2)
PLATELET # BLD AUTO: 257 10*3/MM3 (ref 140–450)
PMV BLD AUTO: 9.4 FL (ref 6–12)
POTASSIUM SERPL-SCNC: 4 MMOL/L (ref 3.5–5.2)
PROT SERPL-MCNC: 6.5 G/DL (ref 6–8.5)
RBC # BLD AUTO: 4.31 10*6/MM3 (ref 3.77–5.28)
SODIUM SERPL-SCNC: 142 MMOL/L (ref 136–145)
TIBC SERPL-MCNC: 405 MCG/DL (ref 298–536)
TRANSFERRIN SERPL-MCNC: 272 MG/DL (ref 200–360)
WBC # BLD AUTO: 6.21 10*3/MM3 (ref 3.4–10.8)

## 2021-10-01 PROCEDURE — 80053 COMPREHEN METABOLIC PANEL: CPT

## 2021-10-01 PROCEDURE — 83540 ASSAY OF IRON: CPT

## 2021-10-01 PROCEDURE — 85025 COMPLETE CBC W/AUTO DIFF WBC: CPT

## 2021-10-01 PROCEDURE — 84466 ASSAY OF TRANSFERRIN: CPT

## 2021-10-01 PROCEDURE — 99214 OFFICE O/P EST MOD 30 MIN: CPT | Performed by: NURSE PRACTITIONER

## 2021-10-01 PROCEDURE — 82728 ASSAY OF FERRITIN: CPT

## 2021-10-01 PROCEDURE — 36415 COLL VENOUS BLD VENIPUNCTURE: CPT

## 2021-10-01 RX ORDER — SODIUM CHLORIDE 9 MG/ML
250 INJECTION, SOLUTION INTRAVENOUS ONCE
Status: CANCELLED | OUTPATIENT
Start: 2021-10-15

## 2021-10-01 RX ORDER — DIPHENHYDRAMINE HYDROCHLORIDE 50 MG/ML
50 INJECTION INTRAMUSCULAR; INTRAVENOUS AS NEEDED
Status: CANCELLED | OUTPATIENT
Start: 2021-10-08

## 2021-10-01 RX ORDER — DIPHENHYDRAMINE HYDROCHLORIDE 50 MG/ML
50 INJECTION INTRAMUSCULAR; INTRAVENOUS AS NEEDED
Status: CANCELLED | OUTPATIENT
Start: 2021-10-22

## 2021-10-01 RX ORDER — DIPHENHYDRAMINE HYDROCHLORIDE 50 MG/ML
25 INJECTION INTRAMUSCULAR; INTRAVENOUS ONCE
Status: CANCELLED | OUTPATIENT
Start: 2021-10-15

## 2021-10-01 RX ORDER — DIPHENHYDRAMINE HYDROCHLORIDE 50 MG/ML
25 INJECTION INTRAMUSCULAR; INTRAVENOUS ONCE
Status: CANCELLED | OUTPATIENT
Start: 2021-10-08

## 2021-10-01 RX ORDER — DIPHENHYDRAMINE HYDROCHLORIDE 50 MG/ML
25 INJECTION INTRAMUSCULAR; INTRAVENOUS ONCE
Status: CANCELLED | OUTPATIENT
Start: 2021-10-22

## 2021-10-01 RX ORDER — SODIUM CHLORIDE 9 MG/ML
250 INJECTION, SOLUTION INTRAVENOUS ONCE
Status: CANCELLED | OUTPATIENT
Start: 2021-10-08

## 2021-10-01 RX ORDER — DIPHENHYDRAMINE HYDROCHLORIDE 50 MG/ML
50 INJECTION INTRAMUSCULAR; INTRAVENOUS AS NEEDED
Status: CANCELLED | OUTPATIENT
Start: 2021-10-15

## 2021-10-01 RX ORDER — FAMOTIDINE 10 MG/ML
20 INJECTION, SOLUTION INTRAVENOUS AS NEEDED
Status: CANCELLED | OUTPATIENT
Start: 2021-10-08

## 2021-10-01 RX ORDER — FAMOTIDINE 10 MG/ML
20 INJECTION, SOLUTION INTRAVENOUS AS NEEDED
Status: CANCELLED | OUTPATIENT
Start: 2021-10-22

## 2021-10-01 RX ORDER — FAMOTIDINE 10 MG/ML
20 INJECTION, SOLUTION INTRAVENOUS AS NEEDED
Status: CANCELLED | OUTPATIENT
Start: 2021-10-15

## 2021-10-01 RX ORDER — SODIUM CHLORIDE 9 MG/ML
250 INJECTION, SOLUTION INTRAVENOUS ONCE
Status: CANCELLED | OUTPATIENT
Start: 2021-10-22

## 2021-10-06 ENCOUNTER — OFFICE VISIT (OUTPATIENT)
Age: 36
End: 2021-10-06

## 2021-10-06 ENCOUNTER — PATIENT MESSAGE (OUTPATIENT)
Dept: GASTROENTEROLOGY | Age: 36
End: 2021-10-06

## 2021-10-06 DIAGNOSIS — Z11.59 SCREENING FOR VIRAL DISEASE: Primary | ICD-10-CM

## 2021-10-06 LAB — SARS-COV-2, PCR: NOT DETECTED

## 2021-10-06 PROCEDURE — 99999 PR OFFICE/OUTPT VISIT,PROCEDURE ONLY: CPT | Performed by: NURSE PRACTITIONER

## 2021-10-07 ENCOUNTER — ANESTHESIA EVENT (OUTPATIENT)
Dept: ENDOSCOPY | Age: 36
End: 2021-10-07
Payer: COMMERCIAL

## 2021-10-07 ENCOUNTER — HOSPITAL ENCOUNTER (OUTPATIENT)
Age: 36
Setting detail: OUTPATIENT SURGERY
Discharge: HOME | End: 2021-10-07
Attending: INTERNAL MEDICINE | Admitting: INTERNAL MEDICINE
Payer: COMMERCIAL

## 2021-10-07 ENCOUNTER — TELEPHONE (OUTPATIENT)
Dept: GASTROENTEROLOGY | Age: 36
End: 2021-10-07

## 2021-10-07 ENCOUNTER — ANESTHESIA (OUTPATIENT)
Dept: ENDOSCOPY | Age: 36
End: 2021-10-07
Payer: COMMERCIAL

## 2021-10-07 VITALS
SYSTOLIC BLOOD PRESSURE: 97 MMHG | RESPIRATION RATE: 14 BRPM | OXYGEN SATURATION: 100 % | DIASTOLIC BLOOD PRESSURE: 52 MMHG

## 2021-10-07 VITALS
RESPIRATION RATE: 18 BRPM | SYSTOLIC BLOOD PRESSURE: 99 MMHG | DIASTOLIC BLOOD PRESSURE: 75 MMHG | HEART RATE: 62 BPM | HEIGHT: 66 IN | BODY MASS INDEX: 34.55 KG/M2 | OXYGEN SATURATION: 100 % | WEIGHT: 215 LBS | TEMPERATURE: 97.7 F

## 2021-10-07 DIAGNOSIS — R93.5 ABNORMAL MRI, PELVIS: Primary | ICD-10-CM

## 2021-10-07 PROCEDURE — 2709999900 HC NON-CHARGEABLE SUPPLY: Performed by: INTERNAL MEDICINE

## 2021-10-07 PROCEDURE — 2580000003 HC RX 258: Performed by: INTERNAL MEDICINE

## 2021-10-07 PROCEDURE — 3700000001 HC ADD 15 MINUTES (ANESTHESIA): Performed by: INTERNAL MEDICINE

## 2021-10-07 PROCEDURE — 7100000010 HC PHASE II RECOVERY - FIRST 15 MIN: Performed by: INTERNAL MEDICINE

## 2021-10-07 PROCEDURE — 7100000011 HC PHASE II RECOVERY - ADDTL 15 MIN: Performed by: INTERNAL MEDICINE

## 2021-10-07 PROCEDURE — 3700000000 HC ANESTHESIA ATTENDED CARE: Performed by: INTERNAL MEDICINE

## 2021-10-07 PROCEDURE — 6360000002 HC RX W HCPCS: Performed by: NURSE ANESTHETIST, CERTIFIED REGISTERED

## 2021-10-07 PROCEDURE — 88305 TISSUE EXAM BY PATHOLOGIST: CPT

## 2021-10-07 PROCEDURE — 3609010300 HC COLONOSCOPY W/BIOPSY SINGLE/MULTIPLE: Performed by: INTERNAL MEDICINE

## 2021-10-07 PROCEDURE — 43239 EGD BIOPSY SINGLE/MULTIPLE: CPT | Performed by: INTERNAL MEDICINE

## 2021-10-07 PROCEDURE — 2500000003 HC RX 250 WO HCPCS: Performed by: NURSE ANESTHETIST, CERTIFIED REGISTERED

## 2021-10-07 PROCEDURE — 45380 COLONOSCOPY AND BIOPSY: CPT | Performed by: INTERNAL MEDICINE

## 2021-10-07 PROCEDURE — 3609012400 HC EGD TRANSORAL BIOPSY SINGLE/MULTIPLE: Performed by: INTERNAL MEDICINE

## 2021-10-07 RX ORDER — PROPOFOL 10 MG/ML
INJECTION, EMULSION INTRAVENOUS PRN
Status: DISCONTINUED | OUTPATIENT
Start: 2021-10-07 | End: 2021-10-07 | Stop reason: SDUPTHER

## 2021-10-07 RX ORDER — FERROUS SULFATE 220 (44)/5
220 ELIXIR ORAL PRN
COMMUNITY

## 2021-10-07 RX ORDER — LIDOCAINE HYDROCHLORIDE 10 MG/ML
INJECTION, SOLUTION INFILTRATION; PERINEURAL PRN
Status: DISCONTINUED | OUTPATIENT
Start: 2021-10-07 | End: 2021-10-07 | Stop reason: SDUPTHER

## 2021-10-07 RX ORDER — SODIUM CHLORIDE, SODIUM LACTATE, POTASSIUM CHLORIDE, CALCIUM CHLORIDE 600; 310; 30; 20 MG/100ML; MG/100ML; MG/100ML; MG/100ML
INJECTION, SOLUTION INTRAVENOUS CONTINUOUS
Status: DISCONTINUED | OUTPATIENT
Start: 2021-10-07 | End: 2021-10-07 | Stop reason: HOSPADM

## 2021-10-07 RX ADMIN — LIDOCAINE HYDROCHLORIDE 50 MG: 10 INJECTION, SOLUTION INFILTRATION; PERINEURAL at 08:13

## 2021-10-07 RX ADMIN — PROPOFOL 50 MG: 10 INJECTION, EMULSION INTRAVENOUS at 08:13

## 2021-10-07 RX ADMIN — SODIUM CHLORIDE, POTASSIUM CHLORIDE, SODIUM LACTATE AND CALCIUM CHLORIDE: 600; 310; 30; 20 INJECTION, SOLUTION INTRAVENOUS at 07:54

## 2021-10-07 RX ADMIN — PROPOFOL 140 MCG/KG/MIN: 10 INJECTION, EMULSION INTRAVENOUS at 08:14

## 2021-10-07 ASSESSMENT — PAIN - FUNCTIONAL ASSESSMENT: PAIN_FUNCTIONAL_ASSESSMENT: 0-10

## 2021-10-07 ASSESSMENT — PAIN SCALES - GENERAL
PAINLEVEL_OUTOF10: 0
PAINLEVEL_OUTOF10: 0

## 2021-10-07 NOTE — H&P
Patient Name: Soraya Blair  : 1985  MRN: [id number]  DATE: 10/07/21    Allergies: No Known Allergies     ENDOSCOPY  History and Physical    Procedure:    [x] Diagnostic Colonoscopy       [] Screening Colonoscopy  [x] EGD      [] ERCP      [] EUS       [] Other    [x] Previous office notes/History and Physical reviewed from the patients chart. Please see EMR for further details of HPI. I have examined the patient's status immediately prior to the procedure and:      Indications/HPI: For both EGD and colonoscopy exams today:    1. Iron deficiency anemia secondary to inadequate dietary iron intake          2. Abdominal cramping          3. BRBPR (bright red blood per rectum)     4. Nausea and vomiting, intractability of vomiting not specified, unspecified vomiting type     5. MRI pelvis 2021 in care everywhere:  Impression     1. Diffuse thickening of the rectum and anus extending about 11 cm in   length. This is favored to represent a diffuse inflammatory process. Consideration could be given to inflammatory bowel disease such as   surveyed of colitis, infection or potentially inflammation due to   foreign body insertion.      []Abdominal Pain   []Cancer- GI/Lung     []Fhx of colon CA/polyps  []History of Polyps  []Barretts            []Melena  []Abnormal Imaging              []Dysphagia              []Persistent Pneumonia   []Anemia                            []Food Impaction        []History of Polyps  [] GI Bleed             []Pulmonary nodule/Mass   []Change in bowel habits []Heartburn/Reflux  []Rectal Bleed (BRBPR)  []Chest Pain - Non Cardiac []Heme (+) Stool []Ulcers  []Constipation  []Hemoptysis  []Varices  []Diarrhea  []Hypoxemia    []Nausea/Vomiting   []Screening   []Crohns/Colitis  []Other:     Anesthesia:   [x] MAC [] Moderate Sedation   [] General   [] None     ROS: 12 pt Review of Symptoms was negative unless mentioned above    Medications:   Prior to Admission medications    Medication Sig Start Date End Date Taking? Authorizing Provider   ferrous sulfate 220 (44 Fe) MG/5ML solution Take 220 mg by mouth daily   Yes Historical Provider, MD   dexamethasone (DECADRON) 1 MG tablet 2 times daily 8/28/21   Historical Provider, MD   pantoprazole (PROTONIX) 40 MG tablet daily 3/18/21   Historical Provider, MD       Past Medical History:  Past Medical History:   Diagnosis Date    Anemia     Colitis     autoimmune    GERD (gastroesophageal reflux disease)     Hemorrhoids     History of blood transfusion     Rectal bleed     Stomach pain        Past Surgical History:  Past Surgical History:   Procedure Laterality Date    COLONOSCOPY  06/10/2021    [place]    COLONOSCOPY  05/21/2019    Dr Daksha Call:  nonbleeding internal hemorrhoids otherwise normal (repeat at age 46)   [address]  03/18/2021    Dr Beatris Lino GERD per patient     UPPER GASTROINTESTINAL ENDOSCOPY  06/17/2020    Dr Daksha Call       Social History:  Social History     Tobacco Use    Smoking status: Never Smoker    Smokeless tobacco: Never Used   Vaping Use    Vaping Use: Never used   Substance Use Topics    Alcohol use: Yes     Comment: two to three times weekly     Drug use: Never       Vital Signs:   Vitals:    10/07/21 0731   BP: 121/78   Pulse: 87   Resp: 22   Temp: 97.7 °F (36.5 °C)   SpO2: 100%        Physical Exam:  Cardiac:  [x]WNL  []Comments:  Pulmonary:  [x]WNL   []Comments:  Neuro/Mental Status:  [x]WNL  []Comments:  Abdominal:  [x]WNL    []Comments:  Other:   []WNL  []Comments:    Informed Consent:  The risks and benefits of the procedure have been discussed with either the patient or if they cannot consent, their representative. Assessment:  Patient examined and appropriate for planned sedation and procedure. Plan:  Proceed with planned sedation and procedure as above.          Dalila Villalba MD

## 2021-10-07 NOTE — OP NOTE
Endoscopic Procedure Note    Patient: Dylan Cherry : 1985  Med Rec#: [id number] Acc#: [id number]     Primary Care Provider Ben Morrison DO    Endoscopist: Soy Perry MD, MD    Date of Procedure:  10/7/2021    Procedure:   1. EGD with cold biopsies    Indications: For both EGD and colonoscopy exams today:    1. Iron deficiency anemia secondary to inadequate dietary iron intake          2. Abdominal cramping          3. BRBPR (bright red blood per rectum)     4. Nausea and vomiting, intractability of vomiting not specified, unspecified vomiting type     5. MRI pelvis 2021 in care everywhere:  Impression     1. Diffuse thickening of the rectum and anus extending about 11 cm in   length. This is favored to represent a diffuse inflammatory process. Consideration could be given to inflammatory bowel disease such as   surveyed of colitis, infection or potentially inflammation due to   foreign body insertion. Anesthesia:  Sedation was administered by anesthesia who monitored the patient during the procedure. Estimated Blood Loss: minimal    Procedure:   After reviewing the patient's chart and obtaining informed consent, the patient was placed in the left lateral decubitus position. A forward-viewing Olympus endoscope was lubricated and inserted through the mouth into the oropharynx. Under direct visualization, the upper esophagus was intubated. The scope was advanced to the level of the third portion of duodenum. Scope was slowly withdrawn with careful inspection of the mucosal surfaces. The scope was retroflexed for inspection of the gastric fundus and incisura. Findings and maneuvers are listed in impression below. The patient tolerated the procedure well. The scope was removed. There were no immediate complications. Findings/IMPRESSION:  Esophagus: normal upper two thirds. Small 1 to 2 mm erosions in the distal esophagus at the EG junction at 40 cm.  NO erosions or ulcers or nodules or strictures or webs or rings or mass lesions or extrinsic compression or diverticula noted. There is no obvious hiatal hernia present. Stomach:  normal without any mucosal changes suggestive of gastritis or atrophy. NO ulcers or masses or gastric outlet obstruction or retained food or fluid. Rugae were normal and lumen distended well with insufflation. Retroflexed views otherwise revealed a normal GE junction, fundus and cardia as well. Duodenum: Normal. Random biopsies were taken to check for Celiac disease and other causes of villous atrophy. RECOMMENDATIONS:    1. Await path results, the patient will be contacted in 7-10 days with biopsy results. 2.  Continue iron supplementation periodically  3. Monitor CBC with differential periodically    The results were discussed with the patient and family. A copy of the images obtained were given to the patient.      Pavithra Bhagat MD, MD  10/7/2021  8:18 AM

## 2021-10-07 NOTE — ANESTHESIA POSTPROCEDURE EVALUATION
Department of Anesthesiology  Postprocedure Note    Patient: Ninoska Amezcua  MRN: [id number]  YOB: 1985  Date of evaluation: 10/7/2021  Time:  8:48 AM     Procedure Summary     Date: 10/07/21 Room / Location: [address]    Anesthesia Start: 8413 Anesthesia Stop: 0848    Procedures:       EGD BIOPSY (N/A Abdomen)      COLONOSCOPY WITH BIOPSY (N/A ) Diagnosis: (ABD PAIN, RB, ANEMIA)    Surgeons: Isauro Jennings MD Responsible Provider: ROB Wellington CRNA    Anesthesia Type: MAC ASA Status: 2          Anesthesia Type: MAC    Stella Phase I:      Stella Phase II:      Last vitals: Reviewed and per EMR flowsheets.        Anesthesia Post Evaluation    Patient location during evaluation: bedside  Level of consciousness: awake and alert  Pain score: 0  Airway patency: patent  Nausea & Vomiting: no nausea and no vomiting  Complications: no  Cardiovascular status: blood pressure returned to baseline  Respiratory status: acceptable  Hydration status: euvolemic

## 2021-10-07 NOTE — TELEPHONE ENCOUNTER
10-07-21 Per Dr Cleone Denver note     Consider repeat MRI of the pelvis given the previous abnormal MRI findings. If a repeat MRI reveals persistent rectal wall abnormalities, patient may require additional evaluation including rectal EUS. Patient has an appointment on 11-18-21 with [facility] APR[facility]       Do you want to discuss at next appointment or have done prior to the apt?      Routed to [facility] APRN

## 2021-10-07 NOTE — ANESTHESIA PRE PROCEDURE
Department of Anesthesiology  Preprocedure Note       Name:  Obinna Francis   Age:  38 y.o.  :  1985                                          MRN:  [id number]         Date:  10/7/2021      Surgeon: Reta Garcia):  Marilyn Barahona MD    Procedure: Procedure(s):  EGD BIOPSY  COLONOSCOPY DIAGNOSTIC    Medications prior to admission:   Prior to Admission medications    Medication Sig Start Date End Date Taking? Authorizing Provider   dexamethasone (DECADRON) 1 MG tablet 2 times daily 21   Historical Provider, MD   dexamethasone (DECADRON) 2 MG tablet every other day  21   Historical Provider, MD   pantoprazole (PROTONIX) 40 MG tablet daily 3/18/21   Historical Provider, MD   medroxyPROGESTERone (DEPO-PROVERA) 150 MG/ML injection Inject 150 mg into the muscle every 3 months    Historical Provider, MD       Current medications:    Current Facility-Administered Medications   Medication Dose Route Frequency Provider Last Rate Last Admin    lactated ringers infusion   IntraVENous Continuous Marilyn Barahona MD           Allergies:  No Known Allergies    Problem List:  There is no problem list on file for this patient. Past Medical History:  No past medical history on file. Past Surgical History:        Procedure Laterality Date    COLON[place]COPY  06/10/2021    [place]    COLONOSCOPY  2019    Dr Jonnie Lay:  nonbleeding internal hemorrhoids otherwise normal (repeat at age 46)   [address]  2021    Dr Ansley Patel GERD per patient     UPPER GASTROINTESTINAL ENDOSCOPY  2020    Dr Andrew Good History:    Social History     Tobacco Use    Smoking status: Never Smoker    Smokeless tobacco: Never Used   Substance Use Topics    Alcohol use: Yes     Comment: two to three times weekly                                 Counseling given: Not Answered      Vital Signs (Current): There were no vitals filed for this visit. BP Readings from Last 3 Encounters:   09/10/21 112/68   06/03/17 110/80       NPO Status:                                                                                 BMI:   Wt Readings from Last 3 Encounters:   09/10/21 215 lb (97.5 kg)   06/03/17 205 lb (93 kg)     There is no height or weight on file to calculate BMI.    CBC: No results found for: WBC, RBC, HGB, HCT, MCV, RDW, PLT    CMP:   Lab Results   Component Value Date    PROT 7.2 06/03/2017    BILITOT 0.9 06/03/2017    ALKPHOS 57 06/03/2017    AST 14 06/03/2017    ALT 10 06/03/2017       POC Tests: No results for input(s): POCGLU, POCNA, POCK, POCCL, POCBUN, POCHEMO, POCHCT in the last 72 hours. Coags: No results found for: PROTIME, INR, APTT    HCG (If Applicable): No results found for: PREGTESTUR, PREGSERUM, HCG, HCGQUANT     ABGs: No results found for: PHART, PO2ART, BPI7GUF, HKP1DFT, BEART, C4KLXDAQ     Type & Screen (If Applicable):  No results found for: LABABO, LABRH    Drug/Infectious Status (If Applicable):  No results found for: HIV, HEPCAB    COVID-19 Screening (If Applicable):   Lab Results   Component Value Date    COVID19 Not Detected 10/06/2021           Anesthesia Evaluation    Airway: Mallampati: II  TM distance: >3 FB   Neck ROM: full  Mouth opening: > = 3 FB Dental:          Pulmonary: breath sounds clear to auscultation                             Cardiovascular:            Rhythm: regular  Rate: normal                    Neuro/Psych:               GI/Hepatic/Renal:             Endo/Other:                     Abdominal:       Abdomen: soft. Vascular: Other Findings:             Anesthesia Plan      MAC     ASA 2       Induction: intravenous. Anesthetic plan and risks discussed with patient.                       ROB Barclay - JOSEFINA   10/7/2021

## 2021-10-08 ENCOUNTER — INFUSION (OUTPATIENT)
Dept: ONCOLOGY | Facility: HOSPITAL | Age: 36
End: 2021-10-08

## 2021-10-08 VITALS
BODY MASS INDEX: 35.03 KG/M2 | RESPIRATION RATE: 16 BRPM | SYSTOLIC BLOOD PRESSURE: 107 MMHG | TEMPERATURE: 98.1 F | DIASTOLIC BLOOD PRESSURE: 61 MMHG | HEART RATE: 75 BPM | WEIGHT: 218 LBS | HEIGHT: 66 IN | OXYGEN SATURATION: 100 %

## 2021-10-08 DIAGNOSIS — D50.9 IRON DEFICIENCY ANEMIA, UNSPECIFIED IRON DEFICIENCY ANEMIA TYPE: Primary | ICD-10-CM

## 2021-10-08 PROCEDURE — 96376 TX/PRO/DX INJ SAME DRUG ADON: CPT

## 2021-10-08 PROCEDURE — 25010000002 HYDROCORTISONE SODIUM SUCCINATE 100 MG RECONSTITUTED SOLUTION: Performed by: NURSE PRACTITIONER

## 2021-10-08 PROCEDURE — 96375 TX/PRO/DX INJ NEW DRUG ADDON: CPT

## 2021-10-08 PROCEDURE — 96365 THER/PROPH/DIAG IV INF INIT: CPT

## 2021-10-08 PROCEDURE — 25010000002 IRON SUCROSE PER 1 MG: Performed by: NURSE PRACTITIONER

## 2021-10-08 RX ORDER — FAMOTIDINE 10 MG/ML
20 INJECTION, SOLUTION INTRAVENOUS AS NEEDED
Status: DISCONTINUED | OUTPATIENT
Start: 2021-10-08 | End: 2021-10-08 | Stop reason: HOSPADM

## 2021-10-08 RX ORDER — DIPHENHYDRAMINE HYDROCHLORIDE 50 MG/ML
25 INJECTION INTRAMUSCULAR; INTRAVENOUS ONCE
Status: DISCONTINUED | OUTPATIENT
Start: 2021-10-08 | End: 2021-10-08 | Stop reason: HOSPADM

## 2021-10-08 RX ORDER — DIPHENHYDRAMINE HYDROCHLORIDE 50 MG/ML
50 INJECTION INTRAMUSCULAR; INTRAVENOUS AS NEEDED
Status: DISCONTINUED | OUTPATIENT
Start: 2021-10-08 | End: 2021-10-08 | Stop reason: HOSPADM

## 2021-10-08 RX ORDER — SODIUM CHLORIDE 9 MG/ML
250 INJECTION, SOLUTION INTRAVENOUS ONCE
Status: COMPLETED | OUTPATIENT
Start: 2021-10-08 | End: 2021-10-08

## 2021-10-08 RX ADMIN — SODIUM CHLORIDE 250 ML: 9 INJECTION, SOLUTION INTRAVENOUS at 11:57

## 2021-10-08 RX ADMIN — HYDROCORTISONE SODIUM SUCCINATE 100 MG: 100 INJECTION, POWDER, FOR SOLUTION INTRAMUSCULAR; INTRAVENOUS at 12:01

## 2021-10-08 RX ADMIN — IRON SUCROSE 200 MG: 20 INJECTION, SOLUTION INTRAVENOUS at 12:13

## 2021-10-14 ENCOUNTER — HOSPITAL ENCOUNTER (OUTPATIENT)
Dept: MRI IMAGING | Age: 36
Discharge: HOME | End: 2021-10-14
Payer: COMMERCIAL

## 2021-10-14 DIAGNOSIS — R93.5 ABNORMAL MRI, PELVIS: ICD-10-CM

## 2021-10-14 PROCEDURE — 72197 MRI PELVIS W/O & W/DYE: CPT

## 2021-10-14 PROCEDURE — 6360000004 HC RX CONTRAST MEDICATION: Performed by: NURSE PRACTITIONER

## 2021-10-14 PROCEDURE — A9577 INJ MULTIHANCE: HCPCS | Performed by: NURSE PRACTITIONER

## 2021-10-14 RX ADMIN — GADOBENATE DIMEGLUMINE 20 ML: 529 INJECTION, SOLUTION INTRAVENOUS at 15:42

## 2021-10-15 ENCOUNTER — TELEPHONE (OUTPATIENT)
Dept: GASTROENTEROLOGY | Age: 36
End: 2021-10-15

## 2021-10-15 ENCOUNTER — INFUSION (OUTPATIENT)
Dept: ONCOLOGY | Facility: HOSPITAL | Age: 36
End: 2021-10-15

## 2021-10-15 VITALS
HEART RATE: 76 BPM | RESPIRATION RATE: 14 BRPM | SYSTOLIC BLOOD PRESSURE: 115 MMHG | HEIGHT: 66 IN | WEIGHT: 215 LBS | BODY MASS INDEX: 34.55 KG/M2 | DIASTOLIC BLOOD PRESSURE: 60 MMHG | OXYGEN SATURATION: 99 % | TEMPERATURE: 97.6 F

## 2021-10-15 DIAGNOSIS — D50.9 IRON DEFICIENCY ANEMIA, UNSPECIFIED IRON DEFICIENCY ANEMIA TYPE: Primary | ICD-10-CM

## 2021-10-15 DIAGNOSIS — R93.3 ABNORMAL FINDING ON GI TRACT IMAGING: Primary | ICD-10-CM

## 2021-10-15 PROCEDURE — 96365 THER/PROPH/DIAG IV INF INIT: CPT

## 2021-10-15 PROCEDURE — 25010000002 IRON SUCROSE PER 1 MG: Performed by: FAMILY MEDICINE

## 2021-10-15 PROCEDURE — 96375 TX/PRO/DX INJ NEW DRUG ADDON: CPT

## 2021-10-15 PROCEDURE — 25010000002 HYDROCORTISONE SODIUM SUCCINATE 100 MG RECONSTITUTED SOLUTION: Performed by: NURSE PRACTITIONER

## 2021-10-15 RX ORDER — DIPHENHYDRAMINE HYDROCHLORIDE 50 MG/ML
50 INJECTION INTRAMUSCULAR; INTRAVENOUS AS NEEDED
Status: DISCONTINUED | OUTPATIENT
Start: 2021-10-15 | End: 2021-10-15 | Stop reason: HOSPADM

## 2021-10-15 RX ORDER — SODIUM CHLORIDE 9 MG/ML
250 INJECTION, SOLUTION INTRAVENOUS ONCE
Status: COMPLETED | OUTPATIENT
Start: 2021-10-15 | End: 2021-10-15

## 2021-10-15 RX ORDER — DIPHENHYDRAMINE HYDROCHLORIDE 50 MG/ML
25 INJECTION INTRAMUSCULAR; INTRAVENOUS ONCE
Status: DISCONTINUED | OUTPATIENT
Start: 2021-10-15 | End: 2021-10-15 | Stop reason: HOSPADM

## 2021-10-15 RX ORDER — FAMOTIDINE 10 MG/ML
20 INJECTION, SOLUTION INTRAVENOUS AS NEEDED
Status: DISCONTINUED | OUTPATIENT
Start: 2021-10-15 | End: 2021-10-15 | Stop reason: HOSPADM

## 2021-10-15 RX ADMIN — IRON SUCROSE 200 MG: 20 INJECTION, SOLUTION INTRAVENOUS at 11:26

## 2021-10-15 RX ADMIN — SODIUM CHLORIDE 250 ML: 9 INJECTION, SOLUTION INTRAVENOUS at 11:14

## 2021-10-15 RX ADMIN — HYDROCORTISONE SODIUM SUCCINATE 100 MG: 100 INJECTION, POWDER, FOR SOLUTION INTRAMUSCULAR; INTRAVENOUS at 11:21

## 2021-10-15 NOTE — TELEPHONE ENCOUNTER
We can recheck her pelvic MRI or CT-pelvis in 6-12 months; you can consider checking ESR, CRP and CEA

## 2021-10-15 NOTE — TELEPHONE ENCOUNTER
She has already been to [facility] and they couldn't find anything and she was referred here for a third opinion.

## 2021-10-15 NOTE — TELEPHONE ENCOUNTER
Please let pt know the MRI pelvis shows continued anorectal wall thickening. I spoke to Dr Miroslava Weller about this and he recommends rechecking pelvic MRI in 6 months, put her in recall for this. And let me get these labs and stool test please, orders placed.

## 2021-10-19 NOTE — TELEPHONE ENCOUNTER
10.19.21       PT HAS BEEN NOTIFIED OF RESULTS AND RECOMMENDATIONS. SHE WILL BE BY IN THE NEXT WK TO HAVE LABS COMPLETED. THANKS.

## 2021-10-22 ENCOUNTER — INFUSION (OUTPATIENT)
Dept: ONCOLOGY | Facility: HOSPITAL | Age: 36
End: 2021-10-22

## 2021-10-22 VITALS
HEIGHT: 66 IN | BODY MASS INDEX: 34.23 KG/M2 | HEART RATE: 80 BPM | SYSTOLIC BLOOD PRESSURE: 114 MMHG | TEMPERATURE: 96.9 F | RESPIRATION RATE: 18 BRPM | WEIGHT: 213 LBS | OXYGEN SATURATION: 97 % | DIASTOLIC BLOOD PRESSURE: 65 MMHG

## 2021-10-22 DIAGNOSIS — D50.9 IRON DEFICIENCY ANEMIA, UNSPECIFIED IRON DEFICIENCY ANEMIA TYPE: Primary | ICD-10-CM

## 2021-10-22 PROCEDURE — 96375 TX/PRO/DX INJ NEW DRUG ADDON: CPT

## 2021-10-22 PROCEDURE — 25010000002 IRON SUCROSE PER 1 MG: Performed by: NURSE PRACTITIONER

## 2021-10-22 PROCEDURE — 25010000002 HYDROCORTISONE SODIUM SUCCINATE 100 MG RECONSTITUTED SOLUTION: Performed by: NURSE PRACTITIONER

## 2021-10-22 PROCEDURE — 96365 THER/PROPH/DIAG IV INF INIT: CPT

## 2021-10-22 RX ORDER — DIPHENHYDRAMINE HYDROCHLORIDE 50 MG/ML
25 INJECTION INTRAMUSCULAR; INTRAVENOUS ONCE
Status: DISCONTINUED | OUTPATIENT
Start: 2021-10-22 | End: 2021-10-22 | Stop reason: HOSPADM

## 2021-10-22 RX ORDER — FAMOTIDINE 10 MG/ML
20 INJECTION, SOLUTION INTRAVENOUS AS NEEDED
Status: DISCONTINUED | OUTPATIENT
Start: 2021-10-22 | End: 2021-10-22 | Stop reason: HOSPADM

## 2021-10-22 RX ORDER — SODIUM CHLORIDE 9 MG/ML
250 INJECTION, SOLUTION INTRAVENOUS ONCE
Status: COMPLETED | OUTPATIENT
Start: 2021-10-22 | End: 2021-10-22

## 2021-10-22 RX ORDER — DIPHENHYDRAMINE HYDROCHLORIDE 50 MG/ML
50 INJECTION INTRAMUSCULAR; INTRAVENOUS AS NEEDED
Status: DISCONTINUED | OUTPATIENT
Start: 2021-10-22 | End: 2021-10-22 | Stop reason: HOSPADM

## 2021-10-22 RX ADMIN — HYDROCORTISONE SODIUM SUCCINATE 100 MG: 100 INJECTION, POWDER, FOR SOLUTION INTRAMUSCULAR; INTRAVENOUS at 11:07

## 2021-10-22 RX ADMIN — IRON SUCROSE 200 MG: 20 INJECTION, SOLUTION INTRAVENOUS at 11:33

## 2021-10-22 RX ADMIN — SODIUM CHLORIDE 250 ML: 9 INJECTION, SOLUTION INTRAVENOUS at 11:07

## 2021-11-05 DIAGNOSIS — R93.3 ABNORMAL FINDING ON GI TRACT IMAGING: ICD-10-CM

## 2021-11-05 LAB
C-REACTIVE PROTEIN: <0.3 MG/DL (ref 0–0.5)
CEA: 1 NG/ML (ref 0–4.7)
SEDIMENTATION RATE, ERYTHROCYTE: 16 MM/HR (ref 0–20)

## 2021-11-08 ENCOUNTER — TELEPHONE (OUTPATIENT)
Dept: GASTROENTEROLOGY | Age: 36
End: 2021-11-08

## 2021-11-11 LAB — CALPROTECTIN, FECAL: 392 UG/G

## 2021-11-12 NOTE — TELEPHONE ENCOUNTER
The stool calprotectin is elevated, Dr Ronan Day wants to keep the plan of repeat MRI in 6 months from previous, she should be in recall for this, make sure she is

## 2021-11-18 ENCOUNTER — TELEPHONE (OUTPATIENT)
Dept: GASTROENTEROLOGY | Age: 36
End: 2021-11-18

## 2021-11-18 ENCOUNTER — OFFICE VISIT (OUTPATIENT)
Dept: GASTROENTEROLOGY | Age: 36
End: 2021-11-18
Payer: COMMERCIAL

## 2021-11-18 VITALS
SYSTOLIC BLOOD PRESSURE: 116 MMHG | HEART RATE: 88 BPM | OXYGEN SATURATION: 99 % | BODY MASS INDEX: 35.3 KG/M2 | DIASTOLIC BLOOD PRESSURE: 64 MMHG | HEIGHT: 66 IN | WEIGHT: 219.63 LBS

## 2021-11-18 DIAGNOSIS — K62.5 BRBPR (BRIGHT RED BLOOD PER RECTUM): ICD-10-CM

## 2021-11-18 DIAGNOSIS — R93.5 ABNORMAL MRI, PELVIS: Primary | ICD-10-CM

## 2021-11-18 PROCEDURE — 99213 OFFICE O/P EST LOW 20 MIN: CPT | Performed by: NURSE PRACTITIONER

## 2021-11-18 ASSESSMENT — ENCOUNTER SYMPTOMS
ANAL BLEEDING: 1
COUGH: 0
SHORTNESS OF BREATH: 0
DIARRHEA: 0
ABDOMINAL PAIN: 1
CONSTIPATION: 0
ABDOMINAL DISTENTION: 0
VOICE CHANGE: 0
TROUBLE SWALLOWING: 0
VOMITING: 0
BACK PAIN: 0
NAUSEA: 0
RECTAL PAIN: 1

## 2021-11-18 NOTE — TELEPHONE ENCOUNTER
Pt was seen today, 11/18/21, by Padmini Torres and she wanted patient to, \"F/u with Dr Fuad Howard next week for continued GI problems. \"    I told Padmini Torres that Dr Magdalena Ellis is out of the office next week and I got the pt scheduled for Dr Erica Gomez next available day/time just to get her on the books. Padmini Torres said to ask Mauricio Rai if she could ask Dr Magdalena Ellis, if we can work this patient in somewhere on Nov 29th to get pt in sooner?

## 2021-11-18 NOTE — TELEPHONE ENCOUNTER
With her complicated history and paucity of GI findings, continued intermittent BRBPR by history but not corroborated by endoscopic findings, f/u at the IBD/GI clinic at [facility] or at [facility] maybe the best option for this patient at this time. A repeat pelvic MRI can be scheduled here based on recommendations/second opinion from MDs  at a academic/tertiary center. Thanks.

## 2021-11-18 NOTE — PROGRESS NOTES
Subjective: Mahesh Cabrales is a34 y.o. female  Chief Complaint   Patient presents with    Follow-up     per Dr Rachana Esquivel       HPI  PCP: Jarret Price DO  Referring Provider: Dr Rachana Esquivel MD  Pt made an appt s/p EGD and colonoscopy as advised by Dr Bala Chang  To discuss results  Denies post-procedural complications  Results of scopes in history, we reviewed everything  She also had a repeat pelvic MRI 10/2021 for re-evaluation of rectum area where there was abnormality noted on MRI in 9/2021. Results noted below. Dr Bala Chang recommended repeating another pelvic MRI in 6 months  Impression   Similar appearing continuous circumferential anorectal wall thickening   extending from the anus into the low and mid rectum. Leading   differential is a chronic indolent inflammatory or infectious process,   including ulcerative colitis. Correlate with clinical history and   prior colonoscopy reports. Pt reports she c/o rectal bleeding streaming out of rectum, so much its like urine but its rectal bleeding from the rectum. Which occurs intermittently. Reports severe anorectal pain with a rectal exam.  She has a multitude of questions regarding all of this and is tearful. Because she has been to 3 GIs (here, [facility], [facility]) and no explanation for her symptoms or abnormal MRI. She also reports her hematologist at [facility] told her that the anorectal thickening is causing everything else in her pelvis to be obscured and this is causing pressure on her stomach. She also reports to me again today that the only thing that has been helpful for her so far is daily oral steroids (which was previously prescribed by her hematologist). Family HX:    Pt denies family hx of colon polyps, colon CA, inflammatory bowel dx, gastric CA and esophageal CA.     Past Medical History:   Diagnosis Date    Anemia     GERD (gastroesophageal reflux disease)     Hemorrhoids     History of blood transfusion     Rectal bleed     Stomach pain           Past Surgical History:   Procedure Laterality Date    COLONOSCOPY  06/10/2021    [facility]    COLONOSCOPY  05/21/2019    Dr Vance Plush:  nonbleeding internal hemorrhoids otherwise normal (repeat at age 46)    COLONOSCOPY N/A 10/07/2021    Dr Valencia Song, (-)mirco Colitis, Int hemorrhoids Grade 1 wo bleeding, 8 year recall   [employer] [facility]      UPPER GASTROINTESTINAL ENDOSCOPY  03/18/2021    Dr Veronica Fraser GERD per patient     UPPER GASTROINTESTINAL ENDOSCOPY  06/17/2020    Dr Kent Held 10/07/2021    Dr Valencia Song, Sm 1-2 mm erosions in distal esoph, (-)Sprue, sugg of gastritis or atrophy,       Social History     Socioeconomic History    Marital status:      Spouse name: None    Number of children: None    Years of education: None    Highest education level: None   Occupational History    None   Tobacco Use    Smoking status: Never Smoker    Smokeless tobacco: Never Used   Vaping Use    Vaping Use: Never used   Substance and Sexual Activity    Alcohol use: Yes     Comment: two to three times weekly     Drug use: Never    Sexual activity: None   Other Topics Concern    None   Social History Narrative    None     Social Determinants of Health     Financial Resource Strain:     Difficulty of Paying Living Expenses: Not on file   Food Insecurity:     Worried About Running Out of Food in the Last Year: Not on file    Coby of Food in the Last Year: Not on file   Transportation Needs:     Lack of Transportation (Medical): Not on file    Lack of Transportation (Non-Medical):  Not on file   Physical Activity:     Days of Exercise per Week: Not on file    Minutes of Exercise per Session: Not on file   Stress:     Feeling of Stress : Not on file   Social Connections:     Frequency of Communication with Friends and Family: Not on file    Frequency of Social Gatherings with Friends and Family: Not on file    Attends Anabaptism Services: Not on file    Active Member of Clubs or Organizations: Not on file    Attends Club or Organization Meetings: Not on file    Marital Status: Not on file   Intimate Partner Violence:     Fear of Current or Ex-Partner: Not on file    Emotionally Abused: Not on file    Physically Abused: Not on file    Sexually Abused: Not on file   Housing Stability:     Unable to Pay for Housing in the Last Year: Not on file    Number of [place] in the Last Year: Not on file    Unstable Housing in the Last Year: Not on file       No Known Allergies    Current Outpatient Medications   Medication Sig Dispense Refill    ferrous sulfate 220 (44 Fe) MG/5ML solution Take 220 mg by mouth as needed Every 3 days      pantoprazole (PROTONIX) 40 MG tablet 2 times daily        No current facility-administered medications for this visit. Review of Systems   Constitutional: Negative for fatigue and unexpected weight change. HENT: Negative for trouble swallowing and voice change. Respiratory: Negative for cough and shortness of breath. Cardiovascular: Negative for chest pain and palpitations. Gastrointestinal: Positive for abdominal pain, anal bleeding and rectal pain. Negative for abdominal distention, constipation, diarrhea, nausea and vomiting. Genitourinary: Negative for hematuria. Musculoskeletal: Negative for arthralgias, back pain and neck pain. Neurological: Negative for weakness and headaches. Psychiatric/Behavioral: Negative for dysphoric mood. The patient is not nervous/anxious. Objective:     Physical Exam  Vitals and nursing note reviewed. Constitutional:       Appearance: She is well-developed. Comments: /64   Pulse 88   Ht 5' 6\" (1.676 m)   Wt 219 lb 10.1 oz (99.6 kg)   SpO2 99%   BMI 35.45 kg/m²    Eyes:      General: No scleral icterus. Conjunctiva/sclera: Conjunctivae normal.      Pupils: Pupils are equal, round, and reactive to light. Neck:      Thyroid: No thyromegaly. Cardiovascular:      Rate and Rhythm: Normal rate and regular rhythm. Heart sounds: Normal heart sounds. No murmur heard. No friction rub. No gallop. Pulmonary:      Effort: Pulmonary effort is normal. No respiratory distress. Breath sounds: Normal breath sounds. Abdominal:      General: Bowel sounds are normal. There is no distension. Palpations: Abdomen is soft. Tenderness: There is no abdominal tenderness. There is no rebound. Musculoskeletal:         General: No deformity. Normal range of motion. Cervical back: Normal range of motion and neck supple. Neurological:      Mental Status: She is alert and oriented to person, place, and time. Cranial Nerves: No cranial nerve deficit. Psychiatric:         Judgment: Judgment normal.           Assessment:       Diagnosis Orders   1. Abnormal MRI, pelvis     2. BRBPR (bright red blood per rectum)           Plan:      1.  Pt is in recall for repeat pelvic MRI in 4/2022.  2. F/u with Dr Franchesca Waterman for further recommendations regarding her continued GI complaints

## 2021-11-29 NOTE — TELEPHONE ENCOUNTER
Panda Mao, please make referral to tertiary care center as noted below if [facility] didn't do this.  Thank you

## 2021-12-01 DIAGNOSIS — D50.9 IRON DEFICIENCY ANEMIA, UNSPECIFIED IRON DEFICIENCY ANEMIA TYPE: Primary | ICD-10-CM

## 2021-12-03 ENCOUNTER — LAB (OUTPATIENT)
Dept: LAB | Facility: HOSPITAL | Age: 36
End: 2021-12-03

## 2021-12-03 ENCOUNTER — OFFICE VISIT (OUTPATIENT)
Dept: ONCOLOGY | Facility: CLINIC | Age: 36
End: 2021-12-03

## 2021-12-03 VITALS
WEIGHT: 221.2 LBS | RESPIRATION RATE: 16 BRPM | TEMPERATURE: 97.7 F | OXYGEN SATURATION: 98 % | BODY MASS INDEX: 35.55 KG/M2 | HEIGHT: 66 IN | DIASTOLIC BLOOD PRESSURE: 70 MMHG | HEART RATE: 82 BPM | SYSTOLIC BLOOD PRESSURE: 132 MMHG

## 2021-12-03 DIAGNOSIS — K62.5 BRBPR (BRIGHT RED BLOOD PER RECTUM): Primary | ICD-10-CM

## 2021-12-03 DIAGNOSIS — D50.9 IRON DEFICIENCY ANEMIA, UNSPECIFIED IRON DEFICIENCY ANEMIA TYPE: Primary | ICD-10-CM

## 2021-12-03 DIAGNOSIS — R93.5 ABNORMAL MRI, PELVIS: ICD-10-CM

## 2021-12-03 LAB
ALBUMIN SERPL-MCNC: 4.2 G/DL (ref 3.5–5.2)
ALBUMIN/GLOB SERPL: 2 G/DL
ALP SERPL-CCNC: 61 U/L (ref 39–117)
ALT SERPL W P-5'-P-CCNC: 14 U/L (ref 1–33)
ANION GAP SERPL CALCULATED.3IONS-SCNC: 7 MMOL/L (ref 5–15)
AST SERPL-CCNC: 16 U/L (ref 1–32)
BASOPHILS # BLD AUTO: 0.02 10*3/MM3 (ref 0–0.2)
BASOPHILS NFR BLD AUTO: 0.4 % (ref 0–1.5)
BILIRUB SERPL-MCNC: 1.5 MG/DL (ref 0–1.2)
BUN SERPL-MCNC: 11 MG/DL (ref 6–20)
BUN/CREAT SERPL: 25 (ref 7–25)
CALCIUM SPEC-SCNC: 9.3 MG/DL (ref 8.6–10.5)
CHLORIDE SERPL-SCNC: 104 MMOL/L (ref 98–107)
CO2 SERPL-SCNC: 27 MMOL/L (ref 22–29)
CREAT SERPL-MCNC: 0.44 MG/DL (ref 0.57–1)
DEPRECATED RDW RBC AUTO: 41.2 FL (ref 37–54)
EOSINOPHIL # BLD AUTO: 0.06 10*3/MM3 (ref 0–0.4)
EOSINOPHIL NFR BLD AUTO: 1.3 % (ref 0.3–6.2)
ERYTHROCYTE [DISTWIDTH] IN BLOOD BY AUTOMATED COUNT: 12.4 % (ref 12.3–15.4)
FERRITIN SERPL-MCNC: 78.35 NG/ML (ref 13–150)
FOLATE SERPL-MCNC: 11.3 NG/ML (ref 4.78–24.2)
GFR SERPL CREATININE-BSD FRML MDRD: >150 ML/MIN/1.73
GLOBULIN UR ELPH-MCNC: 2.1 GM/DL
GLUCOSE SERPL-MCNC: 92 MG/DL (ref 65–99)
HCT VFR BLD AUTO: 37.7 % (ref 34–46.6)
HGB BLD-MCNC: 12.2 G/DL (ref 12–15.9)
HOLD SPECIMEN: NORMAL
IMM GRANULOCYTES # BLD AUTO: 0.02 10*3/MM3 (ref 0–0.05)
IMM GRANULOCYTES NFR BLD AUTO: 0.4 % (ref 0–0.5)
IRON 24H UR-MRATE: 112 MCG/DL (ref 37–145)
IRON SATN MFR SERPL: 32 % (ref 20–50)
LYMPHOCYTES # BLD AUTO: 1.4 10*3/MM3 (ref 0.7–3.1)
LYMPHOCYTES NFR BLD AUTO: 29.9 % (ref 19.6–45.3)
MCH RBC QN AUTO: 29.3 PG (ref 26.6–33)
MCHC RBC AUTO-ENTMCNC: 32.4 G/DL (ref 31.5–35.7)
MCV RBC AUTO: 90.4 FL (ref 79–97)
MONOCYTES # BLD AUTO: 0.56 10*3/MM3 (ref 0.1–0.9)
MONOCYTES NFR BLD AUTO: 12 % (ref 5–12)
NEUTROPHILS NFR BLD AUTO: 2.62 10*3/MM3 (ref 1.7–7)
NEUTROPHILS NFR BLD AUTO: 56 % (ref 42.7–76)
NRBC BLD AUTO-RTO: 0 /100 WBC (ref 0–0.2)
PLATELET # BLD AUTO: 239 10*3/MM3 (ref 140–450)
PMV BLD AUTO: 9.7 FL (ref 6–12)
POTASSIUM SERPL-SCNC: 4 MMOL/L (ref 3.5–5.2)
PROT SERPL-MCNC: 6.3 G/DL (ref 6–8.5)
RBC # BLD AUTO: 4.17 10*6/MM3 (ref 3.77–5.28)
SODIUM SERPL-SCNC: 138 MMOL/L (ref 136–145)
TIBC SERPL-MCNC: 349 MCG/DL (ref 298–536)
TRANSFERRIN SERPL-MCNC: 234 MG/DL (ref 200–360)
VIT B12 BLD-MCNC: 543 PG/ML (ref 211–946)
WBC NRBC COR # BLD: 4.68 10*3/MM3 (ref 3.4–10.8)

## 2021-12-03 PROCEDURE — 99214 OFFICE O/P EST MOD 30 MIN: CPT | Performed by: INTERNAL MEDICINE

## 2021-12-03 PROCEDURE — 82607 VITAMIN B-12: CPT

## 2021-12-03 PROCEDURE — 83540 ASSAY OF IRON: CPT

## 2021-12-03 PROCEDURE — 85025 COMPLETE CBC W/AUTO DIFF WBC: CPT

## 2021-12-03 PROCEDURE — 80053 COMPREHEN METABOLIC PANEL: CPT

## 2021-12-03 PROCEDURE — 82746 ASSAY OF FOLIC ACID SERUM: CPT

## 2021-12-03 PROCEDURE — 36415 COLL VENOUS BLD VENIPUNCTURE: CPT

## 2021-12-03 PROCEDURE — 84466 ASSAY OF TRANSFERRIN: CPT

## 2021-12-03 PROCEDURE — 82728 ASSAY OF FERRITIN: CPT | Performed by: INTERNAL MEDICINE

## 2021-12-03 RX ORDER — PANTOPRAZOLE SODIUM 40 MG/1
40 TABLET, DELAYED RELEASE ORAL 2 TIMES DAILY
Qty: 60 TABLET | Refills: 3 | Status: SHIPPED | OUTPATIENT
Start: 2021-12-03 | End: 2022-03-01

## 2021-12-03 NOTE — TELEPHONE ENCOUNTER
PT HAS BEEN SCHEDULED W/ Mariaelena Sullivan @ [facility] FOR  TUES. 12-07-21 @ [address] [address] [address], [address] [address]  [phone]    PT IS AWARE OF THIS APPT. SHE IS GOING TO CALL TO SEE IF THEY CAN DO A TELEHEALTH VISIT. RECORDS FAXED [phone]  CONFIRMATION ATTACHED TO THIS ENCOUNTER. ALSO FAXED A REQUEST TO RADIOLOGY TO SEND A COPY OF THE MRI ON A [address] TO BRANDY DERAS.

## 2021-12-03 NOTE — TELEPHONE ENCOUNTER
Last visit [facility] 11-18-21. Egd/Cln  10-7-21. FU with Mary Dougherty is scheduled 12-20-21 for continued GI issues. Patient called today from [phone] said she is just about out of her Pantoprazole 40 mg Bid for her Naomi Eugene and is requesting a RF please, said her Naomi Eugene can get really bad without this medication. I will forward to  and  cma in the absence of [facility].  [facility]

## 2021-12-03 NOTE — PROGRESS NOTES
[facility]  [address]  [place] [address]  [phone]       12/03/2021     PROGRESS NOTE     PATIENT NAME: Ratna Flor  YOB: 1985  35 y.o. MR: [id number]    PCP: Danelle Chapa,      CC: Here for follow-up regarding iron deficiency anemia    HPI:   Ms. Ratna Flor is a very pleasant 35-year-old white female with history of iron deficiency and anemia associated with chronic hematochezia.  She has had extensive GI evaluation and so far there is no diagnosis regarding the etiology of the hematochezia.  I explained to the patient that there is no evidence of a primary hematological disorder but that she has had severe iron deficiency anemia secondary to the chronic hematochezia.  We reviewed that today the CBC is normal, the serum iron level is normal so there is no indication for parenteral iron at this time.    I advised the patient that as long as she has chronic hematochezia it is possible that iron deficiency anemia will recur and if the ferritin levels drop before she becomes anemic parenteral iron would be indicated.  She is taking ferrous sulfate but she cannot tolerate more than 1 tab every third day.  I told her that if she could try taking it on Mondays, Wednesdays and Fridays.    We reviewed the GI bleeding history.  She has had hematochezia since about March 2019. Has had it at least once weekly, sometimes 3x/week. Most of the times with BM, sometimes blood alone, unable to quantitate.  She showed a picture of bright red blood in the toilet water. She has at least daily cramps in AM, sometimes the whole day.    I explained that the we will continue to treat the anemia but we do not have the expertise to treat the hematochezia therefore she should continue to follow-up with gastroenterology and continue to have the hematochezia addressed by gastroenterology.    PAST  HISTORY:     HEME/ONC HISTORY  Oncology/Hematology History Overview Note     HEME/ONC DX/RX/INVESTIGATIONS SUMMARY:   DX:   Iron deficiency anemia secondary to chronic hematochezia, started around 3/2019.    12/3/2021, so far the cause of the hematochezia has not been established. She remains with daily GI cramping and hematochezia from once to 3x weekly.    RX:  ferrous sulfate every 3rd day (unable to tolerate more).  Venofer: 200 mg doses in 2021 on: 3/17, 3/18, 6/22, 7/28, 7/29, 10/8, 10/15, 10/22.    OTHER INFO:   admitted to [facility] on 3/15/2021.  She had a CT scan of abdomen that showed intestinal hemorrhage and her hemoglobin was 4.4.  Fecal occult blood stools was positive.  She received 4 units of packed red blood cells and 2 iron infusions of Venofer 200 mg each:    INVESTIGATIONS:   10/7/2021, EGD ([facility]):  Findings/IMPRESSION:  Esophagus: normal upper two thirds. Small 1 to 2 mm erosions in the distal esophagus at the EG junction at 40 cm. NO erosions or ulcers or nodules or strictures or webs or rings or mass lesions or extrinsic compression or diverticula noted.   There is no obvious hiatal hernia present.   Stomach: normal without any mucosal changes suggestive of gastritis or atrophy.  NO ulcers or masses or gastric outlet obstruction or retained food or fluid. Rugae were normal and lumen distended well with insufflation. Retroflexed views otherwise revealed a normal GE junction, fundus and cardia as well.  Duodenum: Normal. Random biopsies were taken to check for Celiac disease and other causes of villous atrophy.    [email or web address]  Impression:   1. Diffuse thickening of the rectum and anus extending about 11 cm in   length. This is favored to represent a diffuse inflammatory process.   Consideration could be given to inflammatory bowel disease such as   surveyed of colitis, infection or potentially inflammation due to   foreign body insertion.     10/7/2021,  PATH:  FINAL DIAGNOSIS:   A.  Small intestine, random duodenal biopsies: Benign duodenal mucosa   with patchy mild chronic nonspecific inflammation, negative for evidence   of sprue.   B.  Colon, random colonic biopsies: Benign colonic mucosa with no   significant histopathologic changes.        MEDICAL HISTORY   has a past medical history of Anxiety, GERD (gastroesophageal reflux disease), Hemorrhoids, and IBS (irritable bowel syndrome).   HEALTH MAINTENANCE ITEMS  Health Maintenance Due   Topic Date Due   • ANNUAL PHYSICAL  Never done   • COVID-19 Vaccine (1) Never done   • HEPATITIS C SCREENING  Never done   • PAP SMEAR  Never done     <no information>  Last Completed Colonoscopy     This patient has no relevant Health Maintenance data.        There is no immunization history for the selected administration types on file for this patient.  Last Completed Mammogram     This patient has no relevant Health Maintenance data.        FAMILY HISTORY  Family History   Problem Relation Age of Onset   • Heart defect Mother    • No Known Problems Father    • Hypertension Maternal Grandmother    • No Known Problems Maternal Grandfather    • No Known Problems Paternal Grandmother    • Prostate cancer Paternal Grandfather    • Pancreatic cancer Paternal Grandfather    • Colon cancer Neg Hx    • Colon polyps Neg Hx    • Esophageal cancer Neg Hx    • Liver cancer Neg Hx    • Stomach cancer Neg Hx    • Rectal cancer Neg Hx    • Liver disease Neg Hx        Cancer-related family history includes Pancreatic cancer in her paternal grandfather; Prostate cancer in her paternal grandfather. There is no history of Colon cancer, Esophageal cancer, Liver cancer, Stomach cancer, or Rectal cancer.   SURGICAL HISTORY   has a past surgical history that includes [place] tooth extraction; Colonoscopy (N/A, 5/22/2019); Esophagogastroduodenoscopy (N/A, 6/17/2020); Esophagogastroduodenoscopy (N/A, 3/18/2021); Colonoscopy (N/A, 3/18/2021); and  "Capsule Endoscopy (N/A, 4/16/2021).  SOCIAL HISTORY  Social History     Socioeconomic History   • Marital status:    Tobacco Use   • Smoking status: Former Smoker     Types: Cigarettes   • Smokeless tobacco: Never Used   Vaping Use   • Vaping Use: Never used   Substance and Sexual Activity   • Alcohol use: Yes     Comment: Occasionally   • Drug use: No   • Sexual activity: Defer     MEDICATIONS:     Current Outpatient Medications   Medication Instructions   • ferrous sulfate (FERROUSUL) 325 mg, Oral, Daily With Breakfast   • ondansetron ODT (ZOFRAN-ODT) 8 mg, Translingual, Every 8 Hours PRN   • pantoprazole (PROTONIX) 40 MG EC tablet TAKE 1 TABLET BY MOUTH EVERY DAY      ALLERGIES:   No Known Allergies  ROS:   Pertinent positive and negative findings as noted in HPI.   PHYSICAL EXAM:   /70   Pulse 82   Temp 97.7 °F (36.5 °C)   Resp 16   Ht 167.6 cm (65.98\")   Wt 100 kg (221 lb 3.2 oz)   SpO2 98%   Breastfeeding No   BMI 35.72 kg/m²  Body surface area is 2.08 meters squared.   Pain Score    12/03/21 0755   PainSc: 0-No pain     Alert, oriented x3, cooperative, not in distress.  HEENT: Normocephalic, wearing a facemask.  Lungs: No tachypnea.   Extremities: No ankle edema.  Neurologic: Moves all extremities.    INVESTIGATIONS/LABS:     Lab Results - Last 18 Months   Lab Units 12/03/21  0732 10/01/21  0707 08/02/21  0959 07/19/21  1046 07/02/21  0934 06/16/21  0732 03/18/21  0336 03/17/21  0346 03/16/21  0246 03/15/21  1855 03/15/21  1613 03/15/21  1613   WBC 10*3/mm3 4.68 6.21 6.32 7.21 6.33 4.56   < > 4.77   < > 6.47   < > 6.65   HEMOGLOBIN g/dL 12.2 12.2 11.9* 11.3* 11.0* 11.3*   < > 8.5*   < > 4.4*   < > 4.6*   HEMATOCRIT % 37.7 38.2 36.6 35.9 34.1 35.5   < > 28.2*   < > 16.5*   < > 17.4*   MCV fL 90.4 88.6 83.4 83.3 81.8 82.6   < > 68.4*   < > 60.4*   < > 60.8*   PLATELETS 10*3/mm3 239 257 238 206 241 220   < > 385   < > 463*   < > 488*   IMM GRAN % % 0.4 0.3 1.3* 0.3 0.2 0.2  --   --   --   -- "   --   --    NEUTROS ABS 10*3/mm3 2.62 2.83 4.36 5.53 4.73 2.70   < > 2.89   < > 4.92   < > 5.00   LYMPHS ABS 10*3/mm3 1.40 2.64 1.31 1.15 1.03 1.24   < >  --    < >  --   --   --    MONOS ABS 10*3/mm3 0.56 0.58 0.52 0.47 0.49 0.50   < >  --    < >  --   --   --    EOS ABS 10*3/mm3 0.06 0.10 0.03 0.02 0.04 0.08   < > 0.14   < > 0.06   < >  --    BASOS ABS 10*3/mm3 0.02 0.04 0.02 0.02 0.03 0.03   < > 0.05   < > 0.13   < >  --    IMMATURE GRANS (ABS) 10*3/mm3 0.02 0.02 0.08* 0.02 0.01 0.01  --   --   --   --   --   --    NRBC /100 WBC 0.0 0.0 0.0 0.0 0.0 0.0  --  2.0*  --   --   --   --    NEUTROPHIL % %  --   --   --   --   --   --   --  60.6  --  76.0  --  75.2   MONOCYTES % %  --   --   --   --   --   --   --  6.1  --  3.0*  --  6.9   BASOPHIL % %  --   --   --   --   --   --   --  1.0  --  2.0*  --   --    ATYP LYMPH % %  --   --   --   --   --   --   --  1.0  --   --   --   --    ANISOCYTOSIS   --   --   --   --   --   --   --  Slight/1+  --  Large/3+  --  Large/3+   GIANT PLT   --   --   --   --   --   --   --  Slight/1+  --  Slight/1+  --  Slight/1+    < > = values in this interval not displayed.       Lab Results - Last 18 Months   Lab Units 12/03/21  0732 10/01/21  0707 08/02/21  0959 07/19/21  1046 07/02/21  0934 05/19/21  1310 03/18/21  0336 03/17/21  0346 03/17/21  0346 03/16/21  0246 03/16/21  0246 03/15/21  1855 03/15/21  1855 03/15/21  1613 03/15/21  1613   BUN mg/dL 11 16 14 16 13 12 8   < > 9   < > 9   < > 9   < > 10   CREATININE mg/dL 0.44* 0.67 0.66 0.62 0.54* 0.60 0.57   < > 0.59   < > 0.61   < > 0.55*   < > 0.55*   GLUCOSE mg/dL 92 84 97 95 109* 86 96   < > 89   < > 94   < > 105*   < > 94   SODIUM mmol/L 138 142 139 139 132* 139 145   < > 140   < > 141   < > 140   < > 138   POTASSIUM mmol/L 4.0 4.0 3.7 3.9 4.1 4.2 3.8   < > 3.7   < > 4.0   < > 4.1   < > 4.2   CO2 mmol/L 27.0 28.0 26.0 26.0 26.0 24.0 23.0   < > 25.0   < > 24.0   < > 24.0   < > 23.0   CHLORIDE mmol/L 104 106 103 104 101 104 111*   <  > 104   < > 107   < > 106   < > 105   ANION GAP mmol/L 7.0 8.0 10.0 9.0 5.0 11.0 11.0   < > 11.0   < > 10.0   < > 10.0   < > 10.0   BUN / CREAT RATIO  25.0 23.9 21.2 25.8* 24.1 20.0 14.0   < > 15.3   < > 14.8   < > 16.4   < > 18.2   CALCIUM mg/dL 9.3 9.0 9.4 9.2 9.4 9.2 8.7   < > 8.9   < > 9.1   < > 9.2   < > 9.5   EGFR IF NONAFRICN AM mL/min/1.73  --   --   --   --   --   --  119  --  115  --  110  --  124  --  124   ALK PHOS U/L 61 62 51 52 55 60  --   --   --   --   --    < > 46   < > 48   TOTAL PROTEIN g/dL 6.3 6.5 6.6 6.9 6.8 7.5  --   --   --   --   --    < > 6.9   < > 6.9   ALT (SGPT) U/L 14 13 16 14 9 10  --   --   --   --   --    < > 10   < > 9   AST (SGOT) U/L 16 14 14 15 12 18  --   --   --   --   --    < > 18   < > 16   BILIRUBIN mg/dL 1.5* 0.5 1.0 1.5* 1.0 0.9  --   --   --   --   --    < > 0.9   < > 0.8   ALBUMIN g/dL 4.20 4.00 4.30 4.60 4.10 4.30  --   --   --   --   --    < > 4.30   < > 4.40   GLOBULIN gm/dL 2.1 2.5 2.3 2.3 2.7 3.2  --   --   --   --   --    < > 2.6   < > 2.5    < > = values in this interval not displayed.       Lab Results - Last 18 Months   Lab Units 11/05/21  0819 05/19/21  1310   CEA ng/mL 1 1.05   REFERENCE LAB REPORT   --  See Attached Result   See Attached Report  See Attached Report       Lab Results - Last 18 Months   Lab Units 12/03/21  0732 10/01/21  0707 08/02/21  0959 07/19/21  1046 07/02/21  0934 05/27/21  1006 05/19/21  1310 05/19/21  1310 03/15/21  2030 03/15/21  1855   IRON mcg/dL 112 29* 60 28*  --  37  --  112  --    < >   TIBC mcg/dL 349 405 414 490  --  483  --  533  --    < >   IRON SATURATION % 32 7* 14* 6*  --  8*  --  21  --    < >   FERRITIN ng/mL 78.35 29.32 156.00* 9.57* 23.13 17.60   < > 14.82  --    < >   VITAMIN B 12 pg/mL  --   --   --   --   --   --   --  568 639  --    FOLATE ng/mL  --   --   --   --   --   --   --  16.00 16.20  --     < > = values in this interval not displayed.       No radiology results for the last 90 days.    ASSESSMENT:    Iron deficiency anemia secondary to chronic hematochezia.  The anemia is currently compensated.    Chronic hematochezia of unknown etiology but with abnormal imaging of the rectum and abnormal finding in colonoscopy but the rectal biopsy was negative.    PLAN/RECOMMENDATIONS:   Continue ferrous sulfate as discussed in HPI.    Venofer 500 mg IV in 6 weeks.    VST before Venofer in 6 weeks. Labs 1 day PTV    (if the labs do not show indication for Venofer, discontinue the order for Venofer for that visit and consider anticipating additional Venofer infusions in the future depending on how the patient progresses regarding the hematochezia, anemia and iron deficiency).     DX/ORDERS:   Diagnoses and all orders for this visit:    1. Iron deficiency anemia, unspecified iron deficiency anemia type (Primary)  -     CBC Auto Differential; Future  -     Comprehensive Metabolic Panel; Future  -     Iron Profile; Future  -     Ferritin; Future  -     Vitamin B12 & Folate; Future  -     Ferritin        Future Appointments   Date Time Provider Department Center   1/13/2022  2:35 PM [facility] CANCER CTR LAB [facility] CCL[facility]   1/14/2022  8:00 AM Goldberg, Amit, MD [facility]   1/14/2022  9:00 AM CHAIR 12 [facility] OP INFU ONC [facility] OION[facility]        Evans Brown MD  12/3/2021    cc: No ref. provider found, Danelle Chapa DO

## 2021-12-12 PROCEDURE — U0004 COV-19 TEST NON-CDC HGH THRU: HCPCS | Performed by: NURSE PRACTITIONER

## 2021-12-14 NOTE — TELEPHONE ENCOUNTER
Dr Ami Rodarte - Does patient still need the appt with you on 12/20/21 to discuss continued GI problems or should we cancel it?

## 2021-12-14 NOTE — TELEPHONE ENCOUNTER
I called the patient to give her the option to cx or move the appt to [place] and she got frustrated and stated that she hasn't been able to make down to [place]. She didn't state why.  She said that she wanted to keep the appt on the 20th with Dr Na Walker because shes been waiting to see him (I suppose since she [facility]'t make it [facility] back on 12/7/21.)

## 2021-12-14 NOTE — TELEPHONE ENCOUNTER
Patient needs to reschedule her appointment at [facility] and only after that appointment if necessary, she may benefit from follow-up in our GI clinic here based on any treatment recommendations recommendations made [facility].   At this time most of her work-up here has been nondiagnostic and hence we would not have any new recommendations pending her consultation visit with physicians at the tertiary [place]

## 2021-12-14 NOTE — TELEPHONE ENCOUNTER
The appointment here later this month can be canceled or postponed to Mid to late Jan (depending on what [employer] recommend and the patient's choice). Thanks.

## 2021-12-14 NOTE — TELEPHONE ENCOUNTER
12-14-21 Called and notified patient of recommendation as per Dr Arlyn Nascimento. Patient stated that she still had questions and does not understand why she needed to go to [facility]. Told patient that Dr Arlyn Nascimento, recommended that she go to [address] [place] [address]. Stated that As per Dr Arlyn Nascimento most of her work here had been non diagnostic and hence we would have no new recommendations at this time.

## 2021-12-16 DIAGNOSIS — K21.00 GASTROESOPHAGEAL REFLUX DISEASE WITH ESOPHAGITIS: ICD-10-CM

## 2021-12-16 RX ORDER — PANTOPRAZOLE SODIUM 40 MG/1
TABLET, DELAYED RELEASE ORAL
Qty: 90 TABLET | Refills: 1 | OUTPATIENT
Start: 2021-12-16

## 2022-01-13 ENCOUNTER — LAB (OUTPATIENT)
Dept: LAB | Facility: HOSPITAL | Age: 37
End: 2022-01-13

## 2022-01-13 DIAGNOSIS — D50.9 IRON DEFICIENCY ANEMIA, UNSPECIFIED IRON DEFICIENCY ANEMIA TYPE: ICD-10-CM

## 2022-01-13 LAB
ALBUMIN SERPL-MCNC: 4.4 G/DL (ref 3.5–5.2)
ALBUMIN/GLOB SERPL: 1.6 G/DL
ALP SERPL-CCNC: 67 U/L (ref 39–117)
ALT SERPL W P-5'-P-CCNC: 12 U/L (ref 1–33)
ANION GAP SERPL CALCULATED.3IONS-SCNC: 11 MMOL/L (ref 5–15)
AST SERPL-CCNC: 17 U/L (ref 1–32)
BILIRUB CONJ SERPL-MCNC: 0.2 MG/DL (ref 0–0.3)
BILIRUB SERPL-MCNC: 0.8 MG/DL (ref 0–1.2)
BUN SERPL-MCNC: 13 MG/DL (ref 6–20)
BUN/CREAT SERPL: 20 (ref 7–25)
CALCIUM SPEC-SCNC: 9.5 MG/DL (ref 8.6–10.5)
CHLORIDE SERPL-SCNC: 105 MMOL/L (ref 98–107)
CO2 SERPL-SCNC: 23 MMOL/L (ref 22–29)
CREAT SERPL-MCNC: 0.65 MG/DL (ref 0.57–1)
DEPRECATED RDW RBC AUTO: 37.1 FL (ref 37–54)
ERYTHROCYTE [DISTWIDTH] IN BLOOD BY AUTOMATED COUNT: 11.8 % (ref 12.3–15.4)
FERRITIN SERPL-MCNC: 54.53 NG/ML (ref 13–150)
GFR SERPL CREATININE-BSD FRML MDRD: 124 ML/MIN/1.73
GLOBULIN UR ELPH-MCNC: 2.7 GM/DL
GLUCOSE SERPL-MCNC: 91 MG/DL (ref 65–99)
HAV IGM SERPL QL IA: NORMAL
HBV CORE IGM SERPL QL IA: NORMAL
HBV SURFACE AG SERPL QL IA: NORMAL
HCT VFR BLD AUTO: 39 % (ref 34–46.6)
HCV AB SER DONR QL: NORMAL
HGB BLD-MCNC: 13 G/DL (ref 12–15.9)
HIV1+2 AB SER QL: NORMAL
IRON 24H UR-MRATE: 47 MCG/DL (ref 37–145)
IRON SATN MFR SERPL: 12 % (ref 20–50)
MCH RBC QN AUTO: 28.8 PG (ref 26.6–33)
MCHC RBC AUTO-ENTMCNC: 33.3 G/DL (ref 31.5–35.7)
MCV RBC AUTO: 86.3 FL (ref 79–97)
PLATELET # BLD AUTO: 255 10*3/MM3 (ref 140–450)
PMV BLD AUTO: 9.6 FL (ref 6–12)
POTASSIUM SERPL-SCNC: 4 MMOL/L (ref 3.5–5.2)
PROT SERPL-MCNC: 7.1 G/DL (ref 6–8.5)
RBC # BLD AUTO: 4.52 10*6/MM3 (ref 3.77–5.28)
SODIUM SERPL-SCNC: 139 MMOL/L (ref 136–145)
TIBC SERPL-MCNC: 381 MCG/DL (ref 298–536)
TRANSFERRIN SERPL-MCNC: 256 MG/DL (ref 200–360)
WBC NRBC COR # BLD: 4.88 10*3/MM3 (ref 3.4–10.8)

## 2022-01-13 PROCEDURE — 80074 ACUTE HEPATITIS PANEL: CPT

## 2022-01-13 PROCEDURE — 85027 COMPLETE CBC AUTOMATED: CPT

## 2022-01-13 PROCEDURE — 80053 COMPREHEN METABOLIC PANEL: CPT

## 2022-01-13 PROCEDURE — 84630 ASSAY OF ZINC: CPT

## 2022-01-13 PROCEDURE — 83540 ASSAY OF IRON: CPT

## 2022-01-13 PROCEDURE — 84466 ASSAY OF TRANSFERRIN: CPT

## 2022-01-13 PROCEDURE — 82525 ASSAY OF COPPER: CPT

## 2022-01-13 PROCEDURE — 36415 COLL VENOUS BLD VENIPUNCTURE: CPT

## 2022-01-13 PROCEDURE — 82248 BILIRUBIN DIRECT: CPT

## 2022-01-13 PROCEDURE — 82728 ASSAY OF FERRITIN: CPT

## 2022-01-13 PROCEDURE — G0432 EIA HIV-1/HIV-2 SCREEN: HCPCS

## 2022-01-14 ENCOUNTER — OFFICE VISIT (OUTPATIENT)
Dept: ONCOLOGY | Facility: CLINIC | Age: 37
End: 2022-01-14

## 2022-01-14 ENCOUNTER — INFUSION (OUTPATIENT)
Dept: ONCOLOGY | Facility: HOSPITAL | Age: 37
End: 2022-01-14

## 2022-01-14 VITALS
OXYGEN SATURATION: 96 % | DIASTOLIC BLOOD PRESSURE: 74 MMHG | TEMPERATURE: 97.6 F | BODY MASS INDEX: 35.26 KG/M2 | HEART RATE: 82 BPM | WEIGHT: 219.4 LBS | SYSTOLIC BLOOD PRESSURE: 128 MMHG | HEIGHT: 66 IN | RESPIRATION RATE: 18 BRPM

## 2022-01-14 VITALS
HEART RATE: 73 BPM | OXYGEN SATURATION: 100 % | SYSTOLIC BLOOD PRESSURE: 115 MMHG | TEMPERATURE: 97.5 F | WEIGHT: 219 LBS | DIASTOLIC BLOOD PRESSURE: 68 MMHG | HEIGHT: 66 IN | BODY MASS INDEX: 35.2 KG/M2 | RESPIRATION RATE: 18 BRPM

## 2022-01-14 DIAGNOSIS — D50.9 IRON DEFICIENCY ANEMIA, UNSPECIFIED IRON DEFICIENCY ANEMIA TYPE: Primary | ICD-10-CM

## 2022-01-14 DIAGNOSIS — E61.1 IRON DEFICIENCY: Primary | ICD-10-CM

## 2022-01-14 DIAGNOSIS — D50.9 IRON DEFICIENCY ANEMIA, UNSPECIFIED IRON DEFICIENCY ANEMIA TYPE: ICD-10-CM

## 2022-01-14 PROCEDURE — 96365 THER/PROPH/DIAG IV INF INIT: CPT

## 2022-01-14 PROCEDURE — 99215 OFFICE O/P EST HI 40 MIN: CPT | Performed by: INTERNAL MEDICINE

## 2022-01-14 PROCEDURE — 25010000002 IRON SUCROSE PER 1 MG: Performed by: INTERNAL MEDICINE

## 2022-01-14 RX ORDER — FAMOTIDINE 10 MG/ML
20 INJECTION, SOLUTION INTRAVENOUS AS NEEDED
Status: DISCONTINUED | OUTPATIENT
Start: 2022-01-14 | End: 2022-01-14 | Stop reason: HOSPADM

## 2022-01-14 RX ORDER — SODIUM CHLORIDE 9 MG/ML
250 INJECTION, SOLUTION INTRAVENOUS ONCE
Status: COMPLETED | OUTPATIENT
Start: 2022-01-14 | End: 2022-01-14

## 2022-01-14 RX ORDER — DIPHENHYDRAMINE HYDROCHLORIDE 50 MG/ML
50 INJECTION INTRAMUSCULAR; INTRAVENOUS AS NEEDED
Status: DISCONTINUED | OUTPATIENT
Start: 2022-01-14 | End: 2022-01-14 | Stop reason: HOSPADM

## 2022-01-14 RX ADMIN — SODIUM CHLORIDE 250 ML: 9 INJECTION, SOLUTION INTRAVENOUS at 09:45

## 2022-01-14 RX ADMIN — IRON SUCROSE 200 MG: 20 INJECTION, SOLUTION INTRAVENOUS at 09:57

## 2022-01-17 ENCOUNTER — TELEPHONE (OUTPATIENT)
Dept: ONCOLOGY | Facility: CLINIC | Age: 37
End: 2022-01-17

## 2022-01-17 NOTE — TELEPHONE ENCOUNTER
Called [facility] and updated pt new doctor. Dr. Goldberg will be taking over for Dr. Brown. I gave them Dr. HILLIARD's NPI to update.

## 2022-01-17 NOTE — TELEPHONE ENCOUNTER
Caller: JAYLENE    Relationship to patient: [employer]     Best call back number: [phone] OPT 6    JAYLENE NEEDS TO KNOW IF ANOTHER PROVIDER OTHER THAN DR. ZULETA IS PROVIDING PT WITH VENOFER IRON INFUSIONS BEFORE THEY ABORT THE REQUEST. THEY HAVE CALLED AND WE HAVE TOLD THEM DR. ZULETA IS NOT A CURRENT PROVIDER IN OUR OFFICE. THEY WANT TO VERIFY HER INFORMATION.    REF # [id number]   [id number]

## 2022-01-18 LAB
COPPER SERPL-MCNC: 92 UG/DL (ref 80–158)
ZINC SERPL-MCNC: 85 UG/DL (ref 44–115)

## 2022-03-01 RX ORDER — PANTOPRAZOLE SODIUM 40 MG/1
40 TABLET, DELAYED RELEASE ORAL
Qty: 90 TABLET | Refills: 1 | Status: SHIPPED | OUTPATIENT
Start: 2022-03-01 | End: 2022-09-13

## 2022-03-11 ENCOUNTER — TELEPHONE (OUTPATIENT)
Dept: ONCOLOGY | Facility: CLINIC | Age: 37
End: 2022-03-11

## 2022-03-11 NOTE — TELEPHONE ENCOUNTER
Caller: Ratna Flor    Relationship: Self    Best call back number: [phone]    What is the best time to reach you: ASAP    Who are you requesting to speak with (clinical staff, provider,  specific staff member): DR. GOLDBERG'S NURSE    Do you know the name of the person who called: RATNA    What was the call regarding: RATNA IS HAVING RECTUM BLEEDING & IS INQUIRING IF SHE CAN GET LABS DONE TO SEE WHAT HER LEVELS ARE.    Do you require a callback: YES, PLEASE

## 2022-03-13 DIAGNOSIS — D50.9 IRON DEFICIENCY ANEMIA, UNSPECIFIED IRON DEFICIENCY ANEMIA TYPE: Primary | ICD-10-CM

## 2022-03-13 DIAGNOSIS — E61.1 IRON DEFICIENCY: ICD-10-CM

## 2022-03-16 ENCOUNTER — LAB (OUTPATIENT)
Dept: LAB | Facility: HOSPITAL | Age: 37
End: 2022-03-16

## 2022-03-16 DIAGNOSIS — E61.1 IRON DEFICIENCY: ICD-10-CM

## 2022-03-16 DIAGNOSIS — D50.9 IRON DEFICIENCY ANEMIA, UNSPECIFIED IRON DEFICIENCY ANEMIA TYPE: ICD-10-CM

## 2022-03-16 LAB
ALBUMIN SERPL-MCNC: 4.5 G/DL (ref 3.5–5.2)
ALBUMIN/GLOB SERPL: 1.7 G/DL
ALP SERPL-CCNC: 59 U/L (ref 39–117)
ALT SERPL W P-5'-P-CCNC: 12 U/L (ref 1–33)
ANION GAP SERPL CALCULATED.3IONS-SCNC: 9 MMOL/L (ref 5–15)
AST SERPL-CCNC: 15 U/L (ref 1–32)
BASOPHILS # BLD AUTO: 0.03 10*3/MM3 (ref 0–0.2)
BASOPHILS NFR BLD AUTO: 0.7 % (ref 0–1.5)
BILIRUB SERPL-MCNC: 1 MG/DL (ref 0–1.2)
BUN SERPL-MCNC: 11 MG/DL (ref 6–20)
BUN/CREAT SERPL: 21.2 (ref 7–25)
CALCIUM SPEC-SCNC: 9.3 MG/DL (ref 8.6–10.5)
CHLORIDE SERPL-SCNC: 104 MMOL/L (ref 98–107)
CO2 SERPL-SCNC: 26 MMOL/L (ref 22–29)
CREAT SERPL-MCNC: 0.52 MG/DL (ref 0.57–1)
DEPRECATED RDW RBC AUTO: 39 FL (ref 37–54)
EGFRCR SERPLBLD CKD-EPI 2021: 122.1 ML/MIN/1.73
EOSINOPHIL # BLD AUTO: 0.12 10*3/MM3 (ref 0–0.4)
EOSINOPHIL NFR BLD AUTO: 2.7 % (ref 0.3–6.2)
ERYTHROCYTE [DISTWIDTH] IN BLOOD BY AUTOMATED COUNT: 12.2 % (ref 12.3–15.4)
FERRITIN SERPL-MCNC: 57.76 NG/ML (ref 13–150)
GLOBULIN UR ELPH-MCNC: 2.6 GM/DL
GLUCOSE SERPL-MCNC: 85 MG/DL (ref 65–99)
HCT VFR BLD AUTO: 40.2 % (ref 34–46.6)
HGB BLD-MCNC: 13.5 G/DL (ref 12–15.9)
IMM GRANULOCYTES # BLD AUTO: 0.01 10*3/MM3 (ref 0–0.05)
IMM GRANULOCYTES NFR BLD AUTO: 0.2 % (ref 0–0.5)
IRON 24H UR-MRATE: 112 MCG/DL (ref 37–145)
IRON SATN MFR SERPL: 28 % (ref 20–50)
LYMPHOCYTES # BLD AUTO: 1.65 10*3/MM3 (ref 0.7–3.1)
LYMPHOCYTES NFR BLD AUTO: 37 % (ref 19.6–45.3)
MCH RBC QN AUTO: 29.1 PG (ref 26.6–33)
MCHC RBC AUTO-ENTMCNC: 33.6 G/DL (ref 31.5–35.7)
MCV RBC AUTO: 86.6 FL (ref 79–97)
MONOCYTES # BLD AUTO: 0.46 10*3/MM3 (ref 0.1–0.9)
MONOCYTES NFR BLD AUTO: 10.3 % (ref 5–12)
NEUTROPHILS NFR BLD AUTO: 2.19 10*3/MM3 (ref 1.7–7)
NEUTROPHILS NFR BLD AUTO: 49.1 % (ref 42.7–76)
NRBC BLD AUTO-RTO: 0 /100 WBC (ref 0–0.2)
PLATELET # BLD AUTO: 273 10*3/MM3 (ref 140–450)
PMV BLD AUTO: 9.7 FL (ref 6–12)
POTASSIUM SERPL-SCNC: 4.3 MMOL/L (ref 3.5–5.2)
PROT SERPL-MCNC: 7.1 G/DL (ref 6–8.5)
RBC # BLD AUTO: 4.64 10*6/MM3 (ref 3.77–5.28)
SODIUM SERPL-SCNC: 139 MMOL/L (ref 136–145)
TIBC SERPL-MCNC: 396 MCG/DL (ref 298–536)
TRANSFERRIN SERPL-MCNC: 266 MG/DL (ref 200–360)
WBC NRBC COR # BLD: 4.46 10*3/MM3 (ref 3.4–10.8)

## 2022-03-16 PROCEDURE — 83540 ASSAY OF IRON: CPT

## 2022-03-16 PROCEDURE — 84466 ASSAY OF TRANSFERRIN: CPT

## 2022-03-16 PROCEDURE — 85025 COMPLETE CBC W/AUTO DIFF WBC: CPT

## 2022-03-16 PROCEDURE — 80053 COMPREHEN METABOLIC PANEL: CPT

## 2022-03-16 PROCEDURE — 82728 ASSAY OF FERRITIN: CPT

## 2022-03-16 PROCEDURE — 36415 COLL VENOUS BLD VENIPUNCTURE: CPT

## 2022-04-04 NOTE — PROGRESS NOTES
".a  [facility] GROUP HEMATOLOGY & ONCOLOGY  [address]  [place] [address] [phone]    Patient Name: Ratna Flor  Encounter Date: 04/18/2022  YOB: 1985  Patient Number: [id number]      Subjective     Subjective: 36 year old female who was initially seen by Dr. Torres and later seen by MARIA INES Murguia and Dr. Brown, all who have left the practice since, who was initially seen for iron deficiency anemia associated with chronic hematochezia. As per the previous notes, the patient has had extensive GI evaluation without a diagnosis regarding the etiology of the hematochezia. She was last seen in the office on 12/3/2021 at which time her serum iron levels were reportedly normal and there was reportedly no indication for parenteral iron at that time. Unfortunately, the patient is unable to tolerate more than 1 tablet of ferrous sulfate every third day and therefore had required IV iron in the past. She was asked to take 1 tablet on Monday, Wednesday and Friday during her last visit and she is able to tolerate that without issue.. As per the previous note from Dr. Torres, the patient was found to have a CT scan showing rectal thickening and had intermittent rectal bleeding with several colonoscopies that only showed hemorrhoids.     She states that she started getting lower abdominal pains which feel like \"food poisoning\" and BRBPR happened (has a history of hemorrhoids).  She went to urgent care and was found to have a Hb of 4 and she was found to have inflammation and she was started on steroids by Dr. Torres which was stopped thereafter. She had multiple colonoscopies which she was told her \"stool is elevated\" and MRI showed inflammation in the rectum and she was told she may have colitis but not given any treatments. She was referred to [facility] for further treatment evaluation but she had a difficult time getting done there. She had " a colonoscopy at [place] and was not told there was an issue either.     3.18.21 GI work up : 1. Random stomach, biopsy: Gastric mucosa with no significant pathologic changes. 2. Gastroesophageal junction, biopsies: Mild chronic inflammation. No evidence of intestinal metaplasia or dysplasia.  3. Rectum, biopsy: Mild hyperplastic changes with vascular congestion and focal lamina propria hemorrhage.    Iron infusions as well as transfusions to date:    3/16/2021  4 units packed red blood cells (approximately 1000 mg iron)   3/17/2021  Venofer 200 mg   3/18/2021  Venofer 200 mg   6/22/2021  Venofer 200 mg   10.8.21 Venofer 200 mg   10.15.21 Venofer 200 mg   10.22.21 Venofer 200 mg     She still has rectal BRBPR on and off, denies  any current signs of PICA syndrome.  She does eat meat on a regular basis. She still has normal menstrual periods, monthly for 3-4 days htat are heavy for 2 days only.     On follow-up on 4/18/2022: She is still having rectal bleeding on and off, she went back to [facility] and was told they are not certain what she has but is being treated like Inflammatory Bowel disease.      Status post 200 mg Venofer on 1/14/2022 to maintain supplies of iron.    Past Medical History:   Diagnosis Date   • GERD (gastroesophageal reflux disease)    • Hemorrhoids    • IBS (irritable bowel syndrome)        Oncology History:  Oncology/Hematology History Overview Note     HEME/ONC DX/RX/INVESTIGATIONS SUMMARY:   DX:   Iron deficiency anemia secondary to chronic hematochezia, started around 3/2019.    12/3/2021, so far the cause of the hematochezia has not been established. She remains with daily GI cramping and hematochezia from once to 3x weekly.    RX:  ferrous sulfate every 3rd day (unable to tolerate more).  Venofer: 200 mg doses in 2021 on: 3/17, 3/18, 6/22, 7/28, 7/29, 10/8, 10/15, 10/22.    OTHER INFO:   admitted to [facility] on 3/15/2021.  She had a CT scan of abdomen that showed intestinal hemorrhage  and her hemoglobin was 4.4.  Fecal occult blood stools was positive.  She received 4 units of packed red blood cells and 2 iron infusions of Venofer 200 mg each:    INVESTIGATIONS:   10/7/2021, EGD ([facility]):  Findings/IMPRESSION:  Esophagus: normal upper two thirds. Small 1 to 2 mm erosions in the distal esophagus at the EG junction at 40 cm. NO erosions or ulcers or nodules or strictures or webs or rings or mass lesions or extrinsic compression or diverticula noted.   There is no obvious hiatal hernia present.   Stomach: normal without any mucosal changes suggestive of gastritis or atrophy.  NO ulcers or masses or gastric outlet obstruction or retained food or fluid. Rugae were normal and lumen distended well with insufflation. Retroflexed views otherwise revealed a normal GE junction, fundus and cardia as well.  Duodenum: Normal. Random biopsies were taken to check for Celiac disease and other causes of villous atrophy.    10/7/2021, colonoscopy ([facility]):  Impression:   1. Diffuse thickening of the rectum and anus extending about 11 cm in   length. This is favored to represent a diffuse inflammatory process.   Consideration could be given to inflammatory bowel disease such as   surveyed of colitis, infection or potentially inflammation due to   foreign body insertion.     10/7/2021, PATH:  FINAL DIAGNOSIS:   A.  Small intestine, random duodenal biopsies: Benign duodenal mucosa   with patchy mild chronic nonspecific inflammation, negative for evidence   of sprue.   B.  Colon, random colonic biopsies: Benign colonic mucosa with no   significant histopathologic changes.          Past Surgical History:  Past Surgical History:   Procedure Laterality Date   • CAPSULE ENDOSCOPY N/A 4/16/2021    Procedure: CAPSULE ENDOSCOPY M2A;  Surgeon: Octavia Mattson MD;  Location: [facility] ENDOSCOPY;  Service: Gastroenterology;  Laterality: N/A; Normal exam.  No blood in lumen.  Capsule seen entering the colon.   •  COLONOSCOPY N/A 5/22/2019    Non-bleeding internal hemorrhoids; The examination was otherwise normal; No specimens collected; Repeat colonoscopy at age 48 for screening purposes   • COLONOSCOPY N/A 3/18/2021    Dr. Hernandez-The examined portion of the ileum was normal; Erythematous mucosa in the rectum-biopsied; Non-bleeding internal hemorrhoids   • ENDOSCOPY N/A 6/17/2020    LA Grade A reflux esophagitis; Normal stomach-biopsied; Normal examined duodenum   • ENDOSCOPY N/A 3/18/2021    Dr. Hernandez-Z-line irregular-biopsied; Normal stomach-biopsied; Normal first portion of the duodenum and second portion of the duodenum   • WISDOM TOOTH EXTRACTION        ___________________________________________________________________________________________________________________________________________________  Medications:   Outpatient Encounter Medications as of 4/18/2022   Medication Sig Dispense Refill   • ferrous sulfate (FerrouSul) 325 (65 FE) MG tablet Take 1 tablet by mouth Daily With Breakfast. (Patient taking differently: Take 325 mg by mouth Every 72 (Seventy-Two) Hours.)     • ondansetron ODT (ZOFRAN-ODT) 8 MG disintegrating tablet Place 1 tablet on the tongue Every 8 (Eight) Hours As Needed for Nausea or Vomiting. 15 tablet 0   • pantoprazole (PROTONIX) 40 MG EC tablet TAKE 1 TABLET BY MOUTH EVERY DAY 90 tablet 1     No facility-administered encounter medications on file as of 4/18/2022.     ___________________________________________________________________________________________________________________________________________________  Allergies:   No Known Allergies    Social/Family History:   Family History   Problem Relation Age of Onset   • Heart defect Mother    • No Known Problems Father    • Hypertension Maternal Grandmother    • No Known Problems Maternal Grandfather    • No Known Problems Paternal Grandmother    • Prostate cancer Paternal Grandfather    • Pancreatic cancer Paternal Grandfather    •  Colon cancer Neg Hx    • Colon polyps Neg Hx    • Esophageal cancer Neg Hx    • Liver cancer Neg Hx    • Stomach cancer Neg Hx    • Rectal cancer Neg Hx    • Liver disease Neg Hx         /Single/: , no kids by choice    Social History     Tobacco Use   • Smoking status: Former Smoker     Packs/day: 0.50     Years: 5.00     Pack years: 2.50     Types: Cigarettes     Quit date:      Years since quittin.2   • Smokeless tobacco: Never Used   Vaping Use   • Vaping Use: Never used   Substance Use Topics   • Alcohol use: Yes     Alcohol/week: 2.0 standard drinks     Types: 1 Glasses of wine, 1 Cans of beer per week     Comment: once in a blue moon liquor   • Drug use: No        Occupation: Teacher at [employer], +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 2022     Review of Systems   Constitutional: Positive for activity change. Negative for appetite change, chills, fatigue, fever, unexpected weight gain and unexpected weight loss.        Unchanged from previous exams   HENT: Negative.  Negative for congestion, dental problem, ear pain, hearing loss, mouth sores, nosebleeds, sore throat, swollen glands and trouble swallowing.    Eyes: Negative.  Negative for blurred vision, double vision, photophobia and pain.   Respiratory: Negative.  Negative for apnea, cough, chest tightness, shortness of breath and wheezing.    Cardiovascular: Negative.  Negative for chest pain, palpitations and leg swelling.   Gastrointestinal: Positive for abdominal distention, abdominal pain (lower), blood in stool (on and off, + hemerrhoids), diarrhea, nausea and GERD. Negative for constipation and vomiting.   Endocrine: Negative for cold intolerance and heat intolerance.   Genitourinary: Negative for breast discharge, breast lump, breast pain,  dysuria, frequency, hematuria and vaginal bleeding.   Musculoskeletal: Positive for back pain (lower back). Negative for arthralgias, gait problem, myalgias and neck stiffness.   Skin: Negative.  Negative for color change, pallor, rash and wound.   Allergic/Immunologic: Negative.  Negative for environmental allergies and immunocompromised state.   Neurological: Negative.  Negative for dizziness, seizures, syncope, weakness and light-headedness.   Hematological: Negative.  Negative for adenopathy. Does not bruise/bleed easily.   Psychiatric/Behavioral: Negative.  Negative for suicidal ideas and depressed mood. The patient is not nervous/anxious.            Physical Exam   I have reexamined the patient and the results are consistent with the previously documented exam. Amit Goldberg, MD     Physical Examination:   There were no vitals filed for this visit. There is no height or weight on file to calculate BSA.   Physical Exam  Constitutional:       General: She is awake.      Appearance: Normal appearance. She is well-developed and well-groomed. She is obese.      Comments: BMI >35   HENT:      Head: Normocephalic and atraumatic.      Nose: Nose normal.      Mouth/Throat:      Mouth: Mucous membranes are dry.   Eyes:      Pupils: Pupils are equal, round, and reactive to light.   Cardiovascular:      Rate and Rhythm: Normal rate and regular rhythm.      Pulses: Normal pulses.      Heart sounds: Normal heart sounds.   Pulmonary:      Effort: Pulmonary effort is normal.      Breath sounds: Normal breath sounds and air entry.   Chest:   Breasts:      Right: No supraclavicular adenopathy.      Left: No supraclavicular adenopathy.       Abdominal:      General: Abdomen is flat. Bowel sounds are normal.      Palpations: Abdomen is soft.      Comments: Centrally obese precluding proper evaluation for hepatosplenomegaly     Musculoskeletal:         General: Normal range of motion.      Cervical back: Neck supple.    Lymphadenopathy:      Head:      Right side of head: No submental, submandibular, preauricular, posterior auricular or occipital adenopathy.      Left side of head: No submental, submandibular, preauricular, posterior auricular or occipital adenopathy.      Upper Body:      Right upper body: No supraclavicular adenopathy.      Left upper body: No supraclavicular adenopathy.   Skin:     General: Skin is warm and dry.      Comments: Diffuse tattoos   Neurological:      General: No focal deficit present.      Mental Status: She is alert and oriented to person, place, and time.      Gait: Gait is intact.   Psychiatric:         Attention and Perception: Attention and perception normal.         Mood and Affect: Mood and affect normal.         Speech: Speech normal.         Behavior: Behavior normal. Behavior is cooperative.         Thought Content: Thought content normal.         Cognition and Memory: Cognition and memory normal.         Judgment: Judgment normal.         Labs/Radiology     Labs/X-ray results:     Lab Results - Last 18 Months   Lab Units 03/16/22  1355 01/13/22  1442 12/03/21  0732 10/01/21  0707 08/02/21  0959 07/19/21  1046 07/02/21  0934 03/18/21  0336 03/17/21  0346 03/16/21  0246 03/15/21  1855 03/15/21  1613 03/15/21  1613   HEMOGLOBIN g/dL 13.5 13.0 12.2 12.2 11.9* 11.3* 11.0*   < > 8.5*   < > 4.4*  --  4.6*   HEMATOCRIT % 40.2 39.0 37.7 38.2 36.6 35.9 34.1   < > 28.2*   < > 16.5*  --  17.4*   MCV fL 86.6 86.3 90.4 88.6 83.4 83.3 81.8   < > 68.4*   < > 60.4*  --  60.8*   WBC 10*3/mm3 4.46 4.88 4.68 6.21 6.32 7.21 6.33   < > 4.77   < > 6.47  --  6.65   RDW % 12.2* 11.8* 12.4 17.0* 15.1 13.7 13.2   < > 24.9*   < > 18.0*  --  18.0*   MPV fL 9.7 9.6 9.7 9.4 10.4 9.7 9.9   < > 10.4   < > 10.5  --  10.5   PLATELETS 10*3/mm3 273 255 239 257 238 206 241   < > 385   < > 463*  --  488*   IMM GRAN % % 0.2  --  0.4 0.3 1.3* 0.3 0.2   < >  --   --   --   --   --    NEUTROS ABS 10*3/mm3 2.19  --  2.62 2.83  4.36 5.53 4.73   < > 2.89   < > 4.92  --  5.00   LYMPHS ABS 10*3/mm3 1.65  --  1.40 2.64 1.31 1.15 1.03   < >  --    < >  --   --   --    MONOS ABS 10*3/mm3 0.46  --  0.56 0.58 0.52 0.47 0.49   < >  --    < >  --   --   --    EOS ABS 10*3/mm3 0.12  --  0.06 0.10 0.03 0.02 0.04   < > 0.14   < > 0.06   < >  --    BASOS ABS 10*3/mm3 0.03  --  0.02 0.04 0.02 0.02 0.03   < > 0.05   < > 0.13   < >  --    IMMATURE GRANS (ABS) 10*3/mm3 0.01  --  0.02 0.02 0.08* 0.02 0.01   < >  --   --   --   --   --    NRBC /100 WBC 0.0  --  0.0 0.0 0.0 0.0 0.0   < > 2.0*  --   --   --   --    NEUTROPHIL % %  --   --   --   --   --   --   --   --  60.6  --  76.0  --  75.2   MONOCYTES % %  --   --   --   --   --   --   --   --  6.1  --  3.0*  --  6.9   BASOPHIL % %  --   --   --   --   --   --   --   --  1.0  --  2.0*  --   --    ATYP LYMPH % %  --   --   --   --   --   --   --   --  1.0  --   --   --   --    ANISOCYTOSIS   --   --   --   --   --   --   --   --  Slight/1+  --  Large/3+  --  Large/3+   GIANT PLT   --   --   --   --   --   --   --   --  Slight/1+  --  Slight/1+  --  Slight/1+    < > = values in this interval not displayed.       Lab Results - Last 18 Months   Lab Units 03/16/22  1355 01/13/22  1442 12/03/21  0732 10/01/21  0707 08/02/21  0959 07/19/21  1046 05/19/21  1310 03/18/21  0336 03/17/21  0346 03/16/21  0246 03/15/21  1855 03/15/21  1613   GLUCOSE mg/dL 85 91 92 84 97 95   < > 96 89 94 105* 94   SODIUM mmol/L 139 139 138 142 139 139   < > 145 140 141 140 138   POTASSIUM mmol/L 4.3 4.0 4.0 4.0 3.7 3.9   < > 3.8 3.7 4.0 4.1 4.2   CO2 mmol/L 26.0 23.0 27.0 28.0 26.0 26.0   < > 23.0 25.0 24.0 24.0 23.0   CHLORIDE mmol/L 104 105 104 106 103 104   < > 111* 104 107 106 105   ANION GAP mmol/L 9.0 11.0 7.0 8.0 10.0 9.0   < > 11.0 11.0 10.0 10.0 10.0   CREATININE mg/dL 0.52* 0.65 0.44* 0.67 0.66 0.62   < > 0.57 0.59 0.61 0.55* 0.55*   BUN mg/dL 11 13 11 16 14 16   < > 8 9 9 9 10   BUN / CREAT RATIO  21.2 20.0 25.0 23.9 21.2  25.8*   < > 14.0 15.3 14.8 16.4 18.2   CALCIUM mg/dL 9.3 9.5 9.3 9.0 9.4 9.2   < > 8.7 8.9 9.1 9.2 9.5   EGFR IF NONAFRICN AM mL/min/1.73  --   --   --   --   --   --   --  119 115 110 124 124   ALK PHOS U/L 59 67 61 62 51 52   < >  --   --   --  46 48   TOTAL PROTEIN g/dL 7.1 7.1 6.3 6.5 6.6 6.9   < >  --   --   --  6.9 6.9   ALT (SGPT) U/L 12 12 14 13 16 14   < >  --   --   --  10 9   AST (SGOT) U/L 15 17 16 14 14 15   < >  --   --   --  18 16   BILIRUBIN mg/dL 1.0 0.8 1.5* 0.5 1.0 1.5*   < >  --   --   --  0.9 0.8   ALBUMIN g/dL 4.50 4.40 4.20 4.00 4.30 4.60   < >  --   --   --  4.30 4.40   GLOBULIN gm/dL 2.6 2.7 2.1 2.5 2.3 2.3   < >  --   --   --  2.6 2.5    < > = values in this interval not displayed.       Lab Results - Last 18 Months   Lab Units 11/05/21  0819 05/19/21  1310   CEA ng/mL 1 1.05   REFERENCE LAB REPORT   --  See Attached Result   See Attached Report  See Attached Report       Lab Results - Last 18 Months   Lab Units 03/16/22  1355 01/13/22  1442 12/03/21  0732 10/01/21  0707 08/02/21  0959 07/19/21  1046 05/27/21  1006 05/19/21  1310 03/15/21  2030   IRON mcg/dL 112 47 112 29* 60 28*   < > 112  --    TIBC mcg/dL 396 381 349 405 414 490   < > 533  --    IRON SATURATION % 28 12* 32 7* 14* 6*   < > 21  --    FERRITIN ng/mL 57.76 54.53 78.35 29.32 156.00* 9.57*   < > 14.82  --    FOLATE ng/mL  --   --  11.30  --   --   --   --  16.00 16.20    < > = values in this interval not displayed.     ____________________________________________________________________________  Radiology: No results found.             Assessment/Plan      Assessment/Plans:   Date 5.15.19 4.2020 3.15.21 5.19.21 7.2.21 12.3.21 1.13.22 4.18.22   WBC 3.75 7.0 6.65 5.99 6.33 4.68 4.88 5.87   Hb 11.9 14.1 4.6 (!) 11.1 11.0 12.2 13.0 12.7   MCV 84.5 85.4 60.8 81.1 81.8 90.4 86.3 86.8   Plt 247 272 488 250 241 239 255 229   ANC 2.32 4.70 5.0 4.17  2.62     ALC 0.98 1.50 1.18 1.20  1.40     AMC 0.38  0.46 0.56  0.56     AEC 0.04    0.02  0.06     ABC 0.02   002  0.02     Ferritin   2.49 (!!!) 14.82 23.13 78.35 54.53    Iron   10 (L) 112  112 47    Iron SAT   2%(L) 21%  32% 12%    Transferrin   393 (L) 358  234 256    TIBC   586 (L) 533  349 381    Hapto    191       LDH           PANKAJ           Ryan       92    Zinc       85    Retic   1.61%        B12   639 568  543     Folate   16.20 16.0  11.30     Hep panel       NEG    HIV       NEG    Creatinine 0.59 0.59 0.55 0.60 0.54 0.44 0.65    Glucose 86 92 94 86 109 92 91    ALT 15 13 9 10 9 14 12    AST 29 19 16 18 12 16 17    Alk phos 57 59 48 60 55 61 67    EGFR 116 115 124 136 >150 61 124    Total bilirubin 1.2 1.3 0.8 0.9  1.5 0.8    Direct bili       0.2    TSH 1.180 1.630         T4 1.04 1.24         Urinalysis  2+ blood NEGATIVE        ESR  12  20 16      CRP  <0.3         FOB   positive            ** Anemia, iron deficiency -resolved  ** H/O intermittent hematochezia, possible Inflamatory Bowel disease  - Peripheral blood smear: The red blood cells are predominately normochromic and microcytic.  There is moderate anisocytosis with microcytes, ovalocytes, and stomatocytes.  The white blood cell count is adequate with mature myeloid cells predominating.  The mature neutrophils have a normal number of segmented lobes.  Rare band forms are noted.  There is a full range of mature lymphocytes present.  Occasional reactive monocytes are present.  The platelet count is adequate with good cytoplasmic granules.  Rare giant platelets are noted. Diagnosis: Normochromic microcytic anemia.   Normal white blood cell count and differential. Normal platelet count (260). There is no evidence of a hematologic malignancy.  This patient has a microcytic anemia and may benefit with iron supplementation.  Clinical correlation is recommended.  - peripheral flow cytometry: no immunophenotypic evidence for abnormal myeloid maturation, an increase in blasts nor a lymphoproliferative disorder  - peripheral BCR/ABL  negative  - Peripheral SUELLEN 2 mutation: Negative    -Patient unable to tolerate p.o. iron other than once every 3 days 1 tablet and therefore receives IV iron supplementation when needed.    Iron and transfusions to date:  3/16/2021  4 units packed red blood cells (approximately 1000 mg iron)   3/17/2021  Venofer 200 mg   3/18/2021  Venofer 200 mg   6/22/2021  Venofer 200 mg   10.8.21 Venofer 200 mg   10.15.21 Venofer 200 mg   10.22.21 Venofer 200 mg   1.14.22  200 mg venofer   4.18.22 Hb WNL, Ferritin <50 --> requested venofer 200 mg at patient's convenience     ** Infection status:   · Covid vaccination status MODERNA x2 (4/3/21 was second dose) but has yet to receive the booster  -As patient had transfusion in March 2021 of 4 units,  HIV and hepatitis panels NEGATIVE    ** GI:   -elevations intermittently of total bilirubin. Direct bilirubin WNL, ? Gilbert's disease   - intermittent hematochezia likely the cause of the iron deficiency.   -Colonoscopy dated 3/18/2021: - The examined portion of the ileum was normal.- Erythematous mucosa in the rectum. Biopsied.- Non-bleeding internal hemorrhoids.  - Colonoscopy at [facility] 10.7.21 which was reportedly normal  -Small bowel pill endoscopy 4/16/2021: Negative  - EGD  3/18/2021: - Z-line irregular. Biopsied.- Normal stomach. Biopsied.- Normal first portion of the duodenum and second portion of the duodenum. 1. Random stomach, biopsy: Gastric mucosa with no significant pathologic changes.  2. Gastroesophageal junction, biopsies: Mild chronic inflammation. No evidence of intestinal metaplasia or dysplasia.  3. Rectum, biopsy: Mild hyperplastic changes with vascular congestion and focal lamina propria hemorrhage.  - recommended Dr. Mayra Stone at  for 3rd opinion  - was seen at [facility] and although there were no confirmatory tests, they are suspicious that she has Inflammatory Bowel Disease and treating her as such    ** Skin / Rash:   · diffuse tattoos, HIV and Hep  panels negative    ** :   · History of blood in the urine on previous urinalysis may be another source for iron loss      Follow up      - Follow up 4 months with APRN Amit Goldberg, MD    Time tracker     Total time spent caring for patient, reviewing lab and radiology information, and explaining the plan of care to the patient was 20 min

## 2022-04-18 ENCOUNTER — OFFICE VISIT (OUTPATIENT)
Dept: ONCOLOGY | Facility: CLINIC | Age: 37
End: 2022-04-18

## 2022-04-18 ENCOUNTER — LAB (OUTPATIENT)
Dept: LAB | Facility: HOSPITAL | Age: 37
End: 2022-04-18

## 2022-04-18 VITALS
WEIGHT: 220.5 LBS | HEART RATE: 82 BPM | HEIGHT: 66 IN | SYSTOLIC BLOOD PRESSURE: 118 MMHG | RESPIRATION RATE: 16 BRPM | TEMPERATURE: 97.9 F | BODY MASS INDEX: 35.44 KG/M2 | DIASTOLIC BLOOD PRESSURE: 78 MMHG | OXYGEN SATURATION: 99 %

## 2022-04-18 DIAGNOSIS — D50.9 IRON DEFICIENCY ANEMIA, UNSPECIFIED IRON DEFICIENCY ANEMIA TYPE: Primary | ICD-10-CM

## 2022-04-18 LAB
DEPRECATED RDW RBC AUTO: 40.3 FL (ref 37–54)
ERYTHROCYTE [DISTWIDTH] IN BLOOD BY AUTOMATED COUNT: 12.7 % (ref 12.3–15.4)
FERRITIN SERPL-MCNC: 47.6 NG/ML (ref 13–150)
HCT VFR BLD AUTO: 37.4 % (ref 34–46.6)
HGB BLD-MCNC: 12.7 G/DL (ref 12–15.9)
HOLD SPECIMEN: NORMAL
IRON 24H UR-MRATE: 60 MCG/DL (ref 37–145)
IRON SATN MFR SERPL: 16 % (ref 20–50)
MCH RBC QN AUTO: 29.5 PG (ref 26.6–33)
MCHC RBC AUTO-ENTMCNC: 34 G/DL (ref 31.5–35.7)
MCV RBC AUTO: 86.8 FL (ref 79–97)
PLATELET # BLD AUTO: 229 10*3/MM3 (ref 140–450)
PMV BLD AUTO: 9.7 FL (ref 6–12)
RBC # BLD AUTO: 4.31 10*6/MM3 (ref 3.77–5.28)
TIBC SERPL-MCNC: 375 MCG/DL (ref 298–536)
TRANSFERRIN SERPL-MCNC: 252 MG/DL (ref 200–360)
WBC NRBC COR # BLD: 5.87 10*3/MM3 (ref 3.4–10.8)

## 2022-04-18 PROCEDURE — 82728 ASSAY OF FERRITIN: CPT

## 2022-04-18 PROCEDURE — 83540 ASSAY OF IRON: CPT

## 2022-04-18 PROCEDURE — 99213 OFFICE O/P EST LOW 20 MIN: CPT | Performed by: INTERNAL MEDICINE

## 2022-04-18 PROCEDURE — 85027 COMPLETE CBC AUTOMATED: CPT

## 2022-04-18 PROCEDURE — 84466 ASSAY OF TRANSFERRIN: CPT

## 2022-04-18 PROCEDURE — 36415 COLL VENOUS BLD VENIPUNCTURE: CPT

## 2022-04-18 RX ORDER — MESALAMINE 1.2 G/1
TABLET, DELAYED RELEASE ORAL 3 TIMES DAILY
COMMUNITY
Start: 2022-04-16 | End: 2022-08-02

## 2022-04-19 ENCOUNTER — TELEPHONE (OUTPATIENT)
Dept: ONCOLOGY | Facility: CLINIC | Age: 37
End: 2022-04-19

## 2022-04-19 DIAGNOSIS — R93.5 ABNORMAL MRI, PELVIS: Primary | ICD-10-CM

## 2022-04-19 NOTE — TELEPHONE ENCOUNTER
Caller: EMERALD    Relationship to patient: SELF    Best call back number: [phone]    Patient is needing: TO SCHEDULE INFUSION PER DR. GOLDBERG'S NOTE. HUB ADVISED PT THERE IS NOTE FOR RN TO SCHEDULE IN CHART. HUB ADVISED RN WOULD REACH OUT TO SCHEDULE.

## 2022-04-20 RX ORDER — FAMOTIDINE 10 MG/ML
20 INJECTION, SOLUTION INTRAVENOUS AS NEEDED
Status: CANCELLED | OUTPATIENT
Start: 2022-05-06

## 2022-04-20 RX ORDER — SODIUM CHLORIDE 9 MG/ML
250 INJECTION, SOLUTION INTRAVENOUS ONCE
Status: CANCELLED | OUTPATIENT
Start: 2022-05-06

## 2022-04-20 RX ORDER — DIPHENHYDRAMINE HYDROCHLORIDE 50 MG/ML
50 INJECTION INTRAMUSCULAR; INTRAVENOUS AS NEEDED
Status: CANCELLED | OUTPATIENT
Start: 2022-05-06

## 2022-04-21 ENCOUNTER — TELEPHONE (OUTPATIENT)
Dept: GASTROENTEROLOGY | Age: 37
End: 2022-04-21

## 2022-04-21 NOTE — TELEPHONE ENCOUNTER
----- Message from Peggy Cristobal MA sent at 10/19/2021 10:21 AM CDT -----  10.15.21    rechecking pelvic MRI in 6 months, put her in recall for this. [address] [place] [address]

## 2022-04-21 NOTE — TELEPHONE ENCOUNTER
4. 21.22   [facility] FOR PT TO CALL AND SCHEDULE THE MRI PER .     ALSO SENT HER A MYCHART MSG

## 2022-04-25 ENCOUNTER — TELEPHONE (OUTPATIENT)
Dept: ONCOLOGY | Facility: CLINIC | Age: 37
End: 2022-04-25

## 2022-04-25 NOTE — TELEPHONE ENCOUNTER
PT STATED HER INFUSION WAS R/S BUT NO ONE CALLED AND THE NEW DATE/TIME DOES NOT WORK FOR HER. W/T TO OFFICE TO FURTHER ASSIST.

## 2022-04-25 NOTE — TELEPHONE ENCOUNTER
Caller: Ratna Flor    Relationship: Self    Best call back number: [phone]    What is the best time to reach you: ANYTIME    Who are you requesting to speak with (clinical staff, provider,  specific staff member): SCHEDULING    What was the call regarding: PT WANTED TO SEE IF THERE WERE ANY SOONER APPTS THAN 5/11 FOR HER INFUSION. WOULD LIKE TO BE SEEN ASAP.     PLEASE CALL TO ADVISE.     Do you require a callback: YES

## 2022-05-04 ENCOUNTER — HOSPITAL ENCOUNTER (OUTPATIENT)
Dept: MRI IMAGING | Age: 37
Discharge: HOME | End: 2022-05-04
Payer: COMMERCIAL

## 2022-05-04 DIAGNOSIS — R93.5 ABNORMAL MRI, PELVIS: ICD-10-CM

## 2022-05-04 PROCEDURE — 6360000004 HC RX CONTRAST MEDICATION: Performed by: NURSE PRACTITIONER

## 2022-05-04 PROCEDURE — A9577 INJ MULTIHANCE: HCPCS | Performed by: NURSE PRACTITIONER

## 2022-05-04 PROCEDURE — 72197 MRI PELVIS W/O & W/DYE: CPT

## 2022-05-04 RX ADMIN — GADOBENATE DIMEGLUMINE 20 ML: 529 INJECTION, SOLUTION INTRAVENOUS at 14:48

## 2022-05-05 ENCOUNTER — TELEPHONE (OUTPATIENT)
Dept: GASTROENTEROLOGY | Age: 37
End: 2022-05-05

## 2022-05-05 NOTE — TELEPHONE ENCOUNTER
Please let Ken Mcmillan know the MRI reveals the mild circumferential anorectal wall thickening as noted in last MRI. She has established with [facility], please fax results to her provider there and have her f/u there please.  I hope she is getting some answers and feeling better since thom seen her last !

## 2022-05-05 NOTE — TELEPHONE ENCOUNTER
5.5.22   THANKS NADEEM  PT'S BEEN NOTIFIED OF RESULTS AND RECOMMENDATIONS VIA VOICEMAIL.   RESULTS FAXED TO [phone]

## 2022-05-06 ENCOUNTER — INFUSION (OUTPATIENT)
Dept: ONCOLOGY | Facility: HOSPITAL | Age: 37
End: 2022-05-06

## 2022-05-06 ENCOUNTER — TELEPHONE (OUTPATIENT)
Dept: ONCOLOGY | Facility: CLINIC | Age: 37
End: 2022-05-06

## 2022-05-06 VITALS
RESPIRATION RATE: 16 BRPM | TEMPERATURE: 97.4 F | HEART RATE: 78 BPM | WEIGHT: 218 LBS | DIASTOLIC BLOOD PRESSURE: 68 MMHG | HEIGHT: 66 IN | BODY MASS INDEX: 35.03 KG/M2 | SYSTOLIC BLOOD PRESSURE: 102 MMHG | OXYGEN SATURATION: 100 %

## 2022-05-06 DIAGNOSIS — E61.1 IRON DEFICIENCY: Primary | ICD-10-CM

## 2022-05-06 DIAGNOSIS — D50.9 IRON DEFICIENCY ANEMIA, UNSPECIFIED IRON DEFICIENCY ANEMIA TYPE: ICD-10-CM

## 2022-05-06 PROCEDURE — 96365 THER/PROPH/DIAG IV INF INIT: CPT

## 2022-05-06 PROCEDURE — 25010000002 IRON SUCROSE PER 1 MG: Performed by: INTERNAL MEDICINE

## 2022-05-06 RX ORDER — SODIUM CHLORIDE 9 MG/ML
250 INJECTION, SOLUTION INTRAVENOUS ONCE
Status: COMPLETED | OUTPATIENT
Start: 2022-05-06 | End: 2022-05-06

## 2022-05-06 RX ORDER — FAMOTIDINE 10 MG/ML
20 INJECTION, SOLUTION INTRAVENOUS AS NEEDED
Status: DISCONTINUED | OUTPATIENT
Start: 2022-05-06 | End: 2022-05-06 | Stop reason: HOSPADM

## 2022-05-06 RX ORDER — DIPHENHYDRAMINE HYDROCHLORIDE 50 MG/ML
50 INJECTION INTRAMUSCULAR; INTRAVENOUS AS NEEDED
Status: DISCONTINUED | OUTPATIENT
Start: 2022-05-06 | End: 2022-05-06 | Stop reason: HOSPADM

## 2022-05-06 RX ADMIN — IRON SUCROSE 200 MG: 20 INJECTION, SOLUTION INTRAVENOUS at 14:11

## 2022-05-06 RX ADMIN — SODIUM CHLORIDE 250 ML: 9 INJECTION, SOLUTION INTRAVENOUS at 14:10

## 2022-05-06 NOTE — TELEPHONE ENCOUNTER
PT CALLING TO MAKE SURE SHE COULD COME FOR HER INFUSION APPT - WOKE UP WITH A RUNNY NOSE AND SORE THROAT BUT FEELS FINE AND HAD A NEG HOME COVID TEST. SPOKE WITH NANCY AND SHE SAID IT WOULD BE FINE FOR PT TO COME. INFORMED PT WHO V/U.

## 2022-05-11 ENCOUNTER — APPOINTMENT (OUTPATIENT)
Dept: ONCOLOGY | Facility: HOSPITAL | Age: 37
End: 2022-05-11

## 2022-07-29 DIAGNOSIS — D50.9 IRON DEFICIENCY ANEMIA, UNSPECIFIED IRON DEFICIENCY ANEMIA TYPE: Primary | ICD-10-CM

## 2022-08-01 NOTE — PROGRESS NOTES
[facility]  [address]  [place] [address] [phone]    Patient Name: Ratna Flor  Encounter Date: 08/02/2022   YOB: 1985  Patient Number: [id number]    Hematology / Oncology Progress Note      HPI / REASON FOR VISIT: Ratna Flor is a 36 y.o. female who is followed by this office for iron deficiency anemia.    She has been unable to tolerate oral iron and received IV iron with Venofer.  Last infusion was 05/06/22.      She presents to clinic today for continued follow up.  She states she feels tired and has continued abdominal pain.    She sees GI at [place].  Her last visit there was 07/17/22.  She had Flexible Sigmoidoscopy on 07/19/22 and per note,that path was negative and no signs of IBD. MD  recommended that she get a MRE and a capsule endoscopy.  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      Labs drawn and reviewed with patient.   Chemistry unremarkable.  CBC with hemoglobin 12.7, hematocrit 37.6, platelet count 218.  Anemia profile with serum iron 62, ferritin 53, saturation 17%, TIBC 367.  These labs are stable.          LABS    Lab Results - Last 18 Months   Lab Units 08/02/22  0905 04/18/22  1416 03/16/22  1355 01/13/22  1442 12/03/21  0732 10/01/21  0707 08/02/21  0959 07/19/21  1046 03/18/21  0336 03/17/21  0346 03/16/21  0246 03/15/21  1855 03/15/21  1613 03/15/21  1613   HEMOGLOBIN g/dL 12.7 12.7 13.5 13.0 12.2 12.2 11.9* 11.3*   < > 8.5*   < > 4.4*  --  4.6*   HEMATOCRIT % 37.6 37.4 40.2 39.0 37.7 38.2 36.6 35.9   < > 28.2*   < > 16.5*  --  17.4*   MCV fL 87.0 86.8 86.6 86.3 90.4 88.6 83.4 83.3   < > 68.4*   < > 60.4*  --  60.8*   WBC 10*3/mm3 3.90 5.87 4.46 4.88 4.68 6.21 6.32 7.21   < > 4.77   < > 6.47  --  6.65   RDW % 12.4 12.7 12.2*  11.8* 12.4 17.0* 15.1 13.7   < > 24.9*   < > 18.0*  --  18.0*   MPV fL 9.7 9.7 9.7 9.6 9.7 9.4 10.4 9.7   < > 10.4   < > 10.5  --  10.5   PLATELETS 10*3/mm3 218 229 273 255 239 257 238 206   < > 385   < > 463*  --  488*   IMM GRAN % % 0.3  --  0.2  --  0.4 0.3 1.3* 0.3   < >  --   --   --   --   --    NEUTROS ABS 10*3/mm3 2.16  --  2.19  --  2.62 2.83 4.36 5.53   < > 2.89   < > 4.92  --  5.00   LYMPHS ABS 10*3/mm3 1.08  --  1.65  --  1.40 2.64 1.31 1.15   < >  --    < >  --   --   --    MONOS ABS 10*3/mm3 0.56  --  0.46  --  0.56 0.58 0.52 0.47   < >  --    < >  --   --   --    EOS ABS 10*3/mm3 0.06  --  0.12  --  0.06 0.10 0.03 0.02   < > 0.14   < > 0.06   < >  --    BASOS ABS 10*3/mm3 0.03  --  0.03  --  0.02 0.04 0.02 0.02   < > 0.05   < > 0.13   < >  --    IMMATURE GRANS (ABS) 10*3/mm3 0.01  --  0.01  --  0.02 0.02 0.08* 0.02   < >  --   --   --   --   --    NRBC /100 WBC 0.0  --  0.0  --  0.0 0.0 0.0 0.0   < > 2.0*  --   --   --   --    NEUTROPHIL % %  --   --   --   --   --   --   --   --   --  60.6  --  76.0  --  75.2   MONOCYTES % %  --   --   --   --   --   --   --   --   --  6.1  --  3.0*  --  6.9   BASOPHIL % %  --   --   --   --   --   --   --   --   --  1.0  --  2.0*  --   --    ATYP LYMPH % %  --   --   --   --   --   --   --   --   --  1.0  --   --   --   --    ANISOCYTOSIS   --   --   --   --   --   --   --   --   --  Slight/1+  --  Large/3+  --  Large/3+   GIANT PLT   --   --   --   --   --   --   --   --   --  Slight/1+  --  Slight/1+  --  Slight/1+    < > = values in this interval not displayed.       Lab Results - Last 18 Months   Lab Units 08/02/22  0905 03/16/22  1355 01/13/22  1442 12/03/21  0732 10/01/21  0707 08/02/21  0959 05/19/21  1310 03/18/21  0336 03/17/21  0346 03/16/21  0246 03/15/21  1855 03/15/21  1613   GLUCOSE mg/dL 99 85 91 92 84 97   < > 96 89 94 105* 94   SODIUM mmol/L 139 139 139 138 142 139   < > 145 140 141 140 138   POTASSIUM mmol/L 4.1 4.3 4.0 4.0 4.0 3.7   < > 3.8 3.7  4.0 4.1 4.2   CO2 mmol/L 27.0 26.0 23.0 27.0 28.0 26.0   < > 23.0 25.0 24.0 24.0 23.0   CHLORIDE mmol/L 105 104 105 104 106 103   < > 111* 104 107 106 105   ANION GAP mmol/L 7.0 9.0 11.0 7.0 8.0 10.0   < > 11.0 11.0 10.0 10.0 10.0   CREATININE mg/dL 0.64 0.52* 0.65 0.44* 0.67 0.66   < > 0.57 0.59 0.61 0.55* 0.55*   BUN mg/dL 16 11 13 11 16 14   < > 8 9 9 9 10   BUN / CREAT RATIO  25.0 21.2 20.0 25.0 23.9 21.2   < > 14.0 15.3 14.8 16.4 18.2   CALCIUM mg/dL 9.2 9.3 9.5 9.3 9.0 9.4   < > 8.7 8.9 9.1 9.2 9.5   EGFR IF NONAFRICN AM mL/min/1.73  --   --   --   --   --   --   --  119 115 110 124 124   ALK PHOS U/L 58 59 67 61 62 51   < >  --   --   --  46 48   TOTAL PROTEIN g/dL 7.0 7.1 7.1 6.3 6.5 6.6   < >  --   --   --  6.9 6.9   ALT (SGPT) U/L 9 12 12 14 13 16   < >  --   --   --  10 9   AST (SGOT) U/L 14 15 17 16 14 14   < >  --   --   --  18 16   BILIRUBIN mg/dL 1.0 1.0 0.8 1.5* 0.5 1.0   < >  --   --   --  0.9 0.8   ALBUMIN g/dL 4.30 4.50 4.40 4.20 4.00 4.30   < >  --   --   --  4.30 4.40   GLOBULIN gm/dL 2.7 2.6 2.7 2.1 2.5 2.3   < >  --   --   --  2.6 2.5    < > = values in this interval not displayed.       Lab Results - Last 18 Months   Lab Units 11/05/21  0819 05/19/21  1310   CEA ng/mL 1 1.05   REFERENCE LAB REPORT   --  See Attached Result   See Attached Report  See Attached Report       Lab Results - Last 18 Months   Lab Units 08/02/22  0905 07/19/22  1221 04/18/22  1416 03/16/22  1355 01/13/22  1442 12/03/21  0732 05/27/21  1006 05/19/21  1310 03/15/21  2030   IRON mcg/dL 62 71 60 112 47 112   < > 112  --    TIBC mcg/dL 367 298 375 396 381 349   < > 533  --    IRON SATURATION % 17* 24 16* 28 12* 32   < > 21  --    FERRITIN ng/mL 53.72 54 47.60 57.76 54.53 78.35   < > 14.82  --    TSH mcunit/mL  --  1.182  --   --   --   --   --   --   --    FOLATE ng/mL  --   --   --   --   --  11.30  --  16.00 16.20    < > = values in this interval not displayed.         PAST MEDICAL HISTORY:  ALLERGIES:  No Known  Allergies  CURRENT MEDICATIONS:  Outpatient Encounter Medications as of 8/2/2022   Medication Sig Dispense Refill   • ondansetron ODT (ZOFRAN-ODT) 8 MG disintegrating tablet Place 1 tablet on the tongue Every 8 (Eight) Hours As Needed for Nausea or Vomiting. 15 tablet 0   • pantoprazole (PROTONIX) 40 MG EC tablet TAKE 1 TABLET BY MOUTH EVERY DAY 90 tablet 1   • ferrous sulfate (FerrouSul) 325 (65 FE) MG tablet Take 1 tablet by mouth Daily With Breakfast. (Patient taking differently: Take 325 mg by mouth Every 72 (Seventy-Two) Hours. Pt takes twice weekly)     • [DISCONTINUED] mesalamine (LIALDA) 1.2 g EC tablet 3 (Three) Times a Day.       No facility-administered encounter medications on file as of 8/2/2022.     ADULT ILLNESSES:  Patient Active Problem List   Diagnosis Code   • Other constipation K59.09   • Rectal bleeding K62.5   • Other hemorrhoids K64.8   • Epigastric pain R10.13   • Gastroesophageal reflux disease with esophagitis K21.00   • Nausea R11.0   • Gastrointestinal hemorrhage K92.2   • Anemia D64.9   • Iron deficiency anemia D50.9   • Abnormal CT scan abdomen R93.89   • Anemia, blood loss D50.0   • Blood in the stool K92.1   • Lower abdominal pain R10.30   • Leukopenia D72.819   • Iron deficiency E61.1   • Colitis K52.9     SURGERIES:  Past Surgical History:   Procedure Laterality Date   • CAPSULE ENDOSCOPY N/A 4/16/2021    Procedure: CAPSULE ENDOSCOPY M2A;  Surgeon: Octavia Mattson MD;  Location: [facility] ENDOSCOPY;  Service: Gastroenterology;  Laterality: N/A; Normal exam.  No blood in lumen.  Capsule seen entering the colon.   • COLONOSCOPY N/A 5/22/2019    Non-bleeding internal hemorrhoids; The examination was otherwise normal; No specimens collected; Repeat colonoscopy at age 48 for screening purposes   • COLONOSCOPY N/A 3/18/2021    Dr. Hernandez-The examined portion of the ileum was normal; Erythematous mucosa in the rectum-biopsied; Non-bleeding internal hemorrhoids   • ENDOSCOPY N/A 6/17/2020     LA Grade A reflux esophagitis; Normal stomach-biopsied; Normal examined duodenum   • ENDOSCOPY N/A 3/18/2021    Dr. Rader irregular-biopsied; Normal stomach-biopsied; Normal first portion of the duodenum and second portion of the duodenum   • WISDOM TOOTH EXTRACTION       HEALTH MAINTENANCE ITEMS:  Health Maintenance Due   Topic Date Due   • ANNUAL PHYSICAL  Never done   • PAP SMEAR  Never done       <no information>  Last Completed Colonoscopy     This patient has no relevant Health Maintenance data.        There is no immunization history for the selected administration types on file for this patient.  Last Completed Mammogram     This patient has no relevant Health Maintenance data.            FAMILY HISTORY:  Family History   Problem Relation Age of Onset   • Heart defect Mother    • No Known Problems Father    • Hypertension Maternal Grandmother    • No Known Problems Maternal Grandfather    • No Known Problems Paternal Grandmother    • Prostate cancer Paternal Grandfather    • Pancreatic cancer Paternal Grandfather    • Colon cancer Neg Hx    • Colon polyps Neg Hx    • Esophageal cancer Neg Hx    • Liver cancer Neg Hx    • Stomach cancer Neg Hx    • Rectal cancer Neg Hx    • Liver disease Neg Hx      SOCIAL HISTORY:  Social History     Socioeconomic History   • Marital status:    Tobacco Use   • Smoking status: Former Smoker     Packs/day: 0.50     Years: 5.00     Pack years: 2.50     Types: Cigarettes     Quit date:      Years since quittin.5   • Smokeless tobacco: Never Used   Vaping Use   • Vaping Use: Never used   Substance and Sexual Activity   • Alcohol use: Yes     Alcohol/week: 2.0 standard drinks     Types: 1 Glasses of wine, 1 Cans of beer per week     Comment: once in a blue moon liquor   • Drug use: No   • Sexual activity: Defer       REVIEW OF SYSTEMS:  Review of Systems   Constitutional: Negative for fatigue and fever.   HENT: Negative for trouble swallowing.   "  Respiratory: Negative for cough and shortness of breath.    Cardiovascular: Negative for chest pain, palpitations and leg swelling.   Gastrointestinal: Positive for abdominal pain, blood in stool and nausea. Negative for vomiting.   Genitourinary: Negative for hematuria.   Musculoskeletal: Negative for arthralgias and myalgias.   Skin: Negative for rash, skin lesions and wound.   Neurological: Negative for dizziness, syncope, memory problem and confusion.   Psychiatric/Behavioral: Negative for suicidal ideas and depressed mood. The patient is not nervous/anxious.        /78   Pulse 86   Temp 98.5 °F (36.9 °C)   Resp 16   Ht 167.6 cm (66\")   Wt 97 kg (213 lb 14.4 oz)   SpO2 97%   Breastfeeding No   BMI 34.52 kg/m²  Body surface area is 2.06 meters squared.    Pain Score    08/02/22 0917   PainSc: 0-No pain       Physical Exam:  Physical Exam  Constitutional:       Appearance: Normal appearance.   HENT:      Head: Normocephalic and atraumatic.   Cardiovascular:      Rate and Rhythm: Normal rate and regular rhythm.   Pulmonary:      Effort: Pulmonary effort is normal.      Breath sounds: Normal breath sounds.   Abdominal:      General: Bowel sounds are normal.      Palpations: Abdomen is soft.   Musculoskeletal:      Right lower leg: No edema.      Left lower leg: No edema.   Skin:     General: Skin is warm and dry.   Neurological:      Mental Status: She is alert and oriented to person, place, and time.   Psychiatric:         Attention and Perception: Attention normal.         Mood and Affect: Mood normal.         Judgment: Judgment normal.         Ratna Flor reports a pain score of 0 .  No intervention indicated.       ASSESSMENT / PLAN    1. Iron deficiency anemia, unspecified iron deficiency anemia type    2. Lower abdominal pain         ASSESSMENT:    1.  Iron Deficiency   -Last Venofer infusion was 05/06/22.  CBC with hemoglobin 12.7, hematocrit 37.6, platelet count 218.  Anemia profile " with serum iron 62, ferritin 53, saturation 17%, TIBC 367.  Stable for observation     2.  Lower Abdominal Pain   -She sees GI at [facility].  Her last visit there was 07/17/22.    She had Flexible Sigmoidoscopy on 07/19/22 and per note,that path was negative and no signs of IBD. MD  recommended that she get a MRE and a capsule endoscopy.  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  -Pt states she was previously given steroids here per Dr. Torres which helped her symptoms.  Review of chart does support that.    -Pt advised to continue follow up per [facility] recommendations.     -PLAN:  Stable for observation.    Return to office in 3 months.  Patient will have preoffice labs.    Orders Placed This Encounter   Procedures   • Comprehensive Metabolic Panel   • Iron Profile   • Ferritin   • CBC & Differential      Continue current medications, treatment plans and follow up with PCP and any other providers.  Care discussed with patient.  Understanding expressed.  Patient agreeable with plan.      I spent 35 minutes caring for Ratna on this date of service. This time includes time spent by me in the following activities: preparing for the visit, reviewing tests, obtaining and/or reviewing a separately obtained history, performing a medically appropriate examination and/or evaluation, counseling and educating the patient/family/caregiver, ordering medications, tests, or procedures and documenting information in the medical record.       Karena Tong, MARIA INES  08/02/2022

## 2022-08-02 ENCOUNTER — LAB (OUTPATIENT)
Dept: LAB | Facility: HOSPITAL | Age: 37
End: 2022-08-02

## 2022-08-02 ENCOUNTER — OFFICE VISIT (OUTPATIENT)
Dept: ONCOLOGY | Facility: CLINIC | Age: 37
End: 2022-08-02

## 2022-08-02 VITALS
RESPIRATION RATE: 16 BRPM | SYSTOLIC BLOOD PRESSURE: 118 MMHG | HEART RATE: 86 BPM | DIASTOLIC BLOOD PRESSURE: 78 MMHG | HEIGHT: 66 IN | BODY MASS INDEX: 34.38 KG/M2 | WEIGHT: 213.9 LBS | TEMPERATURE: 98.5 F | OXYGEN SATURATION: 97 %

## 2022-08-02 DIAGNOSIS — R10.30 LOWER ABDOMINAL PAIN: ICD-10-CM

## 2022-08-02 DIAGNOSIS — D50.9 IRON DEFICIENCY ANEMIA, UNSPECIFIED IRON DEFICIENCY ANEMIA TYPE: Primary | ICD-10-CM

## 2022-08-02 LAB
ALBUMIN SERPL-MCNC: 4.3 G/DL (ref 3.5–5.2)
ALBUMIN/GLOB SERPL: 1.6 G/DL
ALP SERPL-CCNC: 58 U/L (ref 39–117)
ALT SERPL W P-5'-P-CCNC: 9 U/L (ref 1–33)
ANION GAP SERPL CALCULATED.3IONS-SCNC: 7 MMOL/L (ref 5–15)
AST SERPL-CCNC: 14 U/L (ref 1–32)
BASOPHILS # BLD AUTO: 0.03 10*3/MM3 (ref 0–0.2)
BASOPHILS NFR BLD AUTO: 0.8 % (ref 0–1.5)
BILIRUB SERPL-MCNC: 1 MG/DL (ref 0–1.2)
BUN SERPL-MCNC: 16 MG/DL (ref 6–20)
BUN/CREAT SERPL: 25 (ref 7–25)
CALCIUM SPEC-SCNC: 9.2 MG/DL (ref 8.6–10.5)
CHLORIDE SERPL-SCNC: 105 MMOL/L (ref 98–107)
CO2 SERPL-SCNC: 27 MMOL/L (ref 22–29)
CREAT SERPL-MCNC: 0.64 MG/DL (ref 0.57–1)
DEPRECATED RDW RBC AUTO: 39.6 FL (ref 37–54)
EGFRCR SERPLBLD CKD-EPI 2021: 116.2 ML/MIN/1.73
EOSINOPHIL # BLD AUTO: 0.06 10*3/MM3 (ref 0–0.4)
EOSINOPHIL NFR BLD AUTO: 1.5 % (ref 0.3–6.2)
ERYTHROCYTE [DISTWIDTH] IN BLOOD BY AUTOMATED COUNT: 12.4 % (ref 12.3–15.4)
FERRITIN SERPL-MCNC: 53.72 NG/ML (ref 13–150)
GLOBULIN UR ELPH-MCNC: 2.7 GM/DL
GLUCOSE SERPL-MCNC: 99 MG/DL (ref 65–99)
HCT VFR BLD AUTO: 37.6 % (ref 34–46.6)
HGB BLD-MCNC: 12.7 G/DL (ref 12–15.9)
HOLD SPECIMEN: NORMAL
IMM GRANULOCYTES # BLD AUTO: 0.01 10*3/MM3 (ref 0–0.05)
IMM GRANULOCYTES NFR BLD AUTO: 0.3 % (ref 0–0.5)
IRON 24H UR-MRATE: 62 MCG/DL (ref 37–145)
IRON SATN MFR SERPL: 17 % (ref 20–50)
LYMPHOCYTES # BLD AUTO: 1.08 10*3/MM3 (ref 0.7–3.1)
LYMPHOCYTES NFR BLD AUTO: 27.7 % (ref 19.6–45.3)
MCH RBC QN AUTO: 29.4 PG (ref 26.6–33)
MCHC RBC AUTO-ENTMCNC: 33.8 G/DL (ref 31.5–35.7)
MCV RBC AUTO: 87 FL (ref 79–97)
MONOCYTES # BLD AUTO: 0.56 10*3/MM3 (ref 0.1–0.9)
MONOCYTES NFR BLD AUTO: 14.4 % (ref 5–12)
NEUTROPHILS NFR BLD AUTO: 2.16 10*3/MM3 (ref 1.7–7)
NEUTROPHILS NFR BLD AUTO: 55.3 % (ref 42.7–76)
NRBC BLD AUTO-RTO: 0 /100 WBC (ref 0–0.2)
PLATELET # BLD AUTO: 218 10*3/MM3 (ref 140–450)
PMV BLD AUTO: 9.7 FL (ref 6–12)
POTASSIUM SERPL-SCNC: 4.1 MMOL/L (ref 3.5–5.2)
PROT SERPL-MCNC: 7 G/DL (ref 6–8.5)
RBC # BLD AUTO: 4.32 10*6/MM3 (ref 3.77–5.28)
SODIUM SERPL-SCNC: 139 MMOL/L (ref 136–145)
TIBC SERPL-MCNC: 367 MCG/DL (ref 298–536)
TRANSFERRIN SERPL-MCNC: 246 MG/DL (ref 200–360)
WBC NRBC COR # BLD: 3.9 10*3/MM3 (ref 3.4–10.8)

## 2022-08-02 PROCEDURE — 82728 ASSAY OF FERRITIN: CPT

## 2022-08-02 PROCEDURE — 80053 COMPREHEN METABOLIC PANEL: CPT

## 2022-08-02 PROCEDURE — 85025 COMPLETE CBC W/AUTO DIFF WBC: CPT

## 2022-08-02 PROCEDURE — 36415 COLL VENOUS BLD VENIPUNCTURE: CPT

## 2022-08-02 PROCEDURE — 83540 ASSAY OF IRON: CPT

## 2022-08-02 PROCEDURE — 84466 ASSAY OF TRANSFERRIN: CPT

## 2022-08-02 PROCEDURE — 99214 OFFICE O/P EST MOD 30 MIN: CPT | Performed by: NURSE PRACTITIONER

## 2022-08-08 PROCEDURE — 87635 SARS-COV-2 COVID-19 AMP PRB: CPT | Performed by: NURSE PRACTITIONER

## 2022-09-13 RX ORDER — PANTOPRAZOLE SODIUM 40 MG/1
TABLET, DELAYED RELEASE ORAL
Qty: 90 TABLET | Refills: 0 | Status: SHIPPED | OUTPATIENT
Start: 2022-09-13

## 2022-09-14 RX ORDER — PANTOPRAZOLE SODIUM 40 MG/1
TABLET, DELAYED RELEASE ORAL
Qty: 90 TABLET | Refills: 0 | OUTPATIENT
Start: 2022-09-14

## 2022-10-03 ENCOUNTER — TELEPHONE (OUTPATIENT)
Dept: ONCOLOGY | Facility: CLINIC | Age: 37
End: 2022-10-03

## 2022-10-03 DIAGNOSIS — D50.9 IRON DEFICIENCY ANEMIA, UNSPECIFIED IRON DEFICIENCY ANEMIA TYPE: Primary | ICD-10-CM

## 2022-10-03 NOTE — TELEPHONE ENCOUNTER
TRIED CALLING EMERALD BUT THE PHONE JUST RANG. I HAVE HER SET UP FOR LABS [facility] 10[facility]04/2022 AT 1:20 [facility]. WILL TRY AND CALL BACK. 10/03/2022 SCOT

## 2022-10-03 NOTE — TELEPHONE ENCOUNTER
Caller: Ratna Flor    Relationship: Self    Best call back number: [phone]    What is the best time to reach you: ANYTIME    Who are you requesting to speak with (clinical staff, provider,  specific staff member): HILTON AMY SPANN     What was the call regarding: PT STATED THAT SHE HAS BEEN HAVING SOME BLEEDING AND WOULD LIKE TO COME IN TO GET HER LABS CHECKED THIS WEEK. SHE IS FREE TOMORROW AFTER 1 PM AND ALSO WEDS AND THURS.     PLEASE CALL ASAP TO SCHEDULE.     Do you require a callback: YES

## 2022-10-04 ENCOUNTER — LAB (OUTPATIENT)
Dept: LAB | Facility: HOSPITAL | Age: 37
End: 2022-10-04

## 2022-10-04 DIAGNOSIS — D50.9 IRON DEFICIENCY ANEMIA, UNSPECIFIED IRON DEFICIENCY ANEMIA TYPE: ICD-10-CM

## 2022-10-04 DIAGNOSIS — D50.9 IRON DEFICIENCY ANEMIA, UNSPECIFIED IRON DEFICIENCY ANEMIA TYPE: Primary | ICD-10-CM

## 2022-10-04 LAB
ALBUMIN SERPL-MCNC: 4.6 G/DL (ref 3.5–5.2)
ALBUMIN/GLOB SERPL: 1.8 G/DL
ALP SERPL-CCNC: 53 U/L (ref 39–117)
ALT SERPL W P-5'-P-CCNC: 8 U/L (ref 1–33)
ANION GAP SERPL CALCULATED.3IONS-SCNC: 9 MMOL/L (ref 5–15)
AST SERPL-CCNC: 11 U/L (ref 1–32)
BASOPHILS # BLD AUTO: 0.02 10*3/MM3 (ref 0–0.2)
BASOPHILS NFR BLD AUTO: 0.4 % (ref 0–1.5)
BILIRUB SERPL-MCNC: 0.8 MG/DL (ref 0–1.2)
BUN SERPL-MCNC: 9 MG/DL (ref 6–20)
BUN/CREAT SERPL: 10.6 (ref 7–25)
CALCIUM SPEC-SCNC: 9.1 MG/DL (ref 8.6–10.5)
CHLORIDE SERPL-SCNC: 102 MMOL/L (ref 98–107)
CO2 SERPL-SCNC: 28 MMOL/L (ref 22–29)
CREAT SERPL-MCNC: 0.85 MG/DL (ref 0.57–1)
DEPRECATED RDW RBC AUTO: 40.7 FL (ref 37–54)
EGFRCR SERPLBLD CKD-EPI 2021: 90.1 ML/MIN/1.73
EOSINOPHIL # BLD AUTO: 0.04 10*3/MM3 (ref 0–0.4)
EOSINOPHIL NFR BLD AUTO: 0.7 % (ref 0.3–6.2)
ERYTHROCYTE [DISTWIDTH] IN BLOOD BY AUTOMATED COUNT: 12.6 % (ref 12.3–15.4)
GLOBULIN UR ELPH-MCNC: 2.5 GM/DL
GLUCOSE SERPL-MCNC: 98 MG/DL (ref 65–99)
HCT VFR BLD AUTO: 34.6 % (ref 34–46.6)
HGB BLD-MCNC: 11.9 G/DL (ref 12–15.9)
IMM GRANULOCYTES # BLD AUTO: 0.01 10*3/MM3 (ref 0–0.05)
IMM GRANULOCYTES NFR BLD AUTO: 0.2 % (ref 0–0.5)
IRON 24H UR-MRATE: 39 MCG/DL (ref 37–145)
IRON SATN MFR SERPL: 11 % (ref 20–50)
LYMPHOCYTES # BLD AUTO: 1.43 10*3/MM3 (ref 0.7–3.1)
LYMPHOCYTES NFR BLD AUTO: 25.8 % (ref 19.6–45.3)
MCH RBC QN AUTO: 29.9 PG (ref 26.6–33)
MCHC RBC AUTO-ENTMCNC: 34.4 G/DL (ref 31.5–35.7)
MCV RBC AUTO: 86.9 FL (ref 79–97)
MONOCYTES # BLD AUTO: 0.43 10*3/MM3 (ref 0.1–0.9)
MONOCYTES NFR BLD AUTO: 7.7 % (ref 5–12)
NEUTROPHILS NFR BLD AUTO: 3.62 10*3/MM3 (ref 1.7–7)
NEUTROPHILS NFR BLD AUTO: 65.2 % (ref 42.7–76)
NRBC BLD AUTO-RTO: 0 /100 WBC (ref 0–0.2)
PLATELET # BLD AUTO: 274 10*3/MM3 (ref 140–450)
PMV BLD AUTO: 9.6 FL (ref 6–12)
POTASSIUM SERPL-SCNC: 4.2 MMOL/L (ref 3.5–5.2)
PROT SERPL-MCNC: 7.1 G/DL (ref 6–8.5)
RBC # BLD AUTO: 3.98 10*6/MM3 (ref 3.77–5.28)
SODIUM SERPL-SCNC: 139 MMOL/L (ref 136–145)
TIBC SERPL-MCNC: 353 MCG/DL (ref 298–536)
TRANSFERRIN SERPL-MCNC: 237 MG/DL (ref 200–360)
WBC NRBC COR # BLD: 5.55 10*3/MM3 (ref 3.4–10.8)

## 2022-10-04 PROCEDURE — 80053 COMPREHEN METABOLIC PANEL: CPT

## 2022-10-04 PROCEDURE — 84466 ASSAY OF TRANSFERRIN: CPT

## 2022-10-04 PROCEDURE — 36415 COLL VENOUS BLD VENIPUNCTURE: CPT

## 2022-10-04 PROCEDURE — 83540 ASSAY OF IRON: CPT

## 2022-10-04 PROCEDURE — 85025 COMPLETE CBC W/AUTO DIFF WBC: CPT | Performed by: NURSE PRACTITIONER

## 2022-11-01 ENCOUNTER — APPOINTMENT (OUTPATIENT)
Dept: LAB | Facility: HOSPITAL | Age: 37
End: 2022-11-01

## 2022-11-01 ENCOUNTER — LAB (OUTPATIENT)
Dept: LAB | Facility: HOSPITAL | Age: 37
End: 2022-11-01

## 2022-11-01 DIAGNOSIS — D50.9 IRON DEFICIENCY ANEMIA, UNSPECIFIED IRON DEFICIENCY ANEMIA TYPE: ICD-10-CM

## 2022-11-01 DIAGNOSIS — D50.9 IRON DEFICIENCY ANEMIA, UNSPECIFIED IRON DEFICIENCY ANEMIA TYPE: Primary | ICD-10-CM

## 2022-11-01 LAB
ALBUMIN SERPL-MCNC: 4.8 G/DL (ref 3.5–5.2)
ALBUMIN/GLOB SERPL: 1.9 G/DL
ALP SERPL-CCNC: 57 U/L (ref 39–117)
ALT SERPL W P-5'-P-CCNC: 10 U/L (ref 1–33)
ANION GAP SERPL CALCULATED.3IONS-SCNC: 8 MMOL/L (ref 5–15)
AST SERPL-CCNC: 14 U/L (ref 1–32)
BASOPHILS # BLD AUTO: 0.03 10*3/MM3 (ref 0–0.2)
BASOPHILS NFR BLD AUTO: 0.5 % (ref 0–1.5)
BILIRUB SERPL-MCNC: 1 MG/DL (ref 0–1.2)
BUN SERPL-MCNC: 10 MG/DL (ref 6–20)
BUN/CREAT SERPL: 17.2 (ref 7–25)
CALCIUM SPEC-SCNC: 9.8 MG/DL (ref 8.6–10.5)
CHLORIDE SERPL-SCNC: 102 MMOL/L (ref 98–107)
CO2 SERPL-SCNC: 29 MMOL/L (ref 22–29)
CREAT SERPL-MCNC: 0.58 MG/DL (ref 0.57–1)
DEPRECATED RDW RBC AUTO: 40.6 FL (ref 37–54)
EGFRCR SERPLBLD CKD-EPI 2021: 119 ML/MIN/1.73
EOSINOPHIL # BLD AUTO: 0.03 10*3/MM3 (ref 0–0.4)
EOSINOPHIL NFR BLD AUTO: 0.5 % (ref 0.3–6.2)
ERYTHROCYTE [DISTWIDTH] IN BLOOD BY AUTOMATED COUNT: 12.3 % (ref 12.3–15.4)
FERRITIN SERPL-MCNC: 15.6 NG/ML (ref 13–150)
GLOBULIN UR ELPH-MCNC: 2.5 GM/DL
GLUCOSE SERPL-MCNC: 94 MG/DL (ref 65–99)
HCT VFR BLD AUTO: 38.5 % (ref 34–46.6)
HGB BLD-MCNC: 12.5 G/DL (ref 12–15.9)
IMM GRANULOCYTES # BLD AUTO: 0.02 10*3/MM3 (ref 0–0.05)
IMM GRANULOCYTES NFR BLD AUTO: 0.3 % (ref 0–0.5)
IRON 24H UR-MRATE: 31 MCG/DL (ref 37–145)
IRON SATN MFR SERPL: 7 % (ref 20–50)
LYMPHOCYTES # BLD AUTO: 1.56 10*3/MM3 (ref 0.7–3.1)
LYMPHOCYTES NFR BLD AUTO: 26.2 % (ref 19.6–45.3)
MCH RBC QN AUTO: 29.1 PG (ref 26.6–33)
MCHC RBC AUTO-ENTMCNC: 32.5 G/DL (ref 31.5–35.7)
MCV RBC AUTO: 89.7 FL (ref 79–97)
MONOCYTES # BLD AUTO: 0.5 10*3/MM3 (ref 0.1–0.9)
MONOCYTES NFR BLD AUTO: 8.4 % (ref 5–12)
NEUTROPHILS NFR BLD AUTO: 3.81 10*3/MM3 (ref 1.7–7)
NEUTROPHILS NFR BLD AUTO: 64.1 % (ref 42.7–76)
NRBC BLD AUTO-RTO: 0 /100 WBC (ref 0–0.2)
PLATELET # BLD AUTO: 270 10*3/MM3 (ref 140–450)
PMV BLD AUTO: 9.7 FL (ref 6–12)
POTASSIUM SERPL-SCNC: 4.5 MMOL/L (ref 3.5–5.2)
PROT SERPL-MCNC: 7.3 G/DL (ref 6–8.5)
RBC # BLD AUTO: 4.29 10*6/MM3 (ref 3.77–5.28)
RETICS # AUTO: 0.06 10*6/MM3 (ref 0.02–0.13)
RETICS/RBC NFR AUTO: 1.35 % (ref 0.7–1.9)
SODIUM SERPL-SCNC: 139 MMOL/L (ref 136–145)
TIBC SERPL-MCNC: 438 MCG/DL (ref 298–536)
TRANSFERRIN SERPL-MCNC: 294 MG/DL (ref 200–360)
WBC NRBC COR # BLD: 5.95 10*3/MM3 (ref 3.4–10.8)

## 2022-11-01 PROCEDURE — 80053 COMPREHEN METABOLIC PANEL: CPT

## 2022-11-01 PROCEDURE — 83540 ASSAY OF IRON: CPT

## 2022-11-01 PROCEDURE — 84466 ASSAY OF TRANSFERRIN: CPT

## 2022-11-01 PROCEDURE — 36415 COLL VENOUS BLD VENIPUNCTURE: CPT

## 2022-11-01 PROCEDURE — 85025 COMPLETE CBC W/AUTO DIFF WBC: CPT

## 2022-11-01 PROCEDURE — 82728 ASSAY OF FERRITIN: CPT

## 2022-11-01 PROCEDURE — 85045 AUTOMATED RETICULOCYTE COUNT: CPT

## 2022-11-17 RX ORDER — SODIUM CHLORIDE 9 MG/ML
250 INJECTION, SOLUTION INTRAVENOUS ONCE
Status: CANCELLED | OUTPATIENT
Start: 2022-12-02

## 2022-11-17 RX ORDER — DIPHENHYDRAMINE HCL 25 MG
25 CAPSULE ORAL ONCE
Status: CANCELLED | OUTPATIENT
Start: 2022-12-02

## 2022-11-17 RX ORDER — ACETAMINOPHEN 325 MG/1
650 TABLET ORAL ONCE
Status: CANCELLED | OUTPATIENT
Start: 2022-12-02

## 2022-11-17 RX ORDER — DIPHENHYDRAMINE HCL 25 MG
25 CAPSULE ORAL ONCE
Status: CANCELLED | OUTPATIENT
Start: 2022-12-05

## 2022-11-17 RX ORDER — DIPHENHYDRAMINE HYDROCHLORIDE 50 MG/ML
50 INJECTION INTRAMUSCULAR; INTRAVENOUS AS NEEDED
Status: CANCELLED | OUTPATIENT
Start: 2022-12-05

## 2022-11-17 RX ORDER — FAMOTIDINE 10 MG/ML
20 INJECTION, SOLUTION INTRAVENOUS AS NEEDED
Status: CANCELLED | OUTPATIENT
Start: 2022-12-05

## 2022-11-17 RX ORDER — FAMOTIDINE 10 MG/ML
20 INJECTION, SOLUTION INTRAVENOUS AS NEEDED
Status: CANCELLED | OUTPATIENT
Start: 2022-12-02

## 2022-11-17 RX ORDER — ACETAMINOPHEN 325 MG/1
650 TABLET ORAL ONCE
Status: CANCELLED | OUTPATIENT
Start: 2022-12-05

## 2022-11-17 RX ORDER — DIPHENHYDRAMINE HYDROCHLORIDE 50 MG/ML
50 INJECTION INTRAMUSCULAR; INTRAVENOUS AS NEEDED
Status: CANCELLED | OUTPATIENT
Start: 2022-12-02

## 2022-11-17 RX ORDER — SODIUM CHLORIDE 9 MG/ML
250 INJECTION, SOLUTION INTRAVENOUS ONCE
Status: CANCELLED | OUTPATIENT
Start: 2022-12-05

## 2022-11-21 DIAGNOSIS — D50.9 IRON DEFICIENCY ANEMIA, UNSPECIFIED IRON DEFICIENCY ANEMIA TYPE: Primary | ICD-10-CM

## 2022-11-22 ENCOUNTER — OFFICE VISIT (OUTPATIENT)
Dept: ONCOLOGY | Facility: CLINIC | Age: 37
End: 2022-11-22

## 2022-11-22 ENCOUNTER — LAB (OUTPATIENT)
Dept: LAB | Facility: HOSPITAL | Age: 37
End: 2022-11-22

## 2022-11-22 VITALS
HEIGHT: 66 IN | SYSTOLIC BLOOD PRESSURE: 136 MMHG | OXYGEN SATURATION: 99 % | WEIGHT: 205 LBS | RESPIRATION RATE: 16 BRPM | DIASTOLIC BLOOD PRESSURE: 88 MMHG | TEMPERATURE: 97 F | BODY MASS INDEX: 32.95 KG/M2 | HEART RATE: 84 BPM

## 2022-11-22 DIAGNOSIS — D50.9 IRON DEFICIENCY ANEMIA, UNSPECIFIED IRON DEFICIENCY ANEMIA TYPE: Primary | ICD-10-CM

## 2022-11-22 LAB
BASOPHILS # BLD AUTO: 0.04 10*3/MM3 (ref 0–0.2)
BASOPHILS NFR BLD AUTO: 0.8 % (ref 0–1.5)
DEPRECATED RDW RBC AUTO: 39.1 FL (ref 37–54)
EOSINOPHIL # BLD AUTO: 0.06 10*3/MM3 (ref 0–0.4)
EOSINOPHIL NFR BLD AUTO: 1.2 % (ref 0.3–6.2)
ERYTHROCYTE [DISTWIDTH] IN BLOOD BY AUTOMATED COUNT: 12 % (ref 12.3–15.4)
HCT VFR BLD AUTO: 40.1 % (ref 34–46.6)
HGB BLD-MCNC: 13 G/DL (ref 12–15.9)
HOLD SPECIMEN: NORMAL
HOLD SPECIMEN: NORMAL
IMM GRANULOCYTES # BLD AUTO: 0.01 10*3/MM3 (ref 0–0.05)
IMM GRANULOCYTES NFR BLD AUTO: 0.2 % (ref 0–0.5)
LYMPHOCYTES # BLD AUTO: 1.58 10*3/MM3 (ref 0.7–3.1)
LYMPHOCYTES NFR BLD AUTO: 30.9 % (ref 19.6–45.3)
MCH RBC QN AUTO: 29.1 PG (ref 26.6–33)
MCHC RBC AUTO-ENTMCNC: 32.4 G/DL (ref 31.5–35.7)
MCV RBC AUTO: 89.9 FL (ref 79–97)
MONOCYTES # BLD AUTO: 0.44 10*3/MM3 (ref 0.1–0.9)
MONOCYTES NFR BLD AUTO: 8.6 % (ref 5–12)
NEUTROPHILS NFR BLD AUTO: 2.98 10*3/MM3 (ref 1.7–7)
NEUTROPHILS NFR BLD AUTO: 58.3 % (ref 42.7–76)
NRBC BLD AUTO-RTO: 0 /100 WBC (ref 0–0.2)
PLATELET # BLD AUTO: 279 10*3/MM3 (ref 140–450)
PMV BLD AUTO: 10 FL (ref 6–12)
RBC # BLD AUTO: 4.46 10*6/MM3 (ref 3.77–5.28)
WBC NRBC COR # BLD: 5.11 10*3/MM3 (ref 3.4–10.8)

## 2022-11-22 PROCEDURE — 85025 COMPLETE CBC W/AUTO DIFF WBC: CPT

## 2022-11-22 PROCEDURE — 99214 OFFICE O/P EST MOD 30 MIN: CPT | Performed by: NURSE PRACTITIONER

## 2022-11-22 PROCEDURE — 36415 COLL VENOUS BLD VENIPUNCTURE: CPT

## 2022-11-22 NOTE — PROGRESS NOTES
MG[facility] ON[facility] GROUP HEMATOLOGY & ONCOLOGY  [address]  [facility] [address] [phone]    Patient Name: Ratna Flor  Encounter Date: 11/22/2022   YOB: 1985  Patient Number: [id number]    Hematology / Oncology Progress Note    HPI / REASON FOR VISIT: Ratna Flor is a 36 y.o. female who is followed by this office for iron deficiency anemia.    She has been unable to tolerate oral iron and received IV iron with Venofer.  Last infusion was 05/06/22.      She sees GI at [place].  Her last visit there was 07/17/22.  She had Flexible Sigmoidoscopy on 07/19/22 and per note,that path was negative and no signs of IBD. MD  recommended that she get a MRE and a capsule endoscopy.  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    She presents to clinic today for continued follow up.  She continues to complain of fatigue and rectal pain. She has lost 15 pounds since April     Recent labs reviewed with patient.   Chemistry unremarkable.  CBC with hemoglobin 13.0, hematocrit 40.1 platelet count 279  Anemia profile with serum iron 31, ferritin 15, saturation 7%, TIBC 294.  Iron has continued to trend down.       LABS    Lab Results - Last 18 Months   Lab Units 11/22/22  1304 11/01/22  1413 10/04/22  1312 08/02/22  0905 04/18/22  1416 03/16/22  1355 01/13/22  1442 12/03/21  0732   HEMOGLOBIN g/dL 13.0 12.5 11.9* 12.7 12.7 13.5   < > 12.2   HEMATOCRIT % 40.1 38.5 34.6 37.6 37.4 40.2   < > 37.7   MCV fL 89.9 89.7 86.9 87.0 86.8 86.6   < > 90.4   WBC 10*3/mm3 5.11 5.95 5.55 3.90 5.87 4.46   < > 4.68   RDW % 12.0* 12.3 12.6 12.4 12.7 12.2*   < > 12.4   MPV fL 10.0 9.7 9.6 9.7 9.7 9.7   < > 9.7   PLATELETS 10*3/mm3 279 270 274 218 229 273   < > 239   IMM GRAN % % 0.2 0.3 0.2 0.3  --  0.2  --  0.4   NEUTROS ABS 10*3/mm3  2.98 3.81 3.62 2.16  --  2.19  --  2.62   LYMPHS ABS 10*3/mm3 1.58 1.56 1.43 1.08  --  1.65  --  1.40   MONOS ABS 10*3/mm3 0.44 0.50 0.43 0.56  --  0.46  --  0.56   EOS ABS 10*3/mm3 0.06 0.03 0.04 0.06  --  0.12  --  0.06   BASOS ABS 10*3/mm3 0.04 0.03 0.02 0.03  --  0.03  --  0.02   IMMATURE GRANS (ABS) 10*3/mm3 0.01 0.02 0.01 0.01  --  0.01  --  0.02   NRBC /100 WBC 0.0 0.0 0.0 0.0  --  0.0  --  0.0    < > = values in this interval not displayed.       Lab Results - Last 18 Months   Lab Units 11/01/22  1413 10/04/22  1312 08/02/22  0905 03/16/22  1355 01/13/22  1442 12/03/21  0732   GLUCOSE mg/dL 94 98 99 85 91 92   SODIUM mmol/L 139 139 139 139 139 138   POTASSIUM mmol/L 4.5 4.2 4.1 4.3 4.0 4.0   CO2 mmol/L 29.0 28.0 27.0 26.0 23.0 27.0   CHLORIDE mmol/L 102 102 105 104 105 104   ANION GAP mmol/L 8.0 9.0 7.0 9.0 11.0 7.0   CREATININE mg/dL 0.58 0.85 0.64 0.52* 0.65 0.44*   BUN mg/dL 10 9 16 11 13 11   BUN / CREAT RATIO  17.2 10.6 25.0 21.2 20.0 25.0   CALCIUM mg/dL 9.8 9.1 9.2 9.3 9.5 9.3   ALK PHOS U/L 57 53 58 59 67 61   TOTAL PROTEIN g/dL 7.3 7.1 7.0 7.1 7.1 6.3   ALT (SGPT) U/L 10 8 9 12 12 14   AST (SGOT) U/L 14 11 14 15 17 16   BILIRUBIN mg/dL 1.0 0.8 1.0 1.0 0.8 1.5*   ALBUMIN g/dL 4.80 4.60 4.30 4.50 4.40 4.20   GLOBULIN gm/dL 2.5 2.5 2.7 2.6 2.7 2.1       Lab Results - Last 18 Months   Lab Units 11/05/21  0819   CEA ng/mL 1       Lab Results - Last 18 Months   Lab Units 11/01/22  1413 10/04/22  1312 08/02/22  0905 07/19/22  1221 04/18/22  1416 03/16/22  1355 01/13/22  1442 12/03/21  0732   IRON mcg/dL 31* 39 62 71 60 112 47 112   TIBC mcg/dL 438 353 367 298 375 396 381 349   IRON SATURATION % 7* 11* 17* 24 16* 28 12* 32   FERRITIN ng/mL 15.60  --  53.72 54 47.60 57.76 54.53 78.35   TSH mcunit/mL  --   --   --  1.182  --   --   --   --    FOLATE ng/mL  --   --   --   --   --   --   --  11.30         PAST MEDICAL HISTORY:  ALLERGIES:  No Known Allergies  CURRENT MEDICATIONS:  Outpatient Encounter  Medications as of 11/22/2022   Medication Sig Dispense Refill   • albuterol sulfate  (90 Base) MCG/ACT inhaler Inhale 2 puffs Every 4 (Four) Hours As Needed for Wheezing (rinse mouth after use.). 6.7 g 0   • brompheniramine-pseudoephedrine-DM 30-2-10 MG/5ML syrup Take 5 mL by mouth 4 (Four) Times a Day As Needed for Congestion or Cough. 118 mL 0   • ferrous sulfate (FerrouSul) 325 (65 FE) MG tablet Take 1 tablet by mouth Daily With Breakfast. (Patient taking differently: Take 325 mg by mouth Every 72 (Seventy-Two) Hours. Pt takes twice weekly)     • fluticasone (Flonase) 50 MCG/ACT nasal spray 2 sprays into the nostril(s) as directed by provider Daily. 2 puffs each nostril 16 g 0   • ondansetron ODT (ZOFRAN-ODT) 8 MG disintegrating tablet Place 1 tablet on the tongue Every 8 (Eight) Hours As Needed for Nausea or Vomiting. 15 tablet 0   • pantoprazole (PROTONIX) 40 MG EC tablet TAKE 1 TABLET BY MOUTH EVERY DAY 90 tablet 1   • pantoprazole (PROTONIX) 40 MG EC tablet Take 40 mg by mouth.       No facility-administered encounter medications on file as of 11/22/2022.     ADULT ILLNESSES:  Patient Active Problem List   Diagnosis Code   • Other constipation K59.09   • Rectal bleeding K62.5   • Other hemorrhoids K64.8   • Epigastric pain R10.13   • Gastroesophageal reflux disease with esophagitis K21.00   • Nausea R11.0   • Gastrointestinal hemorrhage K92.2   • Anemia D64.9   • Iron deficiency anemia D50.9   • Abnormal CT scan abdomen R93.89   • Anemia, blood loss D50.0   • Blood in the stool K92.1   • Lower abdominal pain R10.30   • Leukopenia D72.819   • Iron deficiency E61.1   • Colitis K52.9     SURGERIES:  Past Surgical History:   Procedure Laterality Date   • CAPSULE ENDOSCOPY N/A 4/16/2021    Procedure: CAPSULE ENDOSCOPY M2A;  Surgeon: Octavia Mattson MD;  Location: [facility] ENDOSCOPY;  Service: Gastroenterology;  Laterality: N/A; Normal exam.  No blood in lumen.  Capsule seen entering the colon.   • COLONOSCOPY  N/A 5/22/2019    Non-bleeding internal hemorrhoids; The examination was otherwise normal; No specimens collected; Repeat colonoscopy at age 48 for screening purposes   • COLONOSCOPY N/A 3/18/2021    Dr. Hernandez-The examined portion of the ileum was normal; Erythematous mucosa in the rectum-biopsied; Non-bleeding internal hemorrhoids   • ENDOSCOPY N/A 6/17/2020    LA Grade A reflux esophagitis; Normal stomach-biopsied; Normal examined duodenum   • ENDOSCOPY N/A 3/18/2021    Dr. Hernandez-Z-line irregular-biopsied; Normal stomach-biopsied; Normal first portion of the duodenum and second portion of the duodenum   • WISDOM TOOTH EXTRACTION       HEALTH MAINTENANCE ITEMS:  Health Maintenance Due   Topic Date Due   • ANNUAL PHYSICAL  Never done   • PAP SMEAR  Never done   • COVID-19 Vaccine (3 - Booster for Moderna series) 05/29/2021   • INFLUENZA VACCINE  08/01/2022       <no information>  Last Completed Colonoscopy     This patient has no relevant Health Maintenance data.        There is no immunization history for the selected administration types on file for this patient.  Last Completed Mammogram     This patient has no relevant Health Maintenance data.            FAMILY HISTORY:  Family History   Problem Relation Age of Onset   • Heart defect Mother    • No Known Problems Father    • Hypertension Maternal Grandmother    • No Known Problems Maternal Grandfather    • No Known Problems Paternal Grandmother    • Prostate cancer Paternal Grandfather    • Pancreatic cancer Paternal Grandfather    • Colon cancer Neg Hx    • Colon polyps Neg Hx    • Esophageal cancer Neg Hx    • Liver cancer Neg Hx    • Stomach cancer Neg Hx    • Rectal cancer Neg Hx    • Liver disease Neg Hx      SOCIAL HISTORY:  Social History     Socioeconomic History   • Marital status:    Tobacco Use   • Smoking status: Former     Packs/day: 0.50     Years: 5.00     Pack years: 2.50     Types: Cigarettes     Quit date: 2011     Years since  "quittin.8   • Smokeless tobacco: Never   Vaping Use   • Vaping Use: Never used   Substance and Sexual Activity   • Alcohol use: Yes     Alcohol/week: 2.0 standard drinks     Types: 1 Glasses of wine, 1 Cans of beer per week     Comment: once in a blue moon liquor   • Drug use: No   • Sexual activity: Defer       REVIEW OF SYSTEMS:  Review of Systems   Constitutional: Negative for fatigue and fever.   HENT: Negative for trouble swallowing.    Respiratory: Negative for cough and shortness of breath.    Cardiovascular: Negative for chest pain, palpitations and leg swelling.   Gastrointestinal: Positive for abdominal pain, blood in stool and nausea. Negative for vomiting.   Genitourinary: Negative for hematuria.   Musculoskeletal: Negative for arthralgias and myalgias.   Skin: Negative for rash, skin lesions and wound.   Neurological: Negative for dizziness, syncope, memory problem and confusion.   Psychiatric/Behavioral: Negative for suicidal ideas and depressed mood. The patient is not nervous/anxious.        /88   Pulse 84   Temp 97 °F (36.1 °C)   Resp 16   Ht 167.6 cm (66\")   Wt 93 kg (205 lb)   SpO2 99%   BMI 33.09 kg/m²  Body surface area is 2.02 meters squared.    There were no vitals filed for this visit.    Physical Exam:  Physical Exam  Constitutional:       Appearance: Normal appearance.   HENT:      Head: Normocephalic and atraumatic.   Cardiovascular:      Rate and Rhythm: Normal rate and regular rhythm.   Pulmonary:      Effort: Pulmonary effort is normal.      Breath sounds: Normal breath sounds.   Abdominal:      General: Bowel sounds are normal.      Palpations: Abdomen is soft.   Musculoskeletal:      Right lower leg: No edema.      Left lower leg: No edema.   Skin:     General: Skin is warm and dry.   Neurological:      Mental Status: She is alert and oriented to person, place, and time.   Psychiatric:         Attention and Perception: Attention normal.         Mood and Affect: Mood " normal.         Judgment: Judgment normal.         Ratna Flor reports a pain score of 0 .  No intervention indicated.       ASSESSMENT / PLAN    1. Iron deficiency anemia, unspecified iron deficiency anemia type         ASSESSMENT:      1.  Iron Deficiency   -Last Venofer infusion was 05/06/22.  -Hemoglobin 13.0, hematocrit 40.1  -Anemia profile with serum iron 31, ferritin 15, saturation 7%, TIBC 438.  -Hgb is stable but iron is trending down and is now deficient  -Ordered Venofer 200 mg IV x 2 which ha been scheduled     2.  Lower Abdominal Pain   -She sees GI at [place].  Her last visit there was 07/17/22.    She had Flexible Sigmoidoscopy on 07/19/22 and per note,that path was negative and no signs of IBD. MD  recommended that she get a MRE and a capsule endoscopy.  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  -MRI of Pelvis on May 5, 2022 showed Persistent continuous mild circumferential anorectal wall thickening extending from the anus to the mid rectum. Differential is unchanged favoring proctitis related to infectious or inflammatory etiology.   -Complains of rectal pain for which she has been advised suppositories but pt states she hurts to bad to insert anything in her rectum.  Suggested to start with application around anus to promote healing.  -Pt advised to continue follow up per [facility] recommendations.     PLAN:  Pt will get two additional infusions of Venofer   She will have labs only in 6 weeks for CBC, CMP, Anemia Profile and Ferritin  Return to office in 3 months.  Patient will have preoffice labs for  CBC, CMP, Anemia Profile and Ferritin   Continue current medications, treatment plans and follow up with PCP and any other providers.  Care discussed with patient.  Understanding expressed.  Patient agreeable with plan.        Karena Tong, APRN  11/22/2022

## 2022-11-30 ENCOUNTER — APPOINTMENT (OUTPATIENT)
Dept: ONCOLOGY | Facility: HOSPITAL | Age: 37
End: 2022-11-30

## 2022-12-01 ENCOUNTER — APPOINTMENT (OUTPATIENT)
Dept: ONCOLOGY | Facility: HOSPITAL | Age: 37
End: 2022-12-01
Payer: COMMERCIAL

## 2022-12-02 ENCOUNTER — INFUSION (OUTPATIENT)
Dept: ONCOLOGY | Facility: HOSPITAL | Age: 37
End: 2022-12-02
Payer: COMMERCIAL

## 2022-12-02 VITALS
HEART RATE: 71 BPM | BODY MASS INDEX: 33.11 KG/M2 | HEIGHT: 66 IN | RESPIRATION RATE: 17 BRPM | WEIGHT: 206 LBS | DIASTOLIC BLOOD PRESSURE: 63 MMHG | SYSTOLIC BLOOD PRESSURE: 114 MMHG | TEMPERATURE: 97.8 F | OXYGEN SATURATION: 100 %

## 2022-12-02 DIAGNOSIS — D50.9 IRON DEFICIENCY ANEMIA, UNSPECIFIED IRON DEFICIENCY ANEMIA TYPE: ICD-10-CM

## 2022-12-02 DIAGNOSIS — E61.1 IRON DEFICIENCY: Primary | ICD-10-CM

## 2022-12-02 PROCEDURE — 25010000002 IRON SUCROSE PER 1 MG: Performed by: NURSE PRACTITIONER

## 2022-12-02 PROCEDURE — 96365 THER/PROPH/DIAG IV INF INIT: CPT

## 2022-12-02 RX ORDER — FAMOTIDINE 10 MG/ML
20 INJECTION, SOLUTION INTRAVENOUS AS NEEDED
Status: DISCONTINUED | OUTPATIENT
Start: 2022-12-02 | End: 2022-12-02 | Stop reason: HOSPADM

## 2022-12-02 RX ORDER — SODIUM CHLORIDE 9 MG/ML
250 INJECTION, SOLUTION INTRAVENOUS ONCE
Status: COMPLETED | OUTPATIENT
Start: 2022-12-02 | End: 2022-12-02

## 2022-12-02 RX ORDER — DIPHENHYDRAMINE HCL 25 MG
25 CAPSULE ORAL ONCE
Status: DISCONTINUED | OUTPATIENT
Start: 2022-12-02 | End: 2022-12-02 | Stop reason: HOSPADM

## 2022-12-02 RX ORDER — DIPHENHYDRAMINE HYDROCHLORIDE 50 MG/ML
50 INJECTION INTRAMUSCULAR; INTRAVENOUS AS NEEDED
Status: DISCONTINUED | OUTPATIENT
Start: 2022-12-02 | End: 2022-12-02 | Stop reason: HOSPADM

## 2022-12-02 RX ORDER — ACETAMINOPHEN 325 MG/1
650 TABLET ORAL ONCE
Status: COMPLETED | OUTPATIENT
Start: 2022-12-02 | End: 2022-12-02

## 2022-12-02 RX ADMIN — ACETAMINOPHEN 650 MG: 325 TABLET ORAL at 14:26

## 2022-12-02 RX ADMIN — IRON SUCROSE 200 MG: 20 INJECTION, SOLUTION INTRAVENOUS at 14:31

## 2022-12-02 RX ADMIN — SODIUM CHLORIDE 250 ML: 9 INJECTION, SOLUTION INTRAVENOUS at 14:15

## 2022-12-05 ENCOUNTER — INFUSION (OUTPATIENT)
Dept: ONCOLOGY | Facility: HOSPITAL | Age: 37
End: 2022-12-05
Payer: COMMERCIAL

## 2022-12-05 VITALS
SYSTOLIC BLOOD PRESSURE: 113 MMHG | TEMPERATURE: 97.8 F | HEIGHT: 66 IN | OXYGEN SATURATION: 99 % | WEIGHT: 205.6 LBS | BODY MASS INDEX: 33.04 KG/M2 | RESPIRATION RATE: 18 BRPM | HEART RATE: 72 BPM | DIASTOLIC BLOOD PRESSURE: 63 MMHG

## 2022-12-05 DIAGNOSIS — E61.1 IRON DEFICIENCY: Primary | ICD-10-CM

## 2022-12-05 DIAGNOSIS — D50.9 IRON DEFICIENCY ANEMIA, UNSPECIFIED IRON DEFICIENCY ANEMIA TYPE: ICD-10-CM

## 2022-12-05 PROCEDURE — 96365 THER/PROPH/DIAG IV INF INIT: CPT

## 2022-12-05 PROCEDURE — 25010000002 IRON SUCROSE PER 1 MG: Performed by: NURSE PRACTITIONER

## 2022-12-05 RX ORDER — FAMOTIDINE 10 MG/ML
20 INJECTION, SOLUTION INTRAVENOUS AS NEEDED
Status: DISCONTINUED | OUTPATIENT
Start: 2022-12-05 | End: 2022-12-05 | Stop reason: HOSPADM

## 2022-12-05 RX ORDER — DIPHENHYDRAMINE HYDROCHLORIDE 50 MG/ML
50 INJECTION INTRAMUSCULAR; INTRAVENOUS AS NEEDED
Status: DISCONTINUED | OUTPATIENT
Start: 2022-12-05 | End: 2022-12-05 | Stop reason: HOSPADM

## 2022-12-05 RX ORDER — DIPHENHYDRAMINE HCL 25 MG
25 CAPSULE ORAL ONCE
Status: DISCONTINUED | OUTPATIENT
Start: 2022-12-05 | End: 2022-12-05 | Stop reason: HOSPADM

## 2022-12-05 RX ORDER — SODIUM CHLORIDE 9 MG/ML
250 INJECTION, SOLUTION INTRAVENOUS ONCE
Status: COMPLETED | OUTPATIENT
Start: 2022-12-05 | End: 2022-12-05

## 2022-12-05 RX ORDER — ACETAMINOPHEN 325 MG/1
650 TABLET ORAL ONCE
Status: COMPLETED | OUTPATIENT
Start: 2022-12-05 | End: 2022-12-05

## 2022-12-05 RX ADMIN — SODIUM CHLORIDE 250 ML: 9 INJECTION, SOLUTION INTRAVENOUS at 15:31

## 2022-12-05 RX ADMIN — ACETAMINOPHEN 650 MG: 325 TABLET ORAL at 15:33

## 2022-12-05 RX ADMIN — IRON SUCROSE 200 MG: 20 INJECTION, SOLUTION INTRAVENOUS at 15:36

## 2022-12-13 RX ORDER — PANTOPRAZOLE SODIUM 40 MG/1
TABLET, DELAYED RELEASE ORAL
Qty: 90 TABLET | Refills: 0 | OUTPATIENT
Start: 2022-12-13

## 2022-12-13 NOTE — TELEPHONE ENCOUNTER
Duplicate request. Patient needs an OV for future refills, I tried to call the patient this morning, she did not answer and no VM picked up. Pharmacy has been notified.  Mark gonzalez

## 2022-12-13 NOTE — TELEPHONE ENCOUNTER
Last visit [facility] 11-18-21. No FU scheduled. Egd/Cln  10-7-21. [place] [address] [facility] 9-13-21 # 90 x 0. I tried to call this patient @ 8:01 am, she did not answer and no VM picked up. Patient will need to schedule her Annual yearly OV for future refills.  Mark gonzalez

## 2023-01-03 PROCEDURE — 87636 SARSCOV2 & INF A&B AMP PRB: CPT | Performed by: NURSE PRACTITIONER

## 2023-01-16 ENCOUNTER — LAB (OUTPATIENT)
Dept: LAB | Facility: HOSPITAL | Age: 38
End: 2023-01-16
Payer: COMMERCIAL

## 2023-01-16 DIAGNOSIS — D50.9 IRON DEFICIENCY ANEMIA, UNSPECIFIED IRON DEFICIENCY ANEMIA TYPE: ICD-10-CM

## 2023-01-16 LAB
ALBUMIN SERPL-MCNC: 4.4 G/DL (ref 3.5–5.2)
ALBUMIN/GLOB SERPL: 1.6 G/DL
ALP SERPL-CCNC: 53 U/L (ref 39–117)
ALT SERPL W P-5'-P-CCNC: 8 U/L (ref 1–33)
ANION GAP SERPL CALCULATED.3IONS-SCNC: 8 MMOL/L (ref 5–15)
AST SERPL-CCNC: 12 U/L (ref 1–32)
BASOPHILS # BLD AUTO: 0.03 10*3/MM3 (ref 0–0.2)
BASOPHILS NFR BLD AUTO: 0.7 % (ref 0–1.5)
BILIRUB SERPL-MCNC: 0.5 MG/DL (ref 0–1.2)
BUN SERPL-MCNC: 13 MG/DL (ref 6–20)
BUN/CREAT SERPL: 21 (ref 7–25)
CALCIUM SPEC-SCNC: 9.1 MG/DL (ref 8.6–10.5)
CHLORIDE SERPL-SCNC: 105 MMOL/L (ref 98–107)
CO2 SERPL-SCNC: 27 MMOL/L (ref 22–29)
CREAT SERPL-MCNC: 0.62 MG/DL (ref 0.57–1)
DEPRECATED RDW RBC AUTO: 39.8 FL (ref 37–54)
EGFRCR SERPLBLD CKD-EPI 2021: 116.3 ML/MIN/1.73
EOSINOPHIL # BLD AUTO: 0.03 10*3/MM3 (ref 0–0.4)
EOSINOPHIL NFR BLD AUTO: 0.7 % (ref 0.3–6.2)
ERYTHROCYTE [DISTWIDTH] IN BLOOD BY AUTOMATED COUNT: 12.2 % (ref 12.3–15.4)
FERRITIN SERPL-MCNC: 82.17 NG/ML (ref 13–150)
GLOBULIN UR ELPH-MCNC: 2.7 GM/DL
GLUCOSE SERPL-MCNC: 89 MG/DL (ref 65–99)
HCT VFR BLD AUTO: 40 % (ref 34–46.6)
HGB BLD-MCNC: 12.8 G/DL (ref 12–15.9)
IMM GRANULOCYTES # BLD AUTO: 0.01 10*3/MM3 (ref 0–0.05)
IMM GRANULOCYTES NFR BLD AUTO: 0.2 % (ref 0–0.5)
IRON 24H UR-MRATE: 46 MCG/DL (ref 37–145)
IRON SATN MFR SERPL: 13 % (ref 20–50)
LYMPHOCYTES # BLD AUTO: 1.23 10*3/MM3 (ref 0.7–3.1)
LYMPHOCYTES NFR BLD AUTO: 28.3 % (ref 19.6–45.3)
MCH RBC QN AUTO: 28.4 PG (ref 26.6–33)
MCHC RBC AUTO-ENTMCNC: 32 G/DL (ref 31.5–35.7)
MCV RBC AUTO: 88.7 FL (ref 79–97)
MONOCYTES # BLD AUTO: 0.41 10*3/MM3 (ref 0.1–0.9)
MONOCYTES NFR BLD AUTO: 9.4 % (ref 5–12)
NEUTROPHILS NFR BLD AUTO: 2.63 10*3/MM3 (ref 1.7–7)
NEUTROPHILS NFR BLD AUTO: 60.7 % (ref 42.7–76)
NRBC BLD AUTO-RTO: 0 /100 WBC (ref 0–0.2)
PLATELET # BLD AUTO: 257 10*3/MM3 (ref 140–450)
PMV BLD AUTO: 9.9 FL (ref 6–12)
POTASSIUM SERPL-SCNC: 4.3 MMOL/L (ref 3.5–5.2)
PROT SERPL-MCNC: 7.1 G/DL (ref 6–8.5)
RBC # BLD AUTO: 4.51 10*6/MM3 (ref 3.77–5.28)
SODIUM SERPL-SCNC: 140 MMOL/L (ref 136–145)
TIBC SERPL-MCNC: 346 MCG/DL (ref 298–536)
TRANSFERRIN SERPL-MCNC: 232 MG/DL (ref 200–360)
WBC NRBC COR # BLD: 4.34 10*3/MM3 (ref 3.4–10.8)

## 2023-01-16 PROCEDURE — 36415 COLL VENOUS BLD VENIPUNCTURE: CPT

## 2023-01-16 PROCEDURE — 84466 ASSAY OF TRANSFERRIN: CPT

## 2023-01-16 PROCEDURE — 83540 ASSAY OF IRON: CPT

## 2023-01-16 PROCEDURE — 82728 ASSAY OF FERRITIN: CPT

## 2023-01-16 PROCEDURE — 80053 COMPREHEN METABOLIC PANEL: CPT

## 2023-01-16 PROCEDURE — 85025 COMPLETE CBC W/AUTO DIFF WBC: CPT

## 2023-02-20 ENCOUNTER — TELEPHONE (OUTPATIENT)
Dept: ONCOLOGY | Facility: CLINIC | Age: 38
End: 2023-02-20

## 2023-02-20 ENCOUNTER — TELEPHONE (OUTPATIENT)
Dept: GASTROENTEROLOGY | Age: 38
End: 2023-02-20

## 2023-02-20 NOTE — TELEPHONE ENCOUNTER
Caller: Ratna Flor    Relationship to patient: Self    Best call back number: [phone]    Chief complaint: PT CALLED NEEDS TO RESCHEDULE    Type of visit: LAB AND FOLLOW UP    If rescheduling, when is the original appointment: 2-22-23

## 2023-02-23 ENCOUNTER — LAB (OUTPATIENT)
Dept: LAB | Facility: HOSPITAL | Age: 38
End: 2023-02-23
Payer: COMMERCIAL

## 2023-02-23 ENCOUNTER — OFFICE VISIT (OUTPATIENT)
Dept: ONCOLOGY | Facility: CLINIC | Age: 38
End: 2023-02-23
Payer: COMMERCIAL

## 2023-02-23 VITALS
DIASTOLIC BLOOD PRESSURE: 86 MMHG | OXYGEN SATURATION: 98 % | TEMPERATURE: 97.9 F | HEIGHT: 65 IN | BODY MASS INDEX: 33.74 KG/M2 | SYSTOLIC BLOOD PRESSURE: 126 MMHG | RESPIRATION RATE: 16 BRPM | WEIGHT: 202.5 LBS | HEART RATE: 88 BPM

## 2023-02-23 DIAGNOSIS — R10.84 GENERALIZED ABDOMINAL PAIN: ICD-10-CM

## 2023-02-23 DIAGNOSIS — K92.2 GASTROINTESTINAL HEMORRHAGE, UNSPECIFIED GASTROINTESTINAL HEMORRHAGE TYPE: Primary | ICD-10-CM

## 2023-02-23 DIAGNOSIS — K62.89 RECTAL PAIN: ICD-10-CM

## 2023-02-23 DIAGNOSIS — R10.30 LOWER ABDOMINAL PAIN: ICD-10-CM

## 2023-02-23 DIAGNOSIS — K62.5 RECTAL BLEEDING: ICD-10-CM

## 2023-02-23 DIAGNOSIS — E61.1 IRON DEFICIENCY: Primary | ICD-10-CM

## 2023-02-23 DIAGNOSIS — D50.9 IRON DEFICIENCY ANEMIA, UNSPECIFIED IRON DEFICIENCY ANEMIA TYPE: Primary | ICD-10-CM

## 2023-02-23 LAB
ALBUMIN SERPL-MCNC: 4.6 G/DL (ref 3.5–5.2)
ALBUMIN/GLOB SERPL: 1.9 G/DL
ALP SERPL-CCNC: 56 U/L (ref 39–117)
ALT SERPL W P-5'-P-CCNC: 7 U/L (ref 1–33)
ANION GAP SERPL CALCULATED.3IONS-SCNC: 12 MMOL/L (ref 5–15)
AST SERPL-CCNC: 12 U/L (ref 1–32)
BASOPHILS # BLD AUTO: 0.02 10*3/MM3 (ref 0–0.2)
BASOPHILS NFR BLD AUTO: 0.4 % (ref 0–1.5)
BILIRUB SERPL-MCNC: 0.9 MG/DL (ref 0–1.2)
BUN SERPL-MCNC: 12 MG/DL (ref 6–20)
BUN/CREAT SERPL: 24.5 (ref 7–25)
CALCIUM SPEC-SCNC: 9.5 MG/DL (ref 8.6–10.5)
CHLORIDE SERPL-SCNC: 103 MMOL/L (ref 98–107)
CO2 SERPL-SCNC: 26 MMOL/L (ref 22–29)
CREAT SERPL-MCNC: 0.49 MG/DL (ref 0.57–1)
DEPRECATED RDW RBC AUTO: 40.7 FL (ref 37–54)
EGFRCR SERPLBLD CKD-EPI 2021: 123.1 ML/MIN/1.73
EOSINOPHIL # BLD AUTO: 0.02 10*3/MM3 (ref 0–0.4)
EOSINOPHIL NFR BLD AUTO: 0.4 % (ref 0.3–6.2)
ERYTHROCYTE [DISTWIDTH] IN BLOOD BY AUTOMATED COUNT: 12.8 % (ref 12.3–15.4)
FERRITIN SERPL-MCNC: 56.99 NG/ML (ref 13–150)
GLOBULIN UR ELPH-MCNC: 2.4 GM/DL
GLUCOSE SERPL-MCNC: 103 MG/DL (ref 65–99)
HCT VFR BLD AUTO: 38.1 % (ref 34–46.6)
HGB BLD-MCNC: 12.7 G/DL (ref 12–15.9)
HOLD SPECIMEN: NORMAL
IMM GRANULOCYTES # BLD AUTO: 0.01 10*3/MM3 (ref 0–0.05)
IMM GRANULOCYTES NFR BLD AUTO: 0.2 % (ref 0–0.5)
IRON 24H UR-MRATE: 36 MCG/DL (ref 37–145)
IRON SATN MFR SERPL: 10 % (ref 20–50)
LYMPHOCYTES # BLD AUTO: 1.49 10*3/MM3 (ref 0.7–3.1)
LYMPHOCYTES NFR BLD AUTO: 28.8 % (ref 19.6–45.3)
MCH RBC QN AUTO: 28.9 PG (ref 26.6–33)
MCHC RBC AUTO-ENTMCNC: 33.3 G/DL (ref 31.5–35.7)
MCV RBC AUTO: 86.8 FL (ref 79–97)
MONOCYTES # BLD AUTO: 0.33 10*3/MM3 (ref 0.1–0.9)
MONOCYTES NFR BLD AUTO: 6.4 % (ref 5–12)
NEUTROPHILS NFR BLD AUTO: 3.3 10*3/MM3 (ref 1.7–7)
NEUTROPHILS NFR BLD AUTO: 63.8 % (ref 42.7–76)
NRBC BLD AUTO-RTO: 0 /100 WBC (ref 0–0.2)
PLATELET # BLD AUTO: 251 10*3/MM3 (ref 140–450)
PMV BLD AUTO: 10 FL (ref 6–12)
POTASSIUM SERPL-SCNC: 3.6 MMOL/L (ref 3.5–5.2)
PROT SERPL-MCNC: 7 G/DL (ref 6–8.5)
RBC # BLD AUTO: 4.39 10*6/MM3 (ref 3.77–5.28)
SODIUM SERPL-SCNC: 141 MMOL/L (ref 136–145)
TIBC SERPL-MCNC: 353 MCG/DL (ref 298–536)
TRANSFERRIN SERPL-MCNC: 237 MG/DL (ref 200–360)
WBC NRBC COR # BLD: 5.17 10*3/MM3 (ref 3.4–10.8)

## 2023-02-23 PROCEDURE — 99214 OFFICE O/P EST MOD 30 MIN: CPT | Performed by: NURSE PRACTITIONER

## 2023-02-23 PROCEDURE — 80053 COMPREHEN METABOLIC PANEL: CPT

## 2023-02-23 PROCEDURE — 36415 COLL VENOUS BLD VENIPUNCTURE: CPT

## 2023-02-23 PROCEDURE — 83540 ASSAY OF IRON: CPT

## 2023-02-23 PROCEDURE — 85025 COMPLETE CBC W/AUTO DIFF WBC: CPT

## 2023-02-23 PROCEDURE — 84466 ASSAY OF TRANSFERRIN: CPT

## 2023-02-23 PROCEDURE — 82728 ASSAY OF FERRITIN: CPT

## 2023-02-23 RX ORDER — DIPHENHYDRAMINE HYDROCHLORIDE 50 MG/ML
50 INJECTION INTRAMUSCULAR; INTRAVENOUS AS NEEDED
Status: CANCELLED | OUTPATIENT
Start: 2023-03-24

## 2023-02-23 RX ORDER — DIPHENHYDRAMINE HYDROCHLORIDE 50 MG/ML
50 INJECTION INTRAMUSCULAR; INTRAVENOUS AS NEEDED
Status: CANCELLED | OUTPATIENT
Start: 2023-03-10

## 2023-02-23 RX ORDER — DIPHENHYDRAMINE HCL 25 MG
25 CAPSULE ORAL ONCE
Status: CANCELLED | OUTPATIENT
Start: 2023-03-10

## 2023-02-23 RX ORDER — DIPHENHYDRAMINE HCL 25 MG
25 CAPSULE ORAL ONCE
Status: CANCELLED | OUTPATIENT
Start: 2023-03-03

## 2023-02-23 RX ORDER — ACETAMINOPHEN 325 MG/1
650 TABLET ORAL ONCE
Status: CANCELLED | OUTPATIENT
Start: 2023-03-24

## 2023-02-23 RX ORDER — FAMOTIDINE 10 MG/ML
20 INJECTION, SOLUTION INTRAVENOUS AS NEEDED
Status: CANCELLED | OUTPATIENT
Start: 2023-03-17

## 2023-02-23 RX ORDER — DIPHENHYDRAMINE HYDROCHLORIDE 50 MG/ML
50 INJECTION INTRAMUSCULAR; INTRAVENOUS AS NEEDED
Status: CANCELLED | OUTPATIENT
Start: 2023-03-03

## 2023-02-23 RX ORDER — FAMOTIDINE 10 MG/ML
20 INJECTION, SOLUTION INTRAVENOUS AS NEEDED
Status: CANCELLED | OUTPATIENT
Start: 2023-03-10

## 2023-02-23 RX ORDER — ACETAMINOPHEN 325 MG/1
650 TABLET ORAL ONCE
Status: CANCELLED | OUTPATIENT
Start: 2023-03-10

## 2023-02-23 RX ORDER — SODIUM CHLORIDE 9 MG/ML
250 INJECTION, SOLUTION INTRAVENOUS ONCE
Status: CANCELLED | OUTPATIENT
Start: 2023-03-17

## 2023-02-23 RX ORDER — DIPHENHYDRAMINE HCL 25 MG
25 CAPSULE ORAL ONCE
Status: CANCELLED | OUTPATIENT
Start: 2023-03-17

## 2023-02-23 RX ORDER — SODIUM CHLORIDE 9 MG/ML
250 INJECTION, SOLUTION INTRAVENOUS ONCE
Status: CANCELLED | OUTPATIENT
Start: 2023-03-03

## 2023-02-23 RX ORDER — FAMOTIDINE 10 MG/ML
20 INJECTION, SOLUTION INTRAVENOUS AS NEEDED
Status: CANCELLED | OUTPATIENT
Start: 2023-03-24

## 2023-02-23 RX ORDER — SODIUM CHLORIDE 9 MG/ML
250 INJECTION, SOLUTION INTRAVENOUS ONCE
Status: CANCELLED | OUTPATIENT
Start: 2023-03-10

## 2023-02-23 RX ORDER — FAMOTIDINE 10 MG/ML
20 INJECTION, SOLUTION INTRAVENOUS AS NEEDED
Status: CANCELLED | OUTPATIENT
Start: 2023-03-03

## 2023-02-23 RX ORDER — DIPHENHYDRAMINE HYDROCHLORIDE 50 MG/ML
50 INJECTION INTRAMUSCULAR; INTRAVENOUS AS NEEDED
Status: CANCELLED | OUTPATIENT
Start: 2023-03-17

## 2023-02-23 RX ORDER — SODIUM CHLORIDE 9 MG/ML
250 INJECTION, SOLUTION INTRAVENOUS ONCE
Status: CANCELLED | OUTPATIENT
Start: 2023-03-24

## 2023-02-23 RX ORDER — ACETAMINOPHEN 325 MG/1
650 TABLET ORAL ONCE
Status: CANCELLED | OUTPATIENT
Start: 2023-03-17

## 2023-02-23 RX ORDER — DIPHENHYDRAMINE HCL 25 MG
25 CAPSULE ORAL ONCE
Status: CANCELLED | OUTPATIENT
Start: 2023-03-24

## 2023-02-23 RX ORDER — ACETAMINOPHEN 325 MG/1
650 TABLET ORAL ONCE
Status: CANCELLED | OUTPATIENT
Start: 2023-03-03

## 2023-02-23 NOTE — PROGRESS NOTES
"[facility] ONC [facility] GROUP HEMATOLOGY & ONCOLOGY  [address]  [facility] [address] [phone]    Patient Name: Ratna Flor  Encounter Date: 02/23/2023   YOB: 1985  Patient Number: [id number]    Hematology / Oncology Progress Note    HPI / REASON FOR VISIT: Ratna Flor is a 37 y.o. female who is followed by this office for iron deficiency anemia.    She has been unable to tolerate oral iron and received IV iron with Venofer.  05/06/22, Dec 2 & 5, 2022.      She sees GI at [place].  Her last visit there was 07/17/22.  She had Flexible Sigmoidoscopy on 07/19/22 and per note,that path was negative and no signs of IBD. MD  recommended that she get a MRE and a capsule endoscopy.  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    INTERVAL HISTORY     She presents to clinic today for continued follow up.  Since her last visit did have a \"flare up\" of rectal bleeding which lasted approx 4-5 days.     She had labs drawn today and results were reviewed in office.       LABS    Lab Results - Last 18 Months   Lab Units 02/23/23  1343 01/16/23  1206 11/22/22  1304 11/01/22  1413 10/04/22  1312 08/02/22  0905   HEMOGLOBIN g/dL 12.7 12.8 13.0 12.5 11.9* 12.7   HEMATOCRIT % 38.1 40.0 40.1 38.5 34.6 37.6   MCV fL 86.8 88.7 89.9 89.7 86.9 87.0   WBC 10*3/mm3 5.17 4.34 5.11 5.95 5.55 3.90   RDW % 12.8 12.2* 12.0* 12.3 12.6 12.4   MPV fL 10.0 9.9 10.0 9.7 9.6 9.7   PLATELETS 10*3/mm3 251 257 279 270 274 218   IMM GRAN % % 0.2 0.2 0.2 0.3 0.2 0.3   NEUTROS ABS 10*3/mm3 3.30 2.63 2.98 3.81 3.62 2.16   LYMPHS ABS 10*3/mm3 1.49 1.23 1.58 1.56 1.43 1.08   MONOS ABS 10*3/mm3 0.33 0.41 0.44 0.50 0.43 0.56   EOS ABS 10*3/mm3 0.02 0.03 0.06 0.03 0.04 0.06   BASOS ABS 10*3/mm3 0.02 0.03 0.04 0.03 0.02 0.03   IMMATURE GRANS (ABS) " 10*3/mm3 0.01 0.01 0.01 0.02 0.01 0.01   NRBC /100 WBC 0.0 0.0 0.0 0.0 0.0 0.0       Lab Results - Last 18 Months   Lab Units 01/16/23  1206 11/01/22  1413 10/04/22  1312 08/02/22  0905 03/16/22  1355 01/13/22  1442   GLUCOSE mg/dL 89 94 98 99 85 91   SODIUM mmol/L 140 139 139 139 139 139   POTASSIUM mmol/L 4.3 4.5 4.2 4.1 4.3 4.0   CO2 mmol/L 27.0 29.0 28.0 27.0 26.0 23.0   CHLORIDE mmol/L 105 102 102 105 104 105   ANION GAP mmol/L 8.0 8.0 9.0 7.0 9.0 11.0   CREATININE mg/dL 0.62 0.58 0.85 0.64 0.52* 0.65   BUN mg/dL 13 10 9 16 11 13   BUN / CREAT RATIO  21.0 17.2 10.6 25.0 21.2 20.0   CALCIUM mg/dL 9.1 9.8 9.1 9.2 9.3 9.5   ALK PHOS U/L 53 57 53 58 59 67   TOTAL PROTEIN g/dL 7.1 7.3 7.1 7.0 7.1 7.1   ALT (SGPT) U/L 8 10 8 9 12 12   AST (SGOT) U/L 12 14 11 14 15 17   BILIRUBIN mg/dL 0.5 1.0 0.8 1.0 1.0 0.8   ALBUMIN g/dL 4.4 4.80 4.60 4.30 4.50 4.40   GLOBULIN gm/dL 2.7 2.5 2.5 2.7 2.6 2.7       Lab Results - Last 18 Months   Lab Units 11/05/21  0819   CEA ng/mL 1       Lab Results - Last 18 Months   Lab Units 01/16/23  1206 11/01/22  1413 10/04/22  1312 08/02/22  0905 07/19/22  1221 04/18/22  1416 03/16/22  1355 01/13/22  1442 12/03/21  0732   IRON mcg/dL 46 31* 39 62 71 60 112   < > 112   TIBC mcg/dL 346 438 353 367 298 375 396   < > 349   IRON SATURATION % 13* 7* 11* 17* 24 16* 28   < > 32   FERRITIN ng/mL 82.17 15.60  --  53.72 54 47.60 57.76   < > 78.35   TSH mcunit/mL  --   --   --   --  1.182  --   --   --   --    FOLATE ng/mL  --   --   --   --   --   --   --   --  11.30    < > = values in this interval not displayed.         PAST MEDICAL HISTORY:  ALLERGIES:  No Known Allergies  CURRENT MEDICATIONS:  Outpatient Encounter Medications as of 2/23/2023   Medication Sig Dispense Refill   • ferrous sulfate (FerrouSul) 325 (65 FE) MG tablet Take 1 tablet by mouth Daily With Breakfast. (Patient taking differently: Take 325 mg by mouth Every 72 (Seventy-Two) Hours. Pt takes twice weekly)     • ondansetron ODT  (ZOFRAN-ODT) 8 MG disintegrating tablet Place 1 tablet on the tongue Every 8 (Eight) Hours As Needed for Nausea or Vomiting. 15 tablet 0   • fluticasone (Flonase) 50 MCG/ACT nasal spray 2 sprays into the nostril(s) as directed by provider Daily. 2 puffs each nostril 16 g 0     No facility-administered encounter medications on file as of 2/23/2023.     ADULT ILLNESSES:  Patient Active Problem List   Diagnosis Code   • Other constipation K59.09   • Rectal bleeding K62.5   • Other hemorrhoids K64.8   • Epigastric pain R10.13   • Gastroesophageal reflux disease with esophagitis K21.00   • Nausea R11.0   • Gastrointestinal hemorrhage K92.2   • Anemia D64.9   • Iron deficiency anemia D50.9   • Abnormal CT scan abdomen R93.89   • Anemia, blood loss D50.0   • Blood in the stool K92.1   • Lower abdominal pain R10.30   • Leukopenia D72.819   • Iron deficiency E61.1   • Colitis K52.9     SURGERIES:  Past Surgical History:   Procedure Laterality Date   • CAPSULE ENDOSCOPY N/A 4/16/2021    Procedure: CAPSULE ENDOSCOPY M2A;  Surgeon: Octavia Mattson MD;  Location: [facility] ENDOSCOPY;  Service: Gastroenterology;  Laterality: N/A; Normal exam.  No blood in lumen.  Capsule seen entering the colon.   • COLONOSCOPY N/A 5/22/2019    Non-bleeding internal hemorrhoids; The examination was otherwise normal; No specimens collected; Repeat colonoscopy at age 48 for screening purposes   • COLONOSCOPY N/A 3/18/2021    Dr. Hernandez-The examined portion of the ileum was normal; Erythematous mucosa in the rectum-biopsied; Non-bleeding internal hemorrhoids   • ENDOSCOPY N/A 6/17/2020    LA Grade A reflux esophagitis; Normal stomach-biopsied; Normal examined duodenum   • ENDOSCOPY N/A 3/18/2021    Dr. Hernandez-Z-line irregular-biopsied; Normal stomach-biopsied; Normal first portion of the duodenum and second portion of the duodenum   • WISDOM TOOTH EXTRACTION       HEALTH MAINTENANCE ITEMS:  Health Maintenance Due   Topic Date Due   • ANNUAL  PHYSICAL  Never done   • PAP SMEAR  Never done   • COVID-19 Vaccine (3 - Booster for Moderna series) 2021   • INFLUENZA VACCINE  2022       <no information>  Last Completed Colonoscopy     This patient has no relevant Health Maintenance data.        There is no immunization history for the selected administration types on file for this patient.  Last Completed Mammogram     This patient has no relevant Health Maintenance data.            FAMILY HISTORY:  Family History   Problem Relation Age of Onset   • Heart defect Mother    • No Known Problems Father    • Hypertension Maternal Grandmother    • No Known Problems Maternal Grandfather    • No Known Problems Paternal Grandmother    • Prostate cancer Paternal Grandfather    • Pancreatic cancer Paternal Grandfather    • Colon cancer Neg Hx    • Colon polyps Neg Hx    • Esophageal cancer Neg Hx    • Liver cancer Neg Hx    • Stomach cancer Neg Hx    • Rectal cancer Neg Hx    • Liver disease Neg Hx      SOCIAL HISTORY:  Social History     Socioeconomic History   • Marital status:    Tobacco Use   • Smoking status: Former     Packs/day: 0.50     Years: 5.00     Pack years: 2.50     Types: Cigarettes     Quit date:      Years since quittin.1   • Smokeless tobacco: Never   Vaping Use   • Vaping Use: Never used   Substance and Sexual Activity   • Alcohol use: Yes     Alcohol/week: 2.0 standard drinks     Types: 1 Glasses of wine, 1 Cans of beer per week     Comment: once in a blue moon liquor   • Drug use: No   • Sexual activity: Defer       REVIEW OF SYSTEMS:  Review of Systems   Constitutional: Negative for fatigue and fever.   HENT: Negative for trouble swallowing.    Respiratory: Negative for cough and shortness of breath.    Cardiovascular: Negative for chest pain, palpitations and leg swelling.   Gastrointestinal: Positive for abdominal pain, blood in stool and nausea. Negative for vomiting.   Genitourinary: Negative for hematuria.  "  Musculoskeletal: Negative for arthralgias and myalgias.   Skin: Negative for rash, skin lesions and wound.   Neurological: Negative for dizziness, syncope, memory problem and confusion.   Psychiatric/Behavioral: Negative for suicidal ideas and depressed mood. The patient is not nervous/anxious.        /86   Pulse 88   Temp 97.9 °F (36.6 °C)   Resp 16   Ht 165.1 cm (65\")   Wt 91.9 kg (202 lb 8 oz)   SpO2 98%   BMI 33.70 kg/m²  Body surface area is 1.99 meters squared.    Pain Score    02/23/23 1400   PainSc: 0-No pain         Physical Exam  Constitutional:       Appearance: Normal appearance.   HENT:      Head: Normocephalic and atraumatic.   Cardiovascular:      Rate and Rhythm: Normal rate and regular rhythm.   Pulmonary:      Effort: Pulmonary effort is normal.      Breath sounds: Normal breath sounds.   Abdominal:      General: Bowel sounds are normal.      Palpations: Abdomen is soft.   Musculoskeletal:      Right lower leg: No edema.      Left lower leg: No edema.   Skin:     General: Skin is warm and dry.   Neurological:      Mental Status: She is alert and oriented to person, place, and time.   Psychiatric:         Attention and Perception: Attention normal.         Mood and Affect: Mood normal.         Judgment: Judgment normal.         Ratna Flor reports a pain score of 0 .  No intervention indicated.       ASSESSMENT / PLAN    No diagnosis found.     ASSESSMENT:      1.  Iron Deficiency   -Venofer infusion was 05/06/22, Dec 2 and 5, 2022.    -Iron 36, Ferritin 56, Sat 10%, TIBC 353  -Hgb 12.7, Hct 38.1        2.  Lower Abdominal Pain   3.  Rectal Bleeding   -She sees GI at [place].    -She requested referral to GI in [place] which we have made    PLAN:  Pt will get 4 additional infusions of Venofer   She will have labs only in 6 weeks for CBC, CMP, Anemia Profile and Ferritin  Return to office in 3 months.  Patient will have preoffice labs for  CBC, CMP, Anemia Profile and " Ferritin   Continue current medications, treatment plans and follow up with PCP and any other providers.  Care discussed with patient.  Understanding expressed.  Patient agreeable with plan.        Karena Tong, APRN  02/23/2023

## 2023-02-24 ENCOUNTER — TELEPHONE (OUTPATIENT)
Dept: ONCOLOGY | Facility: CLINIC | Age: 38
End: 2023-02-24

## 2023-02-24 NOTE — TELEPHONE ENCOUNTER
Caller: Ratna Flor    Relationship to patient: Self    Best call back number: [phone]    Type of visit: INFUSION    Requested date: SAME DAY BUT AFTER 1:30PM    If rescheduling, when is the original appointment: 03/10    Additional notes: PLEASE CALL ONCE R/S.

## 2023-03-03 ENCOUNTER — INFUSION (OUTPATIENT)
Dept: ONCOLOGY | Facility: HOSPITAL | Age: 38
End: 2023-03-03
Payer: COMMERCIAL

## 2023-03-03 VITALS
OXYGEN SATURATION: 100 % | DIASTOLIC BLOOD PRESSURE: 56 MMHG | TEMPERATURE: 97.7 F | RESPIRATION RATE: 18 BRPM | BODY MASS INDEX: 31.66 KG/M2 | HEART RATE: 90 BPM | WEIGHT: 197 LBS | SYSTOLIC BLOOD PRESSURE: 111 MMHG | HEIGHT: 66 IN

## 2023-03-03 DIAGNOSIS — D50.9 IRON DEFICIENCY ANEMIA, UNSPECIFIED IRON DEFICIENCY ANEMIA TYPE: ICD-10-CM

## 2023-03-03 DIAGNOSIS — E61.1 IRON DEFICIENCY: Primary | ICD-10-CM

## 2023-03-03 PROCEDURE — 96365 THER/PROPH/DIAG IV INF INIT: CPT

## 2023-03-03 PROCEDURE — 25010000002 IRON SUCROSE PER 1 MG: Performed by: NURSE PRACTITIONER

## 2023-03-03 RX ORDER — ACETAMINOPHEN 325 MG/1
650 TABLET ORAL ONCE
Status: COMPLETED | OUTPATIENT
Start: 2023-03-03 | End: 2023-03-03

## 2023-03-03 RX ORDER — FAMOTIDINE 10 MG/ML
20 INJECTION, SOLUTION INTRAVENOUS AS NEEDED
Status: DISCONTINUED | OUTPATIENT
Start: 2023-03-03 | End: 2023-03-03 | Stop reason: HOSPADM

## 2023-03-03 RX ORDER — DIPHENHYDRAMINE HCL 25 MG
25 CAPSULE ORAL ONCE
Status: DISCONTINUED | OUTPATIENT
Start: 2023-03-03 | End: 2023-03-03 | Stop reason: HOSPADM

## 2023-03-03 RX ORDER — DIPHENHYDRAMINE HYDROCHLORIDE 50 MG/ML
50 INJECTION INTRAMUSCULAR; INTRAVENOUS AS NEEDED
Status: DISCONTINUED | OUTPATIENT
Start: 2023-03-03 | End: 2023-03-03 | Stop reason: HOSPADM

## 2023-03-03 RX ORDER — SODIUM CHLORIDE 9 MG/ML
250 INJECTION, SOLUTION INTRAVENOUS ONCE
Status: COMPLETED | OUTPATIENT
Start: 2023-03-03 | End: 2023-03-03

## 2023-03-03 RX ADMIN — SODIUM CHLORIDE 200 MG: 9 INJECTION, SOLUTION INTRAVENOUS at 09:22

## 2023-03-03 RX ADMIN — ACETAMINOPHEN 650 MG: 325 TABLET, FILM COATED ORAL at 09:19

## 2023-03-03 RX ADMIN — SODIUM CHLORIDE 250 ML: 9 INJECTION, SOLUTION INTRAVENOUS at 09:19

## 2023-03-10 ENCOUNTER — INFUSION (OUTPATIENT)
Dept: ONCOLOGY | Facility: HOSPITAL | Age: 38
End: 2023-03-10
Payer: COMMERCIAL

## 2023-03-10 VITALS
OXYGEN SATURATION: 100 % | WEIGHT: 198.4 LBS | SYSTOLIC BLOOD PRESSURE: 105 MMHG | TEMPERATURE: 98.6 F | RESPIRATION RATE: 18 BRPM | HEIGHT: 66 IN | HEART RATE: 66 BPM | BODY MASS INDEX: 31.88 KG/M2 | DIASTOLIC BLOOD PRESSURE: 68 MMHG

## 2023-03-10 DIAGNOSIS — E61.1 IRON DEFICIENCY: Primary | ICD-10-CM

## 2023-03-10 DIAGNOSIS — D50.9 IRON DEFICIENCY ANEMIA, UNSPECIFIED IRON DEFICIENCY ANEMIA TYPE: ICD-10-CM

## 2023-03-10 PROCEDURE — 25010000002 IRON SUCROSE PER 1 MG: Performed by: NURSE PRACTITIONER

## 2023-03-10 PROCEDURE — 96365 THER/PROPH/DIAG IV INF INIT: CPT

## 2023-03-10 RX ORDER — ACETAMINOPHEN 325 MG/1
650 TABLET ORAL ONCE
Status: COMPLETED | OUTPATIENT
Start: 2023-03-10 | End: 2023-03-10

## 2023-03-10 RX ORDER — SODIUM CHLORIDE 9 MG/ML
250 INJECTION, SOLUTION INTRAVENOUS ONCE
Status: COMPLETED | OUTPATIENT
Start: 2023-03-10 | End: 2023-03-10

## 2023-03-10 RX ADMIN — SODIUM CHLORIDE 250 ML: 9 INJECTION, SOLUTION INTRAVENOUS at 14:17

## 2023-03-10 RX ADMIN — ACETAMINOPHEN 650 MG: 325 TABLET, FILM COATED ORAL at 14:16

## 2023-03-10 RX ADMIN — SODIUM CHLORIDE 200 MG: 9 INJECTION, SOLUTION INTRAVENOUS at 14:19

## 2023-03-17 ENCOUNTER — INFUSION (OUTPATIENT)
Dept: ONCOLOGY | Facility: HOSPITAL | Age: 38
End: 2023-03-17
Payer: COMMERCIAL

## 2023-03-17 VITALS
SYSTOLIC BLOOD PRESSURE: 99 MMHG | HEIGHT: 66 IN | RESPIRATION RATE: 16 BRPM | DIASTOLIC BLOOD PRESSURE: 60 MMHG | BODY MASS INDEX: 31.82 KG/M2 | TEMPERATURE: 97.9 F | HEART RATE: 71 BPM | OXYGEN SATURATION: 95 % | WEIGHT: 198 LBS

## 2023-03-17 DIAGNOSIS — D50.9 IRON DEFICIENCY ANEMIA, UNSPECIFIED IRON DEFICIENCY ANEMIA TYPE: ICD-10-CM

## 2023-03-17 DIAGNOSIS — E61.1 IRON DEFICIENCY: Primary | ICD-10-CM

## 2023-03-17 PROCEDURE — 25010000002 IRON SUCROSE PER 1 MG: Performed by: NURSE PRACTITIONER

## 2023-03-17 PROCEDURE — 96365 THER/PROPH/DIAG IV INF INIT: CPT

## 2023-03-17 RX ORDER — ACETAMINOPHEN 325 MG/1
650 TABLET ORAL ONCE
Status: COMPLETED | OUTPATIENT
Start: 2023-03-17 | End: 2023-03-17

## 2023-03-17 RX ORDER — SODIUM CHLORIDE 9 MG/ML
250 INJECTION, SOLUTION INTRAVENOUS ONCE
Status: COMPLETED | OUTPATIENT
Start: 2023-03-17 | End: 2023-03-17

## 2023-03-17 RX ORDER — DIPHENHYDRAMINE HCL 25 MG
25 CAPSULE ORAL ONCE
Status: DISCONTINUED | OUTPATIENT
Start: 2023-03-17 | End: 2023-03-17 | Stop reason: HOSPADM

## 2023-03-17 RX ORDER — DIPHENHYDRAMINE HYDROCHLORIDE 50 MG/ML
50 INJECTION INTRAMUSCULAR; INTRAVENOUS AS NEEDED
Status: DISCONTINUED | OUTPATIENT
Start: 2023-03-17 | End: 2023-03-17 | Stop reason: HOSPADM

## 2023-03-17 RX ORDER — FAMOTIDINE 10 MG/ML
20 INJECTION, SOLUTION INTRAVENOUS AS NEEDED
Status: DISCONTINUED | OUTPATIENT
Start: 2023-03-17 | End: 2023-03-17 | Stop reason: HOSPADM

## 2023-03-17 RX ADMIN — SODIUM CHLORIDE 200 MG: 9 INJECTION, SOLUTION INTRAVENOUS at 09:11

## 2023-03-17 RX ADMIN — ACETAMINOPHEN 650 MG: 325 TABLET ORAL at 09:10

## 2023-03-17 RX ADMIN — SODIUM CHLORIDE 250 ML: 9 INJECTION, SOLUTION INTRAVENOUS at 09:09

## 2023-03-24 ENCOUNTER — INFUSION (OUTPATIENT)
Dept: ONCOLOGY | Facility: HOSPITAL | Age: 38
End: 2023-03-24
Payer: COMMERCIAL

## 2023-03-24 VITALS
TEMPERATURE: 96.7 F | WEIGHT: 198 LBS | BODY MASS INDEX: 31.82 KG/M2 | DIASTOLIC BLOOD PRESSURE: 53 MMHG | SYSTOLIC BLOOD PRESSURE: 98 MMHG | HEIGHT: 66 IN | RESPIRATION RATE: 18 BRPM | OXYGEN SATURATION: 100 % | HEART RATE: 66 BPM

## 2023-03-24 DIAGNOSIS — E61.1 IRON DEFICIENCY: Primary | ICD-10-CM

## 2023-03-24 DIAGNOSIS — D50.9 IRON DEFICIENCY ANEMIA, UNSPECIFIED IRON DEFICIENCY ANEMIA TYPE: ICD-10-CM

## 2023-03-24 PROCEDURE — 96365 THER/PROPH/DIAG IV INF INIT: CPT

## 2023-03-24 PROCEDURE — 25010000002 IRON SUCROSE PER 1 MG: Performed by: NURSE PRACTITIONER

## 2023-03-24 RX ORDER — SODIUM CHLORIDE 9 MG/ML
250 INJECTION, SOLUTION INTRAVENOUS ONCE
Status: COMPLETED | OUTPATIENT
Start: 2023-03-24 | End: 2023-03-24

## 2023-03-24 RX ORDER — ACETAMINOPHEN 325 MG/1
650 TABLET ORAL ONCE
Status: COMPLETED | OUTPATIENT
Start: 2023-03-24 | End: 2023-03-24

## 2023-03-24 RX ADMIN — ACETAMINOPHEN 650 MG: 325 TABLET ORAL at 09:34

## 2023-03-24 RX ADMIN — SODIUM CHLORIDE 250 ML: 9 INJECTION, SOLUTION INTRAVENOUS at 09:36

## 2023-03-24 RX ADMIN — SODIUM CHLORIDE 200 MG: 9 INJECTION, SOLUTION INTRAVENOUS at 09:37

## 2023-04-06 ENCOUNTER — LAB (OUTPATIENT)
Dept: LAB | Facility: HOSPITAL | Age: 38
End: 2023-04-06
Payer: COMMERCIAL

## 2023-04-06 DIAGNOSIS — E61.1 IRON DEFICIENCY: ICD-10-CM

## 2023-04-06 LAB
ALBUMIN SERPL-MCNC: 4.7 G/DL (ref 3.5–5.2)
ALBUMIN/GLOB SERPL: 1.8 G/DL
ALP SERPL-CCNC: 52 U/L (ref 39–117)
ALT SERPL W P-5'-P-CCNC: 6 U/L (ref 1–33)
ANION GAP SERPL CALCULATED.3IONS-SCNC: 11 MMOL/L (ref 5–15)
AST SERPL-CCNC: 13 U/L (ref 1–32)
BASOPHILS # BLD AUTO: 0.03 10*3/MM3 (ref 0–0.2)
BASOPHILS NFR BLD AUTO: 0.6 % (ref 0–1.5)
BILIRUB SERPL-MCNC: 1.1 MG/DL (ref 0–1.2)
BUN SERPL-MCNC: 13 MG/DL (ref 6–20)
BUN/CREAT SERPL: 20.3 (ref 7–25)
CALCIUM SPEC-SCNC: 9 MG/DL (ref 8.6–10.5)
CHLORIDE SERPL-SCNC: 105 MMOL/L (ref 98–107)
CO2 SERPL-SCNC: 25 MMOL/L (ref 22–29)
CREAT SERPL-MCNC: 0.64 MG/DL (ref 0.57–1)
DEPRECATED RDW RBC AUTO: 39.6 FL (ref 37–54)
EGFRCR SERPLBLD CKD-EPI 2021: 115.5 ML/MIN/1.73
EOSINOPHIL # BLD AUTO: 0.03 10*3/MM3 (ref 0–0.4)
EOSINOPHIL NFR BLD AUTO: 0.6 % (ref 0.3–6.2)
ERYTHROCYTE [DISTWIDTH] IN BLOOD BY AUTOMATED COUNT: 12.1 % (ref 12.3–15.4)
FERRITIN SERPL-MCNC: 262.4 NG/ML (ref 13–150)
GLOBULIN UR ELPH-MCNC: 2.6 GM/DL
GLUCOSE SERPL-MCNC: 86 MG/DL (ref 65–99)
HCT VFR BLD AUTO: 38 % (ref 34–46.6)
HGB BLD-MCNC: 12.5 G/DL (ref 12–15.9)
IMM GRANULOCYTES # BLD AUTO: 0.01 10*3/MM3 (ref 0–0.05)
IMM GRANULOCYTES NFR BLD AUTO: 0.2 % (ref 0–0.5)
IRON 24H UR-MRATE: 69 MCG/DL (ref 37–145)
IRON SATN MFR SERPL: 23 % (ref 20–50)
LYMPHOCYTES # BLD AUTO: 1.51 10*3/MM3 (ref 0.7–3.1)
LYMPHOCYTES NFR BLD AUTO: 29.1 % (ref 19.6–45.3)
MCH RBC QN AUTO: 29.6 PG (ref 26.6–33)
MCHC RBC AUTO-ENTMCNC: 32.9 G/DL (ref 31.5–35.7)
MCV RBC AUTO: 90 FL (ref 79–97)
MONOCYTES # BLD AUTO: 0.44 10*3/MM3 (ref 0.1–0.9)
MONOCYTES NFR BLD AUTO: 8.5 % (ref 5–12)
NEUTROPHILS NFR BLD AUTO: 3.17 10*3/MM3 (ref 1.7–7)
NEUTROPHILS NFR BLD AUTO: 61 % (ref 42.7–76)
NRBC BLD AUTO-RTO: 0 /100 WBC (ref 0–0.2)
PLATELET # BLD AUTO: 243 10*3/MM3 (ref 140–450)
PMV BLD AUTO: 9.8 FL (ref 6–12)
POTASSIUM SERPL-SCNC: 4.3 MMOL/L (ref 3.5–5.2)
PROT SERPL-MCNC: 7.3 G/DL (ref 6–8.5)
RBC # BLD AUTO: 4.22 10*6/MM3 (ref 3.77–5.28)
SODIUM SERPL-SCNC: 141 MMOL/L (ref 136–145)
TIBC SERPL-MCNC: 304 MCG/DL (ref 298–536)
TRANSFERRIN SERPL-MCNC: 204 MG/DL (ref 200–360)
WBC NRBC COR # BLD: 5.19 10*3/MM3 (ref 3.4–10.8)

## 2023-04-06 PROCEDURE — 36415 COLL VENOUS BLD VENIPUNCTURE: CPT

## 2023-04-06 PROCEDURE — 80053 COMPREHEN METABOLIC PANEL: CPT

## 2023-04-06 PROCEDURE — 84466 ASSAY OF TRANSFERRIN: CPT

## 2023-04-06 PROCEDURE — 83540 ASSAY OF IRON: CPT

## 2023-04-06 PROCEDURE — 85025 COMPLETE CBC W/AUTO DIFF WBC: CPT

## 2023-04-06 PROCEDURE — 82728 ASSAY OF FERRITIN: CPT

## 2023-05-15 PROCEDURE — 87070 CULTURE OTHR SPECIMN AEROBIC: CPT | Performed by: NURSE PRACTITIONER

## 2023-05-15 PROCEDURE — 87205 SMEAR GRAM STAIN: CPT | Performed by: NURSE PRACTITIONER

## 2023-05-19 ENCOUNTER — TRANSCRIBE ORDERS (OUTPATIENT)
Dept: ADMINISTRATIVE | Facility: HOSPITAL | Age: 38
End: 2023-05-19
Payer: COMMERCIAL

## 2023-05-19 ENCOUNTER — HOSPITAL ENCOUNTER (OUTPATIENT)
Dept: GENERAL RADIOLOGY | Facility: HOSPITAL | Age: 38
Discharge: HOME | End: 2023-05-19
Payer: COMMERCIAL

## 2023-05-19 ENCOUNTER — OFFICE VISIT (OUTPATIENT)
Dept: WOUND CARE | Facility: HOSPITAL | Age: 38
End: 2023-05-19
Payer: COMMERCIAL

## 2023-05-19 DIAGNOSIS — L97.522 ULCER OF LEFT FOOT, WITH FAT LAYER EXPOSED: Primary | ICD-10-CM

## 2023-05-19 DIAGNOSIS — L97.522 ULCER OF LEFT FOOT, WITH FAT LAYER EXPOSED: ICD-10-CM

## 2023-05-19 PROCEDURE — G0463 HOSPITAL OUTPT CLINIC VISIT: HCPCS

## 2023-05-19 PROCEDURE — 73630 X-RAY EXAM OF FOOT: CPT

## 2023-05-24 NOTE — PROGRESS NOTES
[facility] HEMATOLOGY & ONCOLOGY  [address]  [facility] [address] [phone]    Patient Name: Ratna Flor  Encounter Date: 05/25/2023   YOB: 1985  Patient Number: [id number]    Hematology / Oncology Progress Note    HPI / REASON FOR VISIT: Ratna Flor is a 37 y.o. female who is followed by this office for iron deficiency anemia.    She has been unable to tolerate oral iron and received IV iron with Venofer      She sees GI at [place].  Her last visit there was 07/17/22.  She had Flexible Sigmoidoscopy on 07/19/22 and per note,that path was negative and no signs of IBD. MD  recommended that she get a MRE and a capsule endoscopy.  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    INTERVAL HISTORY     She presents to clinic today for continued follow up..   She Venofer infusions:  March 3, 10, 17 and 24, 2023 which she tolerated well.   She has been seen by GI in [place].  She is scheduled for upper and lower scope June 19.  Currently taking Keflex for wound to her foot.      She had labs drawn today and results were reviewed in office.       LABS    Lab Results - Last 18 Months   Lab Units 05/25/23  0928 04/06/23  1402 02/23/23  1343 01/16/23  1206 11/22/22  1304 11/01/22  1413   HEMOGLOBIN g/dL 12.7 12.5 12.7 12.8 13.0 12.5   HEMATOCRIT % 39.0 38.0 38.1 40.0 40.1 38.5   MCV fL 90.3 90.0 86.8 88.7 89.9 89.7   WBC 10*3/mm3 3.88 5.19 5.17 4.34 5.11 5.95   RDW % 11.8* 12.1* 12.8 12.2* 12.0* 12.3   MPV fL 9.6 9.8 10.0 9.9 10.0 9.7   PLATELETS 10*3/mm3 207 243 251 257 279 270   IMM GRAN % % 0.0 0.2 0.2 0.2 0.2 0.3   NEUTROS ABS 10*3/mm3 2.26 3.17 3.30 2.63 2.98 3.81   LYMPHS ABS 10*3/mm3 1.21 1.51 1.49 1.23 1.58 1.56   MONOS ABS 10*3/mm3 0.36 0.44 0.33 0.41 0.44 0.50   EOS ABS 10*3/mm3 0.02  0.03 0.02 0.03 0.06 0.03   BASOS ABS 10*3/mm3 0.03 0.03 0.02 0.03 0.04 0.03   IMMATURE GRANS (ABS) 10*3/mm3 0.00 0.01 0.01 0.01 0.01 0.02   NRBC /100 WBC 0.0 0.0 0.0 0.0 0.0 0.0       Lab Results - Last 18 Months   Lab Units 05/25/23  0928 04/06/23  1402 02/23/23  1343 01/16/23  1206 11/01/22  1413 10/04/22  1312   GLUCOSE mg/dL 98 86 103* 89 94 98   SODIUM mmol/L 137 141 141 140 139 139   POTASSIUM mmol/L 4.1 4.3 3.6 4.3 4.5 4.2   CO2 mmol/L 24.0 25.0 26.0 27.0 29.0 28.0   CHLORIDE mmol/L 104 105 103 105 102 102   ANION GAP mmol/L 9.0 11.0 12.0 8.0 8.0 9.0   CREATININE mg/dL 0.53* 0.64 0.49* 0.62 0.58 0.85   BUN mg/dL 11 13 12 13 10 9   BUN / CREAT RATIO  20.8 20.3 24.5 21.0 17.2 10.6   CALCIUM mg/dL 8.6 9.0 9.5 9.1 9.8 9.1   ALK PHOS U/L 44 52 56 53 57 53   TOTAL PROTEIN g/dL 7.1 7.3 7.0 7.1 7.3 7.1   ALT (SGPT) U/L 8 6 7 8 10 8   AST (SGOT) U/L 10 13 12 12 14 11   BILIRUBIN mg/dL 1.5* 1.1 0.9 0.5 1.0 0.8   ALBUMIN g/dL 4.6 4.7 4.6 4.4 4.80 4.60   GLOBULIN gm/dL 2.5 2.6 2.4 2.7 2.5 2.5       No results for input(s): MSPIKE, KAPPALAMB, IGLFLC, URICACID, FREEKAPPAL, CEA, LDH, REFLABREPO in the last 18084 hours.      Lab Results - Last 18 Months   Lab Units 05/25/23  0928 04/06/23  1402 02/23/23  1343 01/16/23  1206 11/01/22  1413 10/04/22  1312 08/02/22  0905 07/19/22  1221 01/13/22  1442 12/03/21  0732   IRON mcg/dL 136 69 36* 46 31* 39 62 71   < > 112   TIBC mcg/dL 320 304 353 346 438 353 367 298   < > 349   IRON SATURATION % 42 23 10* 13* 7* 11* 17* 24   < > 32   FERRITIN ng/mL 159.50* 262.40* 56.99 82.17 15.60  --  53.72 54   < > 78.35   TSH mcunit/mL  --   --   --   --   --   --   --  1.182  --   --    FOLATE ng/mL  --   --   --   --   --   --   --   --   --  11.30    < > = values in this interval not displayed.         PAST MEDICAL HISTORY:  ALLERGIES:  No Known Allergies  CURRENT MEDICATIONS:  Outpatient Encounter Medications as of 5/25/2023   Medication Sig Dispense Refill    ferrous sulfate (FerrouSul) 325  (65 FE) MG tablet Take 1 tablet by mouth Daily With Breakfast. (Patient taking differently: Take 1 tablet by mouth Every 72 (Seventy-Two) Hours. Pt takes twice weekly)      ondansetron ODT (ZOFRAN-ODT) 8 MG disintegrating tablet Place 1 tablet on the tongue Every 8 (Eight) Hours As Needed for Nausea or Vomiting. 15 tablet 0    pantoprazole (PROTONIX) 40 MG EC tablet TAKE 1 TABLET DAILY UPON AWAKENING, THEN EAT/PROTEIN 30 MINUTES LATER.      triamcinolone (KENALOG) 0.1 % ointment Apply 1 application topically to the appropriate area as directed 2 (Two) Times a Day. 22 g 0    [] cephalexin (KEFLEX) 500 MG capsule Take 1 capsule by mouth 2 (Two) Times a Day for 7 days. 14 capsule 0     No facility-administered encounter medications on file as of 2023.     ADULT ILLNESSES:  Patient Active Problem List   Diagnosis Code    Other constipation K59.09    Rectal bleeding K62.5    Other hemorrhoids K64.8    Epigastric pain R10.13    Gastroesophageal reflux disease with esophagitis K21.00    Nausea R11.0    Gastrointestinal hemorrhage K92.2    Anemia D64.9    Iron deficiency anemia D50.9    Abnormal CT scan abdomen R93.89    Anemia, blood loss D50.0    Blood in the stool K92.1    Lower abdominal pain R10.30    Leukopenia D72.819    Iron deficiency E61.1    Colitis K52.9     SURGERIES:  Past Surgical History:   Procedure Laterality Date    CAPSULE ENDOSCOPY N/A 2021    Procedure: CAPSULE ENDOSCOPY M2A;  Surgeon: Octavia Mattson MD;  Location: [facility] ENDOSCOPY;  Service: Gastroenterology;  Laterality: N/A; Normal exam.  No blood in lumen.  Capsule seen entering the colon.    COLONOSCOPY N/A 2019    Non-bleeding internal hemorrhoids; The examination was otherwise normal; No specimens collected; Repeat colonoscopy at age 48 for screening purposes    COLONOSCOPY N/A 3/18/2021    Dr. Hernandez-The examined portion of the ileum was normal; Erythematous mucosa in the rectum-biopsied; Non-bleeding internal  hemorrhoids    ENDOSCOPY N/A 2020    LA Grade A reflux esophagitis; Normal stomach-biopsied; Normal examined duodenum    ENDOSCOPY N/A 3/18/2021    Dr. Rader irregular-biopsied; Normal stomach-biopsied; Normal first portion of the duodenum and second portion of the duodenum    WISDOM TOOTH EXTRACTION       HEALTH MAINTENANCE ITEMS:  Health Maintenance Due   Topic Date Due    ANNUAL PHYSICAL  Never done    PAP SMEAR  Never done    COVID-19 Vaccine (4 - Booster for Moderna series) 2022       <no information>  Last Completed Colonoscopy       This patient has no relevant Health Maintenance data.          There is no immunization history for the selected administration types on file for this patient.  Last Completed Mammogram       This patient has no relevant Health Maintenance data.              FAMILY HISTORY:  Family History   Problem Relation Age of Onset    Heart defect Mother     No Known Problems Father     Hypertension Maternal Grandmother     No Known Problems Maternal Grandfather     No Known Problems Paternal Grandmother     Prostate cancer Paternal Grandfather     Pancreatic cancer Paternal Grandfather     Colon cancer Neg Hx     Colon polyps Neg Hx     Esophageal cancer Neg Hx     Liver cancer Neg Hx     Stomach cancer Neg Hx     Rectal cancer Neg Hx     Liver disease Neg Hx      SOCIAL HISTORY:  Social History     Socioeconomic History    Marital status:    Tobacco Use    Smoking status: Former     Packs/day: 0.50     Years: 5.00     Pack years: 2.50     Types: Cigarettes     Quit date:      Years since quittin.4    Smokeless tobacco: Never   Vaping Use    Vaping Use: Never used   Substance and Sexual Activity    Alcohol use: Yes     Alcohol/week: 2.0 standard drinks     Types: 1 Glasses of wine, 1 Cans of beer per week     Comment: once in a blue moon liquor    Drug use: No    Sexual activity: Defer       REVIEW OF SYSTEMS:  Review of Systems   Constitutional:   "Negative for fatigue and fever.   HENT:  Negative for trouble swallowing.    Respiratory:  Negative for cough and shortness of breath.    Cardiovascular:  Negative for chest pain, palpitations and leg swelling.   Gastrointestinal:  Positive for abdominal pain, blood in stool and nausea. Negative for vomiting.   Genitourinary:  Negative for hematuria.   Musculoskeletal:  Negative for arthralgias and myalgias.   Skin:  Negative for rash, skin lesions and wound (left food. On antibiotics.  Seeing wound care).   Neurological:  Negative for dizziness, syncope, memory problem and confusion.   Psychiatric/Behavioral:  Negative for suicidal ideas and depressed mood. The patient is not nervous/anxious.      /72   Pulse 67   Temp 97.4 °F (36.3 °C)   Resp 16   Ht 167.6 cm (66\")   Wt 88.6 kg (195 lb 4.8 oz)   SpO2 99%   BMI 31.52 kg/m²  Body surface area is 1.98 meters squared.    Pain Score    05/25/23 0934   PainSc:   2   PainLoc: Generalized         Physical Exam  Constitutional:       Appearance: Normal appearance.   HENT:      Head: Normocephalic and atraumatic.   Cardiovascular:      Rate and Rhythm: Normal rate and regular rhythm.   Pulmonary:      Effort: Pulmonary effort is normal.      Breath sounds: Normal breath sounds.   Abdominal:      General: Bowel sounds are normal.      Palpations: Abdomen is soft.   Musculoskeletal:      Right lower leg: No edema.      Left lower leg: No edema.   Skin:     General: Skin is warm and dry.   Neurological:      Mental Status: She is alert and oriented to person, place, and time.   Psychiatric:         Attention and Perception: Attention normal.         Mood and Affect: Mood normal.         Judgment: Judgment normal.       Ratna Velez Basia reports a pain score of 0 .  No intervention indicated.       ASSESSMENT / PLAN    1. Iron deficiency    2. Rectal bleeding    3. Elevated bilirubin         ASSESSMENT:      1.  Iron Deficiency   -Venofer infusion  05/06/22, Dec " 2 and 5, 2022.    -Venofer infusions:  March 3, 10, 17 and 24, 2023   -Labs today:  Hgb 12.7 Hct 39, Iron 136, Ferritin 159, Sat 42%, TIBC 320  -Stable for observation       2.   Rectal Bleeding   -Was following with GI at . Pt requested referral for local GI.   -She has been seen by GI in [place].  She is scheduled for upper and lower scope June 19.   -States last episode of rectal bleeding was approx 1 week ago    3.  Elevated Bilirubin   -Acute elevation in bilirubin today at 1.5  -Will recheck next week.   -Deferring long term management to PCP.  Labs were faxed to Dr. Chapa    PLAN:  -Stable for observation   -Recheck chemistry next week  Return to office in 3 months.  Patient will have preoffice labs for  CBC, CMP, Anemia Profile and Ferritin   Continue current medications, treatment plans and follow up with PCP and any other providers.  Care discussed with patient.  Understanding expressed.  Patient agreeable with plan.        Karena oTng, APRN  05/25/2023

## 2023-05-25 ENCOUNTER — LAB (OUTPATIENT)
Dept: LAB | Facility: HOSPITAL | Age: 38
End: 2023-05-25
Payer: COMMERCIAL

## 2023-05-25 ENCOUNTER — OFFICE VISIT (OUTPATIENT)
Dept: ONCOLOGY | Facility: CLINIC | Age: 38
End: 2023-05-25
Payer: COMMERCIAL

## 2023-05-25 VITALS
TEMPERATURE: 97.4 F | SYSTOLIC BLOOD PRESSURE: 122 MMHG | BODY MASS INDEX: 31.39 KG/M2 | HEART RATE: 67 BPM | DIASTOLIC BLOOD PRESSURE: 72 MMHG | OXYGEN SATURATION: 99 % | RESPIRATION RATE: 16 BRPM | WEIGHT: 195.3 LBS | HEIGHT: 66 IN

## 2023-05-25 DIAGNOSIS — E61.1 IRON DEFICIENCY: Primary | ICD-10-CM

## 2023-05-25 DIAGNOSIS — K62.5 RECTAL BLEEDING: ICD-10-CM

## 2023-05-25 DIAGNOSIS — R17 ELEVATED BILIRUBIN: ICD-10-CM

## 2023-05-25 LAB
ALBUMIN SERPL-MCNC: 4.6 G/DL (ref 3.5–5.2)
ALBUMIN/GLOB SERPL: 1.8 G/DL
ALP SERPL-CCNC: 44 U/L (ref 39–117)
ALT SERPL W P-5'-P-CCNC: 8 U/L (ref 1–33)
ANION GAP SERPL CALCULATED.3IONS-SCNC: 9 MMOL/L (ref 5–15)
AST SERPL-CCNC: 10 U/L (ref 1–32)
BASOPHILS # BLD AUTO: 0.03 10*3/MM3 (ref 0–0.2)
BASOPHILS NFR BLD AUTO: 0.8 % (ref 0–1.5)
BILIRUB SERPL-MCNC: 1.5 MG/DL (ref 0–1.2)
BUN SERPL-MCNC: 11 MG/DL (ref 6–20)
BUN/CREAT SERPL: 20.8 (ref 7–25)
CALCIUM SPEC-SCNC: 8.6 MG/DL (ref 8.6–10.5)
CHLORIDE SERPL-SCNC: 104 MMOL/L (ref 98–107)
CO2 SERPL-SCNC: 24 MMOL/L (ref 22–29)
CREAT SERPL-MCNC: 0.53 MG/DL (ref 0.57–1)
DEPRECATED RDW RBC AUTO: 39.1 FL (ref 37–54)
EGFRCR SERPLBLD CKD-EPI 2021: 120.8 ML/MIN/1.73
EOSINOPHIL # BLD AUTO: 0.02 10*3/MM3 (ref 0–0.4)
EOSINOPHIL NFR BLD AUTO: 0.5 % (ref 0.3–6.2)
ERYTHROCYTE [DISTWIDTH] IN BLOOD BY AUTOMATED COUNT: 11.8 % (ref 12.3–15.4)
FERRITIN SERPL-MCNC: 159.5 NG/ML (ref 13–150)
GLOBULIN UR ELPH-MCNC: 2.5 GM/DL
GLUCOSE SERPL-MCNC: 98 MG/DL (ref 65–99)
HCT VFR BLD AUTO: 39 % (ref 34–46.6)
HGB BLD-MCNC: 12.7 G/DL (ref 12–15.9)
HOLD SPECIMEN: NORMAL
IMM GRANULOCYTES # BLD AUTO: 0 10*3/MM3 (ref 0–0.05)
IMM GRANULOCYTES NFR BLD AUTO: 0 % (ref 0–0.5)
IRON 24H UR-MRATE: 136 MCG/DL (ref 37–145)
IRON SATN MFR SERPL: 42 % (ref 20–50)
LYMPHOCYTES # BLD AUTO: 1.21 10*3/MM3 (ref 0.7–3.1)
LYMPHOCYTES NFR BLD AUTO: 31.2 % (ref 19.6–45.3)
MCH RBC QN AUTO: 29.4 PG (ref 26.6–33)
MCHC RBC AUTO-ENTMCNC: 32.6 G/DL (ref 31.5–35.7)
MCV RBC AUTO: 90.3 FL (ref 79–97)
MONOCYTES # BLD AUTO: 0.36 10*3/MM3 (ref 0.1–0.9)
MONOCYTES NFR BLD AUTO: 9.3 % (ref 5–12)
NEUTROPHILS NFR BLD AUTO: 2.26 10*3/MM3 (ref 1.7–7)
NEUTROPHILS NFR BLD AUTO: 58.2 % (ref 42.7–76)
NRBC BLD AUTO-RTO: 0 /100 WBC (ref 0–0.2)
PLATELET # BLD AUTO: 207 10*3/MM3 (ref 140–450)
PMV BLD AUTO: 9.6 FL (ref 6–12)
POTASSIUM SERPL-SCNC: 4.1 MMOL/L (ref 3.5–5.2)
PROT SERPL-MCNC: 7.1 G/DL (ref 6–8.5)
RBC # BLD AUTO: 4.32 10*6/MM3 (ref 3.77–5.28)
SODIUM SERPL-SCNC: 137 MMOL/L (ref 136–145)
TIBC SERPL-MCNC: 320 MCG/DL (ref 298–536)
TRANSFERRIN SERPL-MCNC: 215 MG/DL (ref 200–360)
WBC NRBC COR # BLD: 3.88 10*3/MM3 (ref 3.4–10.8)

## 2023-05-25 PROCEDURE — 83540 ASSAY OF IRON: CPT

## 2023-05-25 PROCEDURE — 36415 COLL VENOUS BLD VENIPUNCTURE: CPT

## 2023-05-25 PROCEDURE — 85025 COMPLETE CBC W/AUTO DIFF WBC: CPT

## 2023-05-25 PROCEDURE — 80053 COMPREHEN METABOLIC PANEL: CPT

## 2023-05-25 PROCEDURE — 82728 ASSAY OF FERRITIN: CPT

## 2023-05-25 PROCEDURE — 84466 ASSAY OF TRANSFERRIN: CPT

## 2023-05-26 ENCOUNTER — OFFICE VISIT (OUTPATIENT)
Dept: WOUND CARE | Facility: HOSPITAL | Age: 38
End: 2023-05-26

## 2023-05-26 PROCEDURE — G0463 HOSPITAL OUTPT CLINIC VISIT: HCPCS

## 2023-06-01 ENCOUNTER — LAB (OUTPATIENT)
Dept: LAB | Facility: HOSPITAL | Age: 38
End: 2023-06-01
Payer: COMMERCIAL

## 2023-06-01 DIAGNOSIS — R17 ELEVATED BILIRUBIN: ICD-10-CM

## 2023-06-01 LAB
ALBUMIN SERPL-MCNC: 4.3 G/DL (ref 3.5–5.2)
ALBUMIN/GLOB SERPL: 1.7 G/DL
ALP SERPL-CCNC: 44 U/L (ref 39–117)
ALT SERPL W P-5'-P-CCNC: 10 U/L (ref 1–33)
ANION GAP SERPL CALCULATED.3IONS-SCNC: 8 MMOL/L (ref 5–15)
AST SERPL-CCNC: 13 U/L (ref 1–32)
BILIRUB SERPL-MCNC: 1.3 MG/DL (ref 0–1.2)
BUN SERPL-MCNC: 10 MG/DL (ref 6–20)
BUN/CREAT SERPL: 17.2 (ref 7–25)
CALCIUM SPEC-SCNC: 9 MG/DL (ref 8.6–10.5)
CHLORIDE SERPL-SCNC: 106 MMOL/L (ref 98–107)
CO2 SERPL-SCNC: 25 MMOL/L (ref 22–29)
CREAT SERPL-MCNC: 0.58 MG/DL (ref 0.57–1)
EGFRCR SERPLBLD CKD-EPI 2021: 118.2 ML/MIN/1.73
GLOBULIN UR ELPH-MCNC: 2.6 GM/DL
GLUCOSE SERPL-MCNC: 104 MG/DL (ref 65–99)
POTASSIUM SERPL-SCNC: 4.1 MMOL/L (ref 3.5–5.2)
PROT SERPL-MCNC: 6.9 G/DL (ref 6–8.5)
SODIUM SERPL-SCNC: 139 MMOL/L (ref 136–145)

## 2023-06-01 PROCEDURE — 80053 COMPREHEN METABOLIC PANEL: CPT

## 2023-06-01 PROCEDURE — 36415 COLL VENOUS BLD VENIPUNCTURE: CPT

## 2023-06-05 ENCOUNTER — OFFICE VISIT (OUTPATIENT)
Dept: WOUND CARE | Facility: HOSPITAL | Age: 38
End: 2023-06-05
Payer: COMMERCIAL

## 2023-06-16 ENCOUNTER — APPOINTMENT (OUTPATIENT)
Dept: CT IMAGING | Facility: HOSPITAL | Age: 38
End: 2023-06-16
Payer: COMMERCIAL

## 2023-06-16 ENCOUNTER — HOSPITAL ENCOUNTER (EMERGENCY)
Facility: HOSPITAL | Age: 38
Discharge: HOME | End: 2023-06-17
Attending: STUDENT IN AN ORGANIZED HEALTH CARE EDUCATION/TRAINING PROGRAM
Payer: COMMERCIAL

## 2023-06-16 ENCOUNTER — OFFICE VISIT (OUTPATIENT)
Dept: WOUND CARE | Facility: HOSPITAL | Age: 38
End: 2023-06-16
Payer: COMMERCIAL

## 2023-06-16 DIAGNOSIS — R10.2 PELVIC PAIN: ICD-10-CM

## 2023-06-16 DIAGNOSIS — R10.9 FLANK PAIN: ICD-10-CM

## 2023-06-16 DIAGNOSIS — K59.00 CONSTIPATION, UNSPECIFIED CONSTIPATION TYPE: Primary | ICD-10-CM

## 2023-06-16 LAB
ALBUMIN SERPL-MCNC: 4.6 G/DL (ref 3.5–5.2)
ALBUMIN/GLOB SERPL: 1.8 G/DL
ALP SERPL-CCNC: 53 U/L (ref 39–117)
ALT SERPL W P-5'-P-CCNC: 9 U/L (ref 1–33)
ANION GAP SERPL CALCULATED.3IONS-SCNC: 8 MMOL/L (ref 5–15)
AST SERPL-CCNC: 11 U/L (ref 1–32)
B-HCG UR QL: NEGATIVE
BASOPHILS # BLD AUTO: 0.02 10*3/MM3 (ref 0–0.2)
BASOPHILS NFR BLD AUTO: 0.3 % (ref 0–1.5)
BILIRUB SERPL-MCNC: 0.6 MG/DL (ref 0–1.2)
BILIRUB UR QL STRIP: NEGATIVE
BUN SERPL-MCNC: 15 MG/DL (ref 6–20)
BUN/CREAT SERPL: 22.7 (ref 7–25)
CALCIUM SPEC-SCNC: 9.6 MG/DL (ref 8.6–10.5)
CHLORIDE SERPL-SCNC: 105 MMOL/L (ref 98–107)
CLARITY UR: ABNORMAL
CLUE CELLS SPEC QL WET PREP: ABNORMAL
CO2 SERPL-SCNC: 27 MMOL/L (ref 22–29)
COLOR UR: ABNORMAL
CREAT SERPL-MCNC: 0.66 MG/DL (ref 0.57–1)
D-LACTATE SERPL-SCNC: 0.8 MMOL/L (ref 0.5–2)
DEPRECATED RDW RBC AUTO: 38.6 FL (ref 37–54)
EGFRCR SERPLBLD CKD-EPI 2021: 114.6 ML/MIN/1.73
EOSINOPHIL # BLD AUTO: 0.04 10*3/MM3 (ref 0–0.4)
EOSINOPHIL NFR BLD AUTO: 0.6 % (ref 0.3–6.2)
ERYTHROCYTE [DISTWIDTH] IN BLOOD BY AUTOMATED COUNT: 11.9 % (ref 12.3–15.4)
GLOBULIN UR ELPH-MCNC: 2.6 GM/DL
GLUCOSE SERPL-MCNC: 134 MG/DL (ref 65–99)
GLUCOSE UR STRIP-MCNC: NEGATIVE MG/DL
HCT VFR BLD AUTO: 39.6 % (ref 34–46.6)
HGB BLD-MCNC: 13 G/DL (ref 12–15.9)
HGB UR QL STRIP.AUTO: NEGATIVE
HYDATID CYST SPEC WET PREP: ABNORMAL
IMM GRANULOCYTES # BLD AUTO: 0.01 10*3/MM3 (ref 0–0.05)
IMM GRANULOCYTES NFR BLD AUTO: 0.2 % (ref 0–0.5)
KETONES UR QL STRIP: ABNORMAL
LEUKOCYTE ESTERASE UR QL STRIP.AUTO: NEGATIVE
LIPASE SERPL-CCNC: 48 U/L (ref 13–60)
LYMPHOCYTES # BLD AUTO: 1.46 10*3/MM3 (ref 0.7–3.1)
LYMPHOCYTES NFR BLD AUTO: 22.3 % (ref 19.6–45.3)
MCH RBC QN AUTO: 29.3 PG (ref 26.6–33)
MCHC RBC AUTO-ENTMCNC: 32.8 G/DL (ref 31.5–35.7)
MCV RBC AUTO: 89.2 FL (ref 79–97)
MONOCYTES # BLD AUTO: 0.64 10*3/MM3 (ref 0.1–0.9)
MONOCYTES NFR BLD AUTO: 9.8 % (ref 5–12)
NEUTROPHILS NFR BLD AUTO: 4.39 10*3/MM3 (ref 1.7–7)
NEUTROPHILS NFR BLD AUTO: 66.8 % (ref 42.7–76)
NITRITE UR QL STRIP: NEGATIVE
NRBC BLD AUTO-RTO: 0 /100 WBC (ref 0–0.2)
PH UR STRIP.AUTO: 5.5 [PH] (ref 5–8)
PLATELET # BLD AUTO: 210 10*3/MM3 (ref 140–450)
PMV BLD AUTO: 9.9 FL (ref 6–12)
POTASSIUM SERPL-SCNC: 3.8 MMOL/L (ref 3.5–5.2)
PROT SERPL-MCNC: 7.2 G/DL (ref 6–8.5)
PROT UR QL STRIP: ABNORMAL
RBC # BLD AUTO: 4.44 10*6/MM3 (ref 3.77–5.28)
SODIUM SERPL-SCNC: 140 MMOL/L (ref 136–145)
SP GR UR STRIP: >1.03 (ref 1–1.03)
T VAGINALIS SPEC QL WET PREP: ABNORMAL
UROBILINOGEN UR QL STRIP: ABNORMAL
WBC NRBC COR # BLD: 6.56 10*3/MM3 (ref 3.4–10.8)
WBC SPEC QL WET PREP: ABNORMAL
YEAST GENITAL QL WET PREP: ABNORMAL

## 2023-06-16 PROCEDURE — 81025 URINE PREGNANCY TEST: CPT | Performed by: STUDENT IN AN ORGANIZED HEALTH CARE EDUCATION/TRAINING PROGRAM

## 2023-06-16 PROCEDURE — 80053 COMPREHEN METABOLIC PANEL: CPT

## 2023-06-16 PROCEDURE — 87210 SMEAR WET MOUNT SALINE/INK: CPT | Performed by: STUDENT IN AN ORGANIZED HEALTH CARE EDUCATION/TRAINING PROGRAM

## 2023-06-16 PROCEDURE — 81003 URINALYSIS AUTO W/O SCOPE: CPT | Performed by: STUDENT IN AN ORGANIZED HEALTH CARE EDUCATION/TRAINING PROGRAM

## 2023-06-16 PROCEDURE — 25010000002 ONDANSETRON PER 1 MG

## 2023-06-16 PROCEDURE — G0463 HOSPITAL OUTPT CLINIC VISIT: HCPCS

## 2023-06-16 PROCEDURE — 83690 ASSAY OF LIPASE: CPT

## 2023-06-16 PROCEDURE — 25510000001 IOPAMIDOL 61 % SOLUTION

## 2023-06-16 PROCEDURE — 85025 COMPLETE CBC W/AUTO DIFF WBC: CPT

## 2023-06-16 PROCEDURE — 83605 ASSAY OF LACTIC ACID: CPT

## 2023-06-16 PROCEDURE — 74177 CT ABD & PELVIS W/CONTRAST: CPT

## 2023-06-16 RX ORDER — ONDANSETRON 2 MG/ML
4 INJECTION INTRAMUSCULAR; INTRAVENOUS ONCE
Status: COMPLETED | OUTPATIENT
Start: 2023-06-16 | End: 2023-06-16

## 2023-06-16 RX ORDER — SODIUM CHLORIDE 0.9 % (FLUSH) 0.9 %
10 SYRINGE (ML) INJECTION AS NEEDED
Status: DISCONTINUED | OUTPATIENT
Start: 2023-06-16 | End: 2023-06-17 | Stop reason: HOSPADM

## 2023-06-16 RX ADMIN — SODIUM CHLORIDE, POTASSIUM CHLORIDE, SODIUM LACTATE AND CALCIUM CHLORIDE 500 ML: 600; 310; 30; 20 INJECTION, SOLUTION INTRAVENOUS at 22:28

## 2023-06-16 RX ADMIN — IOPAMIDOL 100 ML: 612 INJECTION, SOLUTION INTRAVENOUS at 23:49

## 2023-06-16 RX ADMIN — ONDANSETRON 4 MG: 2 INJECTION INTRAMUSCULAR; INTRAVENOUS at 22:38

## 2023-06-16 NOTE — Clinical Note
[facility] EMERGENCY DEPARTMENT  [facility] [facility] [address]  Phone: [phone]    Ratna Flor was seen and treated in our emergency department on 6/16/2023.  She may return to work on 06/19/2023.         Thank you for choosing [facility].    Vinod Garcia MD

## 2023-06-17 VITALS
RESPIRATION RATE: 18 BRPM | OXYGEN SATURATION: 98 % | HEIGHT: 66 IN | SYSTOLIC BLOOD PRESSURE: 111 MMHG | HEART RATE: 62 BPM | WEIGHT: 194 LBS | TEMPERATURE: 98.2 F | DIASTOLIC BLOOD PRESSURE: 74 MMHG | BODY MASS INDEX: 31.18 KG/M2

## 2023-06-17 PROCEDURE — 25010000002 KETOROLAC TROMETHAMINE PER 15 MG: Performed by: STUDENT IN AN ORGANIZED HEALTH CARE EDUCATION/TRAINING PROGRAM

## 2023-06-17 PROCEDURE — 25010000002 DROPERIDOL PER 5 MG: Performed by: STUDENT IN AN ORGANIZED HEALTH CARE EDUCATION/TRAINING PROGRAM

## 2023-06-17 RX ORDER — KETOROLAC TROMETHAMINE 15 MG/ML
15 INJECTION, SOLUTION INTRAMUSCULAR; INTRAVENOUS ONCE
Status: COMPLETED | OUTPATIENT
Start: 2023-06-17 | End: 2023-06-17

## 2023-06-17 RX ORDER — DROPERIDOL 2.5 MG/ML
2.5 INJECTION, SOLUTION INTRAMUSCULAR; INTRAVENOUS ONCE
Status: COMPLETED | OUTPATIENT
Start: 2023-06-17 | End: 2023-06-17

## 2023-06-17 RX ORDER — POLYETHYLENE GLYCOL 3350 17 G/17G
17 POWDER, FOR SOLUTION ORAL DAILY
Qty: 14 EACH | Refills: 0 | Status: SHIPPED | OUTPATIENT
Start: 2023-06-17 | End: 2023-07-01

## 2023-06-17 RX ADMIN — DROPERIDOL 2.5 MG: 2.5 INJECTION, SOLUTION INTRAMUSCULAR; INTRAVENOUS at 01:31

## 2023-06-17 RX ADMIN — KETOROLAC TROMETHAMINE 15 MG: 15 INJECTION, SOLUTION INTRAMUSCULAR; INTRAVENOUS at 03:25

## 2023-06-17 NOTE — ED PROVIDER NOTES
"Subjective   History of Present Illness  Patient is a 37-year-old female who presents emergency department with complaints of left lower back pain that radiates into her left flank and pelvis area.  She also states that she is having pain radiating down the front of her left thigh.  She states that the pain came on this afternoon around 5 PM.  Also states that she has been nauseous and she vomited once today.  States that she felt cold and clammy earlier and took her temperature and it was 98.6.  She also states that she had a heating pad on her left flank area and she felt a \"bubble\" feeling.  She denies any blood in her stool or urine.  Having some dysuria, no frequency or odor.  Denies any vaginal discharge.  She states that her last menstrual cycle ended on May 31.  She is unsure if she could be pregnant or not.    Review of Systems   Constitutional:  Positive for chills.   HENT: Negative.     Eyes: Negative.    Respiratory: Negative.     Cardiovascular: Negative.    Gastrointestinal:  Positive for nausea and vomiting.   Endocrine: Negative.    Genitourinary:  Positive for dysuria and flank pain.   Musculoskeletal:  Positive for back pain.   Skin: Negative.    Allergic/Immunologic: Negative.    Neurological: Negative.    Hematological: Negative.    Psychiatric/Behavioral: Negative.       Past Medical History:   Diagnosis Date    GERD (gastroesophageal reflux disease)     Hemorrhoids     IBS (irritable bowel syndrome)        No Known Allergies    Past Surgical History:   Procedure Laterality Date    CAPSULE ENDOSCOPY N/A 4/16/2021    Procedure: CAPSULE ENDOSCOPY M2A;  Surgeon: Octavia Mattson MD;  Location: [facility] ENDOSCOPY;  Service: Gastroenterology;  Laterality: N/A; Normal exam.  No blood in lumen.  Capsule seen entering the colon.    COLONOSCOPY N/A 5/22/2019    Non-bleeding internal hemorrhoids; The examination was otherwise normal; No specimens collected; Repeat colonoscopy at age 48 for screening purposes "    COLONOSCOPY N/A 3/18/2021    Dr. Hernandez-The examined portion of the ileum was normal; Erythematous mucosa in the rectum-biopsied; Non-bleeding internal hemorrhoids    ENDOSCOPY N/A 2020    LA Grade A reflux esophagitis; Normal stomach-biopsied; Normal examined duodenum    ENDOSCOPY N/A 3/18/2021    Dr. Hernandez-Z-line irregular-biopsied; Normal stomach-biopsied; Normal first portion of the duodenum and second portion of the duodenum    WISDOM TOOTH EXTRACTION         Family History   Problem Relation Age of Onset    Heart defect Mother     No Known Problems Father     Hypertension Maternal Grandmother     No Known Problems Maternal Grandfather     No Known Problems Paternal Grandmother     Prostate cancer Paternal Grandfather     Pancreatic cancer Paternal Grandfather     Colon cancer Neg Hx     Colon polyps Neg Hx     Esophageal cancer Neg Hx     Liver cancer Neg Hx     Stomach cancer Neg Hx     Rectal cancer Neg Hx     Liver disease Neg Hx        Social History     Socioeconomic History    Marital status:    Tobacco Use    Smoking status: Former     Packs/day: 0.50     Years: 5.00     Pack years: 2.50     Types: Cigarettes     Quit date:      Years since quittin.4    Smokeless tobacco: Never   Vaping Use    Vaping Use: Never used   Substance and Sexual Activity    Alcohol use: Yes     Alcohol/week: 2.0 standard drinks     Types: 1 Glasses of wine, 1 Cans of beer per week     Comment: once in a blue moon liquor    Drug use: No    Sexual activity: Defer           Objective   Physical Exam  Vitals and nursing note reviewed.   Constitutional:       Appearance: Normal appearance. She is normal weight.   HENT:      Head: Normocephalic.      Nose: Nose normal.   Cardiovascular:      Rate and Rhythm: Normal rate and regular rhythm.      Pulses: Normal pulses.      Heart sounds: Normal heart sounds.   Pulmonary:      Effort: Pulmonary effort is normal.      Breath sounds: Normal breath sounds.  "  Abdominal:      General: Abdomen is flat. Bowel sounds are normal. There is no distension.      Palpations: Abdomen is soft. There is no mass.      Tenderness: There is no abdominal tenderness.   Musculoskeletal:         General: Normal range of motion.      Cervical back: Normal range of motion and neck supple.   Skin:     General: Skin is warm and dry.   Neurological:      General: No focal deficit present.      Mental Status: She is alert and oriented to person, place, and time. Mental status is at baseline.   Psychiatric:         Mood and Affect: Mood normal.         Behavior: Behavior normal.         Thought Content: Thought content normal.         Judgment: Judgment normal.       Procedures           ED Course  ED Course as of 06/17/23 0206   Sat Jun 17, 2023   0206 Case signed out to Dr. Garcia.  CT abdomen pelvis with contrast results are pending. [KR]      ED Course User Index  [KR] Maria Elena Beyer APRN                                           Medical Decision Making  Patient is a 37-year-old female who presents emergency department with complaints of left lower back pain that radiates into her left flank and pelvis area.  She also states that she is having pain radiating down the front of her left thigh.  She states that the pain came on this afternoon around 5 PM.  Also states that she has been nauseous and she vomited once today.  States that she felt cold and clammy earlier and took her temperature and it was 98.6.  She also states that she had a heating pad on her left flank area and she felt a \"bubble\" feeling.  She denies any blood in her stool or urine.  Having some dysuria, no frequency or odor.  Denies any vaginal discharge.  She states that her last menstrual cycle ended on May 31.  She is unsure if she could be pregnant or not.    Patient was non-toxic on arrival. Vital signs stable.  Afebrile.    Patient's presentation raises suspicion for differentials including, but not limited to, urinary " tract infection, pyelonephritis, kidney stone.    Given this, Ratna was placed on the monitor. Laboratory studies and imaging studies were ordered including CBC, CMP, lipase, lactic acid, urinalysis, CT abdomen pelvis with contrast, wet prep, urine pregnancy.    Ratna was given IV fluids, IV Zofran, IV droperidol for symptomatic relief.    CT abdomen pelvis with contrast is still pending at this time.    Labs were reviewed.  CBC and CMP unremarkable.  Lipase normal.  Lactic acid normal.  Negative urine pregnancy.  Wet prep unremarkable.  Urinalysis negative for blood, leukocytes, nitrates.     Case has been signed out to Dr. Garcia who will take over further care.    Signed by:   MARIA INES Abad 6/17/2023 02:02 CDT     Dragon disclaimer:  Part of this note may be an electronic transcription/translation of spoken language to printed text using the Dragon Dictation System.    Amount and/or Complexity of Data Reviewed  Labs: ordered.  Radiology: ordered.    Risk  Prescription drug management.        Final diagnoses:   Flank pain       ED Disposition  ED Disposition       ED Disposition   Discharge    Condition   Stable    Comment   --               No follow-up provider specified.       Medication List      No changes were made to your prescriptions during this visit.            Maria Elena Beyer, MARIA INES  06/17/23 0206

## 2023-06-17 NOTE — DISCHARGE INSTRUCTIONS
Today you are seen for your symptoms all of your work-up is reassuring.  Your CT showed significant evidence of constipation.  Please take MiraLAX daily to help with your symptoms.  Please eat a high-fiber diet.  I want you to follow close with the primary care provider for further management please call schedule close appointment.  If your symptoms worsen prior please return to the emergency department immediately.

## 2023-07-06 NOTE — OP NOTE
Patient: Elvin Sullivan : 1985  Med Rec#: [id number] Acc#: [id number]   Primary Care Provider Robin Olivier DO    Date of Procedure:  10/7/2021    Endoscopist: Sadaf Sanders MD, MD    Referring Provider: Robin Olivier DO,     Operation Performed: Colonoscopy up to the terminal ileum with cold biopsies of the rectum    Indications: For both EGD and colonoscopy exams today:    1. Iron deficiency anemia secondary to inadequate dietary iron intake          2. Abdominal cramping          3. BRBPR (bright red blood per rectum)     4. Nausea and vomiting, intractability of vomiting not specified, unspecified vomiting type     5. MRI pelvis 2021 in care everywhere:  Impression     1. Diffuse thickening of the rectum and anus extending about 11 cm in   length. This is favored to represent a diffuse inflammatory process. Consideration could be given to inflammatory bowel disease such as   surveyed of colitis, infection or potentially inflammation due to   foreign body insertion. Anesthesia:  Sedation was administered by anesthesia who monitored the patient during the procedure. I met with Elvin Sullivan prior to procedure. We discussed the procedure itself, and I have discussed the risks of endoscopy (including-- but not limited to-- pain, discomfort, bleeding potentially requiring second endoscopic procedure and/or blood transfusion, organ perforation requiring operative repair, damage to organs near the colon, infection, aspiration, cardiopulmonary/allergic reaction), benefits, indications to endoscopy. Additionally, we discussed options other than colonoscopy. The patient expressed understanding. All questions answered. The patient decided to proceed with the procedure. Signed informed consent was placed on the chart.     Blood Loss: minimal    Withdrawal time: More than 6 min  Bowel Prep: adequate     Complications: no immediate complications    DESCRIPTION OF PROCEDURE: A time out was performed. After written informed consent was obtained, the patient was placed in the left lateral position. The perianal area was inspected, and a digital rectal exam was performed. A rectal exam was performed: normal tone, no palpable lesions. At this point, a forward viewing Olympus colonoscope was inserted into the anus and carefully advanced to the terminal ileum. The cecum was identified by the ileocecal valve and the appendiceal orifice. The colonoscope was then slowly withdrawn with careful inspection of the mucosa in a linear and circumferential fashion. The scope was retroflexed in the rectum. Suction was utilized during the procedure to remove as much air as possible from the bowel. The colonoscope was removed from the patient, and the procedure was terminated. Findings are listed below. Findings: The mucosa appeared normal throughout the entire examined colon and the terminal ileum. NO large polyps or masses or strictures or colitis or proctitis or ileitis or overt IBD. No lesions to explain the patient's abnormal findings on MRI or her iron deficiency anemia were discovered. Random biopsies were taken from the rectum to check for any microscopic evidence of recent proctitis or IBD. Internal hemorrhoids-Grade 1 without any active bleeding or evidence of recent bleeding but likely source of her bright red blood per rectum  Where it was clearly visible, the mucosa appeared normal throughout the entire examined colon  Retroflexion in the rectum was otherwise normal and revealed no further abnormalities     Recommendations:  1. Repeat colonoscopy: pending pathology -at age 37 for colorectal cancer screening  2. Await biopsy results-you will receive a letter with your results within 7-10 days    3. Consider repeat MRI of the pelvis given the previous abnormal MRI findings.   If a repeat MRI reveals persistent rectal wall abnormalities, patient may require additional evaluation including rectal EUS. 4.  Small bowel PillCam study to complete her GI work-up of chronic iron deficiency anemia.    - Resume previous meds and diet  - GI clinic f/u 6 weeks with Ms. Pike  - Keep scheduled f/u appts with other MDs     - NO ASA/NSAIDs x 2 weeks    Findings and recommendations were discussed w/ the patient. A copy of the images was provided.     Ester Wagner MD, MD  10/7/2021  8:17 AM no

## 2023-08-10 ENCOUNTER — TELEPHONE (OUTPATIENT)
Dept: ONCOLOGY | Facility: CLINIC | Age: 38
End: 2023-08-10

## 2023-08-10 NOTE — TELEPHONE ENCOUNTER
Caller: Ratna Flor    Relationship: Self    Best call back number: [phone]      What was the call regarding: PATIENT WANTED TO HAVE HER LABS DONE SHE HASN'T BEEN FEELING WELL AND WANTED TO HAVE HER LEVELS CHECKED, PLEASE CALL PATIENT TO ADVISE

## 2023-08-11 ENCOUNTER — LAB (OUTPATIENT)
Dept: LAB | Facility: HOSPITAL | Age: 38
End: 2023-08-11
Payer: COMMERCIAL

## 2023-08-11 DIAGNOSIS — D64.9 ANEMIA, UNSPECIFIED TYPE: ICD-10-CM

## 2023-08-11 DIAGNOSIS — D64.9 ANEMIA, UNSPECIFIED TYPE: Primary | ICD-10-CM

## 2023-08-11 DIAGNOSIS — E61.1 IRON DEFICIENCY: ICD-10-CM

## 2023-08-11 LAB
ALBUMIN SERPL-MCNC: 4.5 G/DL (ref 3.5–5.2)
ALBUMIN/GLOB SERPL: 1.7 G/DL
ALP SERPL-CCNC: 38 U/L (ref 39–117)
ALT SERPL W P-5'-P-CCNC: 11 U/L (ref 1–33)
ANION GAP SERPL CALCULATED.3IONS-SCNC: 13 MMOL/L (ref 5–15)
AST SERPL-CCNC: 12 U/L (ref 1–32)
BASOPHILS # BLD AUTO: 0.02 10*3/MM3 (ref 0–0.2)
BASOPHILS NFR BLD AUTO: 0.4 % (ref 0–1.5)
BILIRUB SERPL-MCNC: 1.1 MG/DL (ref 0–1.2)
BUN SERPL-MCNC: 10 MG/DL (ref 6–20)
BUN/CREAT SERPL: 14.9 (ref 7–25)
CALCIUM SPEC-SCNC: 9.2 MG/DL (ref 8.6–10.5)
CHLORIDE SERPL-SCNC: 104 MMOL/L (ref 98–107)
CO2 SERPL-SCNC: 23 MMOL/L (ref 22–29)
CREAT SERPL-MCNC: 0.67 MG/DL (ref 0.57–1)
DEPRECATED RDW RBC AUTO: 37.7 FL (ref 37–54)
EGFRCR SERPLBLD CKD-EPI 2021: 114.2 ML/MIN/1.73
EOSINOPHIL # BLD AUTO: 0.02 10*3/MM3 (ref 0–0.4)
EOSINOPHIL NFR BLD AUTO: 0.4 % (ref 0.3–6.2)
ERYTHROCYTE [DISTWIDTH] IN BLOOD BY AUTOMATED COUNT: 11.9 % (ref 12.3–15.4)
FERRITIN SERPL-MCNC: 181.9 NG/ML (ref 13–150)
GLOBULIN UR ELPH-MCNC: 2.7 GM/DL
GLUCOSE SERPL-MCNC: 98 MG/DL (ref 65–99)
HCT VFR BLD AUTO: 34.9 % (ref 34–46.6)
HGB BLD-MCNC: 11.7 G/DL (ref 12–15.9)
IMM GRANULOCYTES # BLD AUTO: 0.01 10*3/MM3 (ref 0–0.05)
IMM GRANULOCYTES NFR BLD AUTO: 0.2 % (ref 0–0.5)
IRON 24H UR-MRATE: 123 MCG/DL (ref 37–145)
IRON SATN MFR SERPL: 36 % (ref 20–50)
LYMPHOCYTES # BLD AUTO: 1.37 10*3/MM3 (ref 0.7–3.1)
LYMPHOCYTES NFR BLD AUTO: 28.6 % (ref 19.6–45.3)
MCH RBC QN AUTO: 29 PG (ref 26.6–33)
MCHC RBC AUTO-ENTMCNC: 33.5 G/DL (ref 31.5–35.7)
MCV RBC AUTO: 86.4 FL (ref 79–97)
MONOCYTES # BLD AUTO: 0.34 10*3/MM3 (ref 0.1–0.9)
MONOCYTES NFR BLD AUTO: 7.1 % (ref 5–12)
NEUTROPHILS NFR BLD AUTO: 3.03 10*3/MM3 (ref 1.7–7)
NEUTROPHILS NFR BLD AUTO: 63.3 % (ref 42.7–76)
NRBC BLD AUTO-RTO: 0 /100 WBC (ref 0–0.2)
PLATELET # BLD AUTO: 257 10*3/MM3 (ref 140–450)
PMV BLD AUTO: 10 FL (ref 6–12)
POTASSIUM SERPL-SCNC: 3.9 MMOL/L (ref 3.5–5.2)
PROT SERPL-MCNC: 7.2 G/DL (ref 6–8.5)
RBC # BLD AUTO: 4.04 10*6/MM3 (ref 3.77–5.28)
SODIUM SERPL-SCNC: 140 MMOL/L (ref 136–145)
TIBC SERPL-MCNC: 344 MCG/DL (ref 298–536)
TRANSFERRIN SERPL-MCNC: 231 MG/DL (ref 200–360)
WBC NRBC COR # BLD: 4.79 10*3/MM3 (ref 3.4–10.8)

## 2023-08-11 PROCEDURE — 84466 ASSAY OF TRANSFERRIN: CPT

## 2023-08-11 PROCEDURE — 85025 COMPLETE CBC W/AUTO DIFF WBC: CPT

## 2023-08-11 PROCEDURE — 36415 COLL VENOUS BLD VENIPUNCTURE: CPT

## 2023-08-11 PROCEDURE — 83540 ASSAY OF IRON: CPT

## 2023-08-11 PROCEDURE — 80053 COMPREHEN METABOLIC PANEL: CPT

## 2023-08-11 PROCEDURE — 82728 ASSAY OF FERRITIN: CPT

## 2023-08-11 NOTE — PROGRESS NOTES
Her blood count is slightly low but her iron is good.  Has she noticed any bleeding in stool or urine?  What complaints does she have?  She may need to see PCP

## 2023-08-14 ENCOUNTER — TELEPHONE (OUTPATIENT)
Dept: ONCOLOGY | Facility: CLINIC | Age: 38
End: 2023-08-14
Payer: COMMERCIAL

## 2023-08-14 LAB — REF LAB TEST METHOD: NORMAL

## 2023-08-14 NOTE — TELEPHONE ENCOUNTER
----- Message from Jennifer Ca CMA sent at 8/11/2023  2:36 PM CDT -----  Can you please check on this for me Monday.  I tried 3x today and could not reach her, no answer no v/m      ----- Message -----  From: Karena Tong APRN  Sent: 8/11/2023   1:38 PM CDT  To: Jennifer Ca CMA    Her blood count is slightly low but her iron is good.  Has she noticed any bleeding in stool or urine?  What complaints does she have?  She may need to see PCP

## 2023-08-18 ENCOUNTER — TELEPHONE (OUTPATIENT)
Dept: ONCOLOGY | Facility: CLINIC | Age: 38
End: 2023-08-18
Payer: COMMERCIAL

## 2023-08-18 NOTE — TELEPHONE ENCOUNTER
----- Message from Jordana Rivera MA sent at 8/15/2023 10:04 AM CDT -----  Unable to reach patient. I tried yesterday and again today. I sent a message to Pauline to send her a letter asking her to update her contact information.   ----- Message -----  From: Jennifer Ca CMA  Sent: 8/11/2023   2:36 PM CDT  To: Jordana Rivera MA    Can you please check on this for me Monday.  I tried 3x today and could not reach her, no answer no v/m      ----- Message -----  From: Karena Tong APRN  Sent: 8/11/2023   1:38 PM CDT  To: Jennifer Ca CMA    Her blood count is slightly low but her iron is good.  Has she noticed any bleeding in stool or urine?  What complaints does she have?  She may need to see PCP

## 2023-08-18 NOTE — TELEPHONE ENCOUNTER
Still unable to reach pt. Portal messages have gone unread as well. Letter has been mailed to contact office

## 2023-08-24 ENCOUNTER — LAB (OUTPATIENT)
Dept: LAB | Facility: HOSPITAL | Age: 38
End: 2023-08-24
Payer: COMMERCIAL

## 2023-08-24 ENCOUNTER — OFFICE VISIT (OUTPATIENT)
Dept: ONCOLOGY | Facility: CLINIC | Age: 38
End: 2023-08-24
Payer: COMMERCIAL

## 2023-08-24 VITALS
DIASTOLIC BLOOD PRESSURE: 82 MMHG | OXYGEN SATURATION: 98 % | HEART RATE: 78 BPM | BODY MASS INDEX: 30.22 KG/M2 | RESPIRATION RATE: 16 BRPM | WEIGHT: 188 LBS | TEMPERATURE: 97.4 F | HEIGHT: 66 IN | SYSTOLIC BLOOD PRESSURE: 118 MMHG

## 2023-08-24 DIAGNOSIS — K62.5 RECTAL BLEEDING: ICD-10-CM

## 2023-08-24 DIAGNOSIS — D50.9 IRON DEFICIENCY ANEMIA, UNSPECIFIED IRON DEFICIENCY ANEMIA TYPE: Primary | ICD-10-CM

## 2023-08-24 DIAGNOSIS — E61.1 IRON DEFICIENCY: Primary | ICD-10-CM

## 2023-08-24 LAB
ALBUMIN SERPL-MCNC: 4.4 G/DL (ref 3.5–5.2)
ALBUMIN/GLOB SERPL: 1.9 G/DL
ALP SERPL-CCNC: 35 U/L (ref 39–117)
ALT SERPL W P-5'-P-CCNC: 7 U/L (ref 1–33)
ANION GAP SERPL CALCULATED.3IONS-SCNC: 11 MMOL/L (ref 5–15)
AST SERPL-CCNC: 10 U/L (ref 1–32)
BASOPHILS # BLD AUTO: 0.02 10*3/MM3 (ref 0–0.2)
BASOPHILS NFR BLD AUTO: 0.5 % (ref 0–1.5)
BILIRUB SERPL-MCNC: 0.6 MG/DL (ref 0–1.2)
BUN SERPL-MCNC: 10 MG/DL (ref 6–20)
BUN/CREAT SERPL: 19.2 (ref 7–25)
CALCIUM SPEC-SCNC: 9.6 MG/DL (ref 8.6–10.5)
CHLORIDE SERPL-SCNC: 104 MMOL/L (ref 98–107)
CO2 SERPL-SCNC: 23 MMOL/L (ref 22–29)
CREAT SERPL-MCNC: 0.52 MG/DL (ref 0.57–1)
DEPRECATED RDW RBC AUTO: 40.7 FL (ref 37–54)
EGFRCR SERPLBLD CKD-EPI 2021: 121.4 ML/MIN/1.73
EOSINOPHIL # BLD AUTO: 0.03 10*3/MM3 (ref 0–0.4)
EOSINOPHIL NFR BLD AUTO: 0.7 % (ref 0.3–6.2)
ERYTHROCYTE [DISTWIDTH] IN BLOOD BY AUTOMATED COUNT: 12.4 % (ref 12.3–15.4)
FERRITIN SERPL-MCNC: 94.88 NG/ML (ref 13–150)
GLOBULIN UR ELPH-MCNC: 2.3 GM/DL
GLUCOSE SERPL-MCNC: 109 MG/DL (ref 65–99)
HCT VFR BLD AUTO: 32.3 % (ref 34–46.6)
HGB BLD-MCNC: 10.5 G/DL (ref 12–15.9)
HOLD SPECIMEN: NORMAL
IMM GRANULOCYTES # BLD AUTO: 0.01 10*3/MM3 (ref 0–0.05)
IMM GRANULOCYTES NFR BLD AUTO: 0.2 % (ref 0–0.5)
IRON 24H UR-MRATE: 32 MCG/DL (ref 37–145)
IRON SATN MFR SERPL: 10 % (ref 20–50)
LYMPHOCYTES # BLD AUTO: 1.41 10*3/MM3 (ref 0.7–3.1)
LYMPHOCYTES NFR BLD AUTO: 32.9 % (ref 19.6–45.3)
MCH RBC QN AUTO: 28.9 PG (ref 26.6–33)
MCHC RBC AUTO-ENTMCNC: 32.5 G/DL (ref 31.5–35.7)
MCV RBC AUTO: 89 FL (ref 79–97)
MONOCYTES # BLD AUTO: 0.35 10*3/MM3 (ref 0.1–0.9)
MONOCYTES NFR BLD AUTO: 8.2 % (ref 5–12)
NEUTROPHILS NFR BLD AUTO: 2.46 10*3/MM3 (ref 1.7–7)
NEUTROPHILS NFR BLD AUTO: 57.5 % (ref 42.7–76)
NRBC BLD AUTO-RTO: 0 /100 WBC (ref 0–0.2)
PLATELET # BLD AUTO: 213 10*3/MM3 (ref 140–450)
PMV BLD AUTO: 10.3 FL (ref 6–12)
POTASSIUM SERPL-SCNC: 3.8 MMOL/L (ref 3.5–5.2)
PROT SERPL-MCNC: 6.7 G/DL (ref 6–8.5)
RBC # BLD AUTO: 3.63 10*6/MM3 (ref 3.77–5.28)
SODIUM SERPL-SCNC: 138 MMOL/L (ref 136–145)
TIBC SERPL-MCNC: 329 MCG/DL (ref 298–536)
TRANSFERRIN SERPL-MCNC: 221 MG/DL (ref 200–360)
WBC NRBC COR # BLD: 4.28 10*3/MM3 (ref 3.4–10.8)

## 2023-08-24 PROCEDURE — 84466 ASSAY OF TRANSFERRIN: CPT

## 2023-08-24 PROCEDURE — 36415 COLL VENOUS BLD VENIPUNCTURE: CPT

## 2023-08-24 PROCEDURE — 80053 COMPREHEN METABOLIC PANEL: CPT

## 2023-08-24 PROCEDURE — 83540 ASSAY OF IRON: CPT

## 2023-08-24 PROCEDURE — 85025 COMPLETE CBC W/AUTO DIFF WBC: CPT

## 2023-08-24 PROCEDURE — 82728 ASSAY OF FERRITIN: CPT

## 2023-08-24 NOTE — PROGRESS NOTES
[facility]AT[facility]Y  [address]  [facility] [address] [phone]    Patient Name: Ratna Flor  Encounter Date: 08/24/2023   YOB: 1985  Patient Number: [id number]    Hematology / Oncology Progress Note    HPI / REASON FOR VISIT: Ratna Flor is a 37 y.o. female who is followed by this office for iron deficiency anemia.    She has been unable to tolerate oral iron and received IV iron with Venofer      She sees GI at [place].  Her last visit there was 07/17/22.  She had Flexible Sigmoidoscopy on 07/19/22 and per note,that path was negative and no signs of IBD. MD  recommended that she get a MRE and a capsule endoscopy.  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    -Venofer infusion  05/06/22, Dec 2 and 5, 2022.    -Venofer infusions:  March 3, 10, 17 and 24, 2023     INTERVAL HISTORY     She presents to clinic today for continued follow up.  She states she had BRBPR a few weeks ago and then her menstrual cycle.  Since then she has had increased fatigue.       She had labs drawn today and results were reviewed in office.       LABS    Lab Results - Last 18 Months   Lab Units 08/24/23  1422 08/11/23  1108 06/16/23  2235 05/25/23  0928 04/06/23  1402 02/23/23  1343   HEMOGLOBIN g/dL 10.5* 11.7* 13.0 12.7 12.5 12.7   HEMATOCRIT % 32.3* 34.9 39.6 39.0 38.0 38.1   MCV fL 89.0 86.4 89.2 90.3 90.0 86.8   WBC 10*3/mm3 4.28 4.79 6.56 3.88 5.19 5.17   RDW % 12.4 11.9* 11.9* 11.8* 12.1* 12.8   MPV fL 10.3 10.0 9.9 9.6 9.8 10.0   PLATELETS 10*3/mm3 213 257 210 207 243 251   IMM GRAN % % 0.2 0.2 0.2 0.0 0.2 0.2   NEUTROS ABS 10*3/mm3 2.46 3.03 4.39 2.26 3.17 3.30   LYMPHS ABS 10*3/mm3 1.41 1.37 1.46 1.21 1.51 1.49   MONOS ABS 10*3/mm3 0.35 0.34 0.64 0.36 0.44 0.33   EOS ABS 10*3/mm3 0.03  0.02 0.04 0.02 0.03 0.02   BASOS ABS 10*3/mm3 0.02 0.02 0.02 0.03 0.03 0.02   IMMATURE GRANS (ABS) 10*3/mm3 0.01 0.01 0.01 0.00 0.01 0.01   NRBC /100 WBC 0.0 0.0 0.0 0.0 0.0 0.0       Lab Results - Last 18 Months   Lab Units 08/24/23  1422 08/11/23  1108 06/16/23  2235 06/01/23  0840 05/25/23  0928 04/06/23  1402   GLUCOSE mg/dL 109* 98 134* 104* 98 86   SODIUM mmol/L 138 140 140 139 137 141   POTASSIUM mmol/L 3.8 3.9 3.8 4.1 4.1 4.3   CO2 mmol/L 23.0 23.0 27.0 25.0 24.0 25.0   CHLORIDE mmol/L 104 104 105 106 104 105   ANION GAP mmol/L 11.0 13.0 8.0 8.0 9.0 11.0   CREATININE mg/dL 0.52* 0.67 0.66 0.58 0.53* 0.64   BUN mg/dL 10 10 15 10 11 13   BUN / CREAT RATIO  19.2 14.9 22.7 17.2 20.8 20.3   CALCIUM mg/dL 9.6 9.2 9.6 9.0 8.6 9.0   ALK PHOS U/L 35* 38* 53 44 44 52   TOTAL PROTEIN g/dL 6.7 7.2 7.2 6.9 7.1 7.3   ALT (SGPT) U/L 7 11 9 10 8 6   AST (SGOT) U/L 10 12 11 13 10 13   BILIRUBIN mg/dL 0.6 1.1 0.6 1.3* 1.5* 1.1   ALBUMIN g/dL 4.4 4.5 4.6 4.3 4.6 4.7   GLOBULIN gm/dL 2.3 2.7 2.6 2.6 2.5 2.6       Lab Results - Last 18 Months   Lab Units 08/11/23  1108   REFERENCE LAB REPORT  SEE ATTACHED REPORT         Lab Results - Last 18 Months   Lab Units 08/24/23  1422 08/11/23  1108 05/25/23  0928 04/06/23  1402 02/23/23  1343 01/16/23  1206 08/02/22  0905 07/19/22  1221   IRON mcg/dL 32* 123 136 69 36* 46   < > 71   TIBC mcg/dL 329 344 320 304 353 346   < > 298   IRON SATURATION %  --   --   --   --   --   --   --  24   IRON SATURATION (TSAT) % 10* 36 42 23 10* 13*   < >  --    FERRITIN ng/mL 94.88 181.90* 159.50* 262.40* 56.99 82.17   < > 54   TSH mcunit/mL  --   --   --   --   --   --   --  1.182    < > = values in this interval not displayed.         PAST MEDICAL HISTORY:  ALLERGIES:  No Known Allergies  CURRENT MEDICATIONS:  Outpatient Encounter Medications as of 8/24/2023   Medication Sig Dispense Refill    ferrous sulfate (FerrouSul) 325 (65 FE) MG tablet Take 1 tablet by mouth Daily With Breakfast. (Patient taking  differently: Take 1 tablet by mouth Every 72 (Seventy-Two) Hours. Pt takes twice weekly)      ondansetron ODT (ZOFRAN-ODT) 8 MG disintegrating tablet Place 1 tablet on the tongue Every 8 (Eight) Hours As Needed for Nausea or Vomiting. 15 tablet 0    pantoprazole (PROTONIX) 40 MG EC tablet TAKE 1 TABLET DAILY UPON AWAKENING, THEN EAT/PROTEIN 30 MINUTES LATER.      [DISCONTINUED] brompheniramine-pseudoephedrine-DM 30-2-10 MG/5ML syrup Take 5 mL by mouth 4 (Four) Times a Day As Needed for Congestion. (Patient not taking: Reported on 8/24/2023) 118 mL 0    [DISCONTINUED] triamcinolone (KENALOG) 0.1 % ointment Apply 1 application topically to the appropriate area as directed 2 (Two) Times a Day. (Patient not taking: Reported on 8/24/2023) 22 g 0     No facility-administered encounter medications on file as of 8/24/2023.     ADULT ILLNESSES:  Patient Active Problem List   Diagnosis Code    Other constipation K59.09    Rectal bleeding K62.5    Other hemorrhoids K64.8    Epigastric pain R10.13    Gastroesophageal reflux disease with esophagitis K21.00    Nausea R11.0    Gastrointestinal hemorrhage K92.2    Anemia D64.9    Iron deficiency anemia D50.9    Abnormal CT scan abdomen R93.89    Anemia, blood loss D50.0    Blood in the stool K92.1    Lower abdominal pain R10.30    Leukopenia D72.819    Iron deficiency E61.1    Colitis K52.9     SURGERIES:  Past Surgical History:   Procedure Laterality Date    CAPSULE ENDOSCOPY N/A 4/16/2021    Procedure: CAPSULE ENDOSCOPY M2A;  Surgeon: Octavia Mattson MD;  Location: [facility] ENDOSCOPY;  Service: Gastroenterology;  Laterality: N/A; Normal exam.  No blood in lumen.  Capsule seen entering the colon.    COLONOSCOPY N/A 5/22/2019    Non-bleeding internal hemorrhoids; The examination was otherwise normal; No specimens collected; Repeat colonoscopy at age 48 for screening purposes    COLONOSCOPY N/A 3/18/2021    Dr. Hernandez-The examined portion of the ileum was normal; Erythematous mucosa  in the rectum-biopsied; Non-bleeding internal hemorrhoids    ENDOSCOPY N/A 2020    LA Grade A reflux esophagitis; Normal stomach-biopsied; Normal examined duodenum    ENDOSCOPY N/A 3/18/2021    Dr. Rader irregular-biopsied; Normal stomach-biopsied; Normal first portion of the duodenum and second portion of the duodenum    WISDOM TOOTH EXTRACTION       HEALTH MAINTENANCE ITEMS:  Health Maintenance Due   Topic Date Due    ANNUAL PHYSICAL  Never done    PAP SMEAR  Never done    COVID-19 Vaccine (4 - Moderna series) 2022       <no information>  Last Completed Colonoscopy       This patient has no relevant Health Maintenance data.          Immunization History   Administered Date(s) Administered    COVID-19 (MODERNA) 1st,2nd,3rd Dose Monovalent 2021, 2021    COVID-19 (MODERNA) Monovalent Original Booster 2022     Last Completed Mammogram       This patient has no relevant Health Maintenance data.              FAMILY HISTORY:  Family History   Problem Relation Age of Onset    Heart defect Mother     No Known Problems Father     Hypertension Maternal Grandmother     No Known Problems Maternal Grandfather     No Known Problems Paternal Grandmother     Prostate cancer Paternal Grandfather     Pancreatic cancer Paternal Grandfather     Colon cancer Neg Hx     Colon polyps Neg Hx     Esophageal cancer Neg Hx     Liver cancer Neg Hx     Stomach cancer Neg Hx     Rectal cancer Neg Hx     Liver disease Neg Hx      SOCIAL HISTORY:  Social History     Socioeconomic History    Marital status:    Tobacco Use    Smoking status: Former     Packs/day: 0.50     Years: 5.00     Pack years: 2.50     Types: Cigarettes     Quit date:      Years since quittin.6    Smokeless tobacco: Never   Vaping Use    Vaping Use: Never used   Substance and Sexual Activity    Alcohol use: Yes     Alcohol/week: 2.0 standard drinks     Types: 1 Glasses of wine, 1 Cans of beer per week     Comment: once in  "a blue moon liquor    Drug use: No    Sexual activity: Defer       REVIEW OF SYSTEMS:  Review of Systems   Constitutional:  Negative for fatigue and fever.   HENT:  Negative for trouble swallowing.    Respiratory:  Negative for cough and shortness of breath.    Cardiovascular:  Negative for chest pain, palpitations and leg swelling.   Gastrointestinal:  Positive for abdominal pain, blood in stool and nausea. Negative for vomiting.   Genitourinary:  Negative for hematuria.   Musculoskeletal:  Negative for arthralgias and myalgias.   Skin:  Negative for rash and skin lesions.   Neurological:  Negative for dizziness, syncope, memory problem and confusion.   Psychiatric/Behavioral:  Negative for suicidal ideas and depressed mood. The patient is not nervous/anxious.      /82   Pulse 78   Temp 97.4 øF (36.3 øC)   Resp 16   Ht 167.6 cm (66\")   Wt 85.3 kg (188 lb)   SpO2 98%   BMI 30.34 kg/mý  Body surface area is 1.95 meters squared.    Pain Score    08/24/23 1430   PainSc: 0-No pain           Physical Exam  Constitutional:       Appearance: Normal appearance.   HENT:      Head: Normocephalic and atraumatic.   Cardiovascular:      Rate and Rhythm: Normal rate and regular rhythm.   Pulmonary:      Effort: Pulmonary effort is normal.      Breath sounds: Normal breath sounds.   Abdominal:      General: Bowel sounds are normal.      Palpations: Abdomen is soft.   Musculoskeletal:      Right lower leg: No edema.      Left lower leg: No edema.   Skin:     General: Skin is warm and dry.   Neurological:      Mental Status: She is alert and oriented to person, place, and time.   Psychiatric:         Attention and Perception: Attention normal.         Mood and Affect: Mood normal.         Judgment: Judgment normal.       Ratna Flor reports a pain score of 0.  Given her pain assessment as noted, treatment options were discussed and the following options were decided upon as a follow-up plan to address the " patient's pain:  No intervention indicated .      ASSESSMENT / PLAN    1. Iron deficiency    2. Rectal bleeding         ASSESSMENT:      1.  Iron Deficiency   -Venofer infusion  05/06/22, Dec 2 and 5, 2022.    -Venofer infusions:  March 3, 10, 17 and 24, 2023   -Labs today:  Hgb 10.5, Hct 32.3, Iron 32, Ferritin 94, Sat 10%, TIBC 329  -Because she back to school will attempt to get approval for Feraheme 510 mg weekly x 2  -If not approved, will proceed with Venofer 200 mg IV weekly x 4.    2.   Rectal Bleeding   -Was following with GI at .  -Seeing local GI  -States last episode of rectal bleeding was a few weeks ago.       PLAN:  -Stable for observation   -Labs only in 6 weeks.    Return to office in 3 months.  Patient will have preoffice labs for  CBC, CMP, Anemia Profile and Ferritin   Continue current medications, treatment plans and follow up with PCP and any other providers.  Care discussed with patient.  Understanding expressed.  Patient agreeable with plan.        Karena Tong, MARIA INES  08/24/2023

## 2023-08-28 ENCOUNTER — TELEPHONE (OUTPATIENT)
Dept: ONCOLOGY | Facility: CLINIC | Age: 38
End: 2023-08-28
Payer: COMMERCIAL

## 2023-08-28 RX ORDER — SODIUM CHLORIDE 9 MG/ML
250 INJECTION, SOLUTION INTRAVENOUS ONCE
OUTPATIENT
Start: 2023-09-08

## 2023-08-28 RX ORDER — DIPHENHYDRAMINE HCL 25 MG
25 CAPSULE ORAL ONCE
Status: CANCELLED | OUTPATIENT
Start: 2023-09-01

## 2023-08-28 RX ORDER — FAMOTIDINE 10 MG/ML
20 INJECTION, SOLUTION INTRAVENOUS AS NEEDED
Status: CANCELLED | OUTPATIENT
Start: 2023-09-01

## 2023-08-28 RX ORDER — ACETAMINOPHEN 325 MG/1
650 TABLET ORAL ONCE
OUTPATIENT
Start: 2023-09-08

## 2023-08-28 RX ORDER — DIPHENHYDRAMINE HYDROCHLORIDE 50 MG/ML
50 INJECTION INTRAMUSCULAR; INTRAVENOUS AS NEEDED
OUTPATIENT
Start: 2023-09-08

## 2023-08-28 RX ORDER — SODIUM CHLORIDE 9 MG/ML
250 INJECTION, SOLUTION INTRAVENOUS ONCE
Status: CANCELLED | OUTPATIENT
Start: 2023-09-01

## 2023-08-28 RX ORDER — FAMOTIDINE 10 MG/ML
20 INJECTION, SOLUTION INTRAVENOUS AS NEEDED
OUTPATIENT
Start: 2023-09-08

## 2023-08-28 RX ORDER — DIPHENHYDRAMINE HCL 25 MG
25 CAPSULE ORAL ONCE
OUTPATIENT
Start: 2023-09-08

## 2023-08-28 RX ORDER — DIPHENHYDRAMINE HYDROCHLORIDE 50 MG/ML
50 INJECTION INTRAMUSCULAR; INTRAVENOUS AS NEEDED
Status: CANCELLED | OUTPATIENT
Start: 2023-09-01

## 2023-08-28 RX ORDER — ACETAMINOPHEN 325 MG/1
650 TABLET ORAL ONCE
Status: CANCELLED | OUTPATIENT
Start: 2023-09-01

## 2023-08-28 NOTE — TELEPHONE ENCOUNTER
Caller: Ratna Flor    Relationship: Self    Best call back number: [phone]    What is the best time to reach you: ANYTIME    Who are you requesting to speak with (clinical staff, provider, specific staff member): CLINICAL    What was the call regarding: PATIENT CALLING FOR AN UPDATE ON GETTING HER INFUSIONS SCHEDULED. PLEASE CALL TO ADVISE.

## 2023-09-01 ENCOUNTER — INFUSION (OUTPATIENT)
Dept: ONCOLOGY | Facility: HOSPITAL | Age: 38
End: 2023-09-01
Payer: COMMERCIAL

## 2023-09-01 VITALS
TEMPERATURE: 97.7 F | BODY MASS INDEX: 30.22 KG/M2 | HEART RATE: 77 BPM | OXYGEN SATURATION: 99 % | WEIGHT: 188 LBS | RESPIRATION RATE: 20 BRPM | DIASTOLIC BLOOD PRESSURE: 52 MMHG | HEIGHT: 66 IN | SYSTOLIC BLOOD PRESSURE: 104 MMHG

## 2023-09-01 DIAGNOSIS — E61.1 IRON DEFICIENCY: ICD-10-CM

## 2023-09-01 DIAGNOSIS — D50.9 IRON DEFICIENCY ANEMIA, UNSPECIFIED IRON DEFICIENCY ANEMIA TYPE: Primary | ICD-10-CM

## 2023-09-01 PROCEDURE — 96365 THER/PROPH/DIAG IV INF INIT: CPT

## 2023-09-01 PROCEDURE — 25010000002 FERUMOXYTOL 510 MG/17ML SOLUTION 17 ML VIAL: Performed by: NURSE PRACTITIONER

## 2023-09-01 RX ORDER — SODIUM CHLORIDE 9 MG/ML
250 INJECTION, SOLUTION INTRAVENOUS ONCE
Status: COMPLETED | OUTPATIENT
Start: 2023-09-01 | End: 2023-09-01

## 2023-09-01 RX ORDER — FAMOTIDINE 10 MG/ML
20 INJECTION, SOLUTION INTRAVENOUS AS NEEDED
Status: DISCONTINUED | OUTPATIENT
Start: 2023-09-01 | End: 2023-09-01 | Stop reason: HOSPADM

## 2023-09-01 RX ORDER — DIPHENHYDRAMINE HCL 25 MG
25 CAPSULE ORAL ONCE
Status: DISCONTINUED | OUTPATIENT
Start: 2023-09-01 | End: 2023-09-01 | Stop reason: HOSPADM

## 2023-09-01 RX ORDER — ACETAMINOPHEN 325 MG/1
650 TABLET ORAL ONCE
Status: COMPLETED | OUTPATIENT
Start: 2023-09-01 | End: 2023-09-01

## 2023-09-01 RX ORDER — DIPHENHYDRAMINE HYDROCHLORIDE 50 MG/ML
50 INJECTION INTRAMUSCULAR; INTRAVENOUS AS NEEDED
Status: DISCONTINUED | OUTPATIENT
Start: 2023-09-01 | End: 2023-09-01 | Stop reason: HOSPADM

## 2023-09-01 RX ADMIN — ACETAMINOPHEN 650 MG: 325 TABLET, FILM COATED ORAL at 14:29

## 2023-09-01 RX ADMIN — SODIUM CHLORIDE 250 ML: 9 INJECTION, SOLUTION INTRAVENOUS at 14:17

## 2023-09-01 RX ADMIN — FERUMOXYTOL 510 MG: 510 INJECTION INTRAVENOUS at 14:29

## 2023-09-08 ENCOUNTER — INFUSION (OUTPATIENT)
Dept: ONCOLOGY | Facility: HOSPITAL | Age: 38
End: 2023-09-08
Payer: COMMERCIAL

## 2023-09-08 VITALS
HEART RATE: 59 BPM | DIASTOLIC BLOOD PRESSURE: 57 MMHG | HEIGHT: 66 IN | SYSTOLIC BLOOD PRESSURE: 100 MMHG | RESPIRATION RATE: 18 BRPM | WEIGHT: 186.8 LBS | OXYGEN SATURATION: 99 % | TEMPERATURE: 96.9 F | BODY MASS INDEX: 30.02 KG/M2

## 2023-09-08 DIAGNOSIS — E61.1 IRON DEFICIENCY: ICD-10-CM

## 2023-09-08 DIAGNOSIS — D50.9 IRON DEFICIENCY ANEMIA, UNSPECIFIED IRON DEFICIENCY ANEMIA TYPE: Primary | ICD-10-CM

## 2023-09-08 PROCEDURE — 96374 THER/PROPH/DIAG INJ IV PUSH: CPT

## 2023-09-08 PROCEDURE — 25010000002 FERUMOXYTOL 510 MG/17ML SOLUTION 17 ML VIAL: Performed by: NURSE PRACTITIONER

## 2023-09-08 PROCEDURE — 96365 THER/PROPH/DIAG IV INF INIT: CPT

## 2023-09-08 RX ORDER — ACETAMINOPHEN 325 MG/1
650 TABLET ORAL ONCE
Status: COMPLETED | OUTPATIENT
Start: 2023-09-08 | End: 2023-09-08

## 2023-09-08 RX ORDER — DIPHENHYDRAMINE HCL 25 MG
25 CAPSULE ORAL ONCE
Status: DISCONTINUED | OUTPATIENT
Start: 2023-09-08 | End: 2023-09-08 | Stop reason: HOSPADM

## 2023-09-08 RX ORDER — SODIUM CHLORIDE 9 MG/ML
250 INJECTION, SOLUTION INTRAVENOUS ONCE
Status: COMPLETED | OUTPATIENT
Start: 2023-09-08 | End: 2023-09-08

## 2023-09-08 RX ADMIN — FERUMOXYTOL 510 MG: 510 INJECTION INTRAVENOUS at 10:01

## 2023-09-08 RX ADMIN — SODIUM CHLORIDE 250 ML: 9 INJECTION, SOLUTION INTRAVENOUS at 09:49

## 2023-09-08 RX ADMIN — ACETAMINOPHEN 650 MG: 325 TABLET, FILM COATED ORAL at 09:49

## 2023-10-06 ENCOUNTER — LAB (OUTPATIENT)
Dept: LAB | Facility: HOSPITAL | Age: 38
End: 2023-10-06
Payer: COMMERCIAL

## 2023-10-06 DIAGNOSIS — E61.1 IRON DEFICIENCY: ICD-10-CM

## 2023-10-06 LAB
ALBUMIN SERPL-MCNC: 4.1 G/DL (ref 3.5–5.2)
ALBUMIN/GLOB SERPL: 1.6 G/DL
ALP SERPL-CCNC: 34 U/L (ref 39–117)
ALT SERPL W P-5'-P-CCNC: 8 U/L (ref 1–33)
ANION GAP SERPL CALCULATED.3IONS-SCNC: 9 MMOL/L (ref 5–15)
AST SERPL-CCNC: 13 U/L (ref 1–32)
BASOPHILS # BLD AUTO: 0.03 10*3/MM3 (ref 0–0.2)
BASOPHILS NFR BLD AUTO: 0.7 % (ref 0–1.5)
BILIRUB SERPL-MCNC: 0.8 MG/DL (ref 0–1.2)
BUN SERPL-MCNC: 9 MG/DL (ref 6–20)
BUN/CREAT SERPL: 12.7 (ref 7–25)
CALCIUM SPEC-SCNC: 9 MG/DL (ref 8.6–10.5)
CHLORIDE SERPL-SCNC: 107 MMOL/L (ref 98–107)
CO2 SERPL-SCNC: 25 MMOL/L (ref 22–29)
CREAT SERPL-MCNC: 0.71 MG/DL (ref 0.57–1)
DEPRECATED RDW RBC AUTO: 38.9 FL (ref 37–54)
EGFRCR SERPLBLD CKD-EPI 2021: 111.1 ML/MIN/1.73
EOSINOPHIL # BLD AUTO: 0.03 10*3/MM3 (ref 0–0.4)
EOSINOPHIL NFR BLD AUTO: 0.7 % (ref 0.3–6.2)
ERYTHROCYTE [DISTWIDTH] IN BLOOD BY AUTOMATED COUNT: 11.9 % (ref 12.3–15.4)
FERRITIN SERPL-MCNC: 369.8 NG/ML (ref 13–150)
GLOBULIN UR ELPH-MCNC: 2.5 GM/DL
GLUCOSE SERPL-MCNC: 92 MG/DL (ref 65–99)
HCT VFR BLD AUTO: 37.4 % (ref 34–46.6)
HGB BLD-MCNC: 12.3 G/DL (ref 12–15.9)
IMM GRANULOCYTES # BLD AUTO: 0.01 10*3/MM3 (ref 0–0.05)
IMM GRANULOCYTES NFR BLD AUTO: 0.2 % (ref 0–0.5)
IRON 24H UR-MRATE: 90 MCG/DL (ref 37–145)
IRON SATN MFR SERPL: 28 % (ref 20–50)
LYMPHOCYTES # BLD AUTO: 1.09 10*3/MM3 (ref 0.7–3.1)
LYMPHOCYTES NFR BLD AUTO: 23.8 % (ref 19.6–45.3)
MCH RBC QN AUTO: 29.4 PG (ref 26.6–33)
MCHC RBC AUTO-ENTMCNC: 32.9 G/DL (ref 31.5–35.7)
MCV RBC AUTO: 89.5 FL (ref 79–97)
MONOCYTES # BLD AUTO: 0.39 10*3/MM3 (ref 0.1–0.9)
MONOCYTES NFR BLD AUTO: 8.5 % (ref 5–12)
NEUTROPHILS NFR BLD AUTO: 3.03 10*3/MM3 (ref 1.7–7)
NEUTROPHILS NFR BLD AUTO: 66.1 % (ref 42.7–76)
NRBC BLD AUTO-RTO: 0 /100 WBC (ref 0–0.2)
PLATELET # BLD AUTO: 221 10*3/MM3 (ref 140–450)
PMV BLD AUTO: 10 FL (ref 6–12)
POTASSIUM SERPL-SCNC: 4.1 MMOL/L (ref 3.5–5.2)
PROT SERPL-MCNC: 6.6 G/DL (ref 6–8.5)
RBC # BLD AUTO: 4.18 10*6/MM3 (ref 3.77–5.28)
SODIUM SERPL-SCNC: 141 MMOL/L (ref 136–145)
TIBC SERPL-MCNC: 323 MCG/DL (ref 298–536)
TRANSFERRIN SERPL-MCNC: 217 MG/DL (ref 200–360)
WBC NRBC COR # BLD: 4.58 10*3/MM3 (ref 3.4–10.8)

## 2023-10-06 PROCEDURE — 80053 COMPREHEN METABOLIC PANEL: CPT

## 2023-10-06 PROCEDURE — 84466 ASSAY OF TRANSFERRIN: CPT

## 2023-10-06 PROCEDURE — 85025 COMPLETE CBC W/AUTO DIFF WBC: CPT

## 2023-10-06 PROCEDURE — 36415 COLL VENOUS BLD VENIPUNCTURE: CPT

## 2023-10-06 PROCEDURE — 83540 ASSAY OF IRON: CPT

## 2023-10-06 PROCEDURE — 82728 ASSAY OF FERRITIN: CPT

## 2023-11-22 ENCOUNTER — LAB (OUTPATIENT)
Dept: LAB | Facility: HOSPITAL | Age: 38
End: 2023-11-22
Payer: COMMERCIAL

## 2023-11-22 DIAGNOSIS — E61.1 IRON DEFICIENCY: ICD-10-CM

## 2023-11-22 LAB
ALBUMIN SERPL-MCNC: 4.1 G/DL (ref 3.5–5.2)
ALBUMIN/GLOB SERPL: 1.6 G/DL
ALP SERPL-CCNC: 35 U/L (ref 39–117)
ALT SERPL W P-5'-P-CCNC: 8 U/L (ref 1–33)
ANION GAP SERPL CALCULATED.3IONS-SCNC: 8 MMOL/L (ref 5–15)
AST SERPL-CCNC: 11 U/L (ref 1–32)
BASOPHILS # BLD AUTO: 0.02 10*3/MM3 (ref 0–0.2)
BASOPHILS NFR BLD AUTO: 0.4 % (ref 0–1.5)
BILIRUB SERPL-MCNC: 0.8 MG/DL (ref 0–1.2)
BUN SERPL-MCNC: 10 MG/DL (ref 6–20)
BUN/CREAT SERPL: 17.9 (ref 7–25)
CALCIUM SPEC-SCNC: 8.8 MG/DL (ref 8.6–10.5)
CHLORIDE SERPL-SCNC: 106 MMOL/L (ref 98–107)
CO2 SERPL-SCNC: 26 MMOL/L (ref 22–29)
CREAT SERPL-MCNC: 0.56 MG/DL (ref 0.57–1)
DEPRECATED RDW RBC AUTO: 39.2 FL (ref 37–54)
EGFRCR SERPLBLD CKD-EPI 2021: 119.2 ML/MIN/1.73
EOSINOPHIL # BLD AUTO: 0.03 10*3/MM3 (ref 0–0.4)
EOSINOPHIL NFR BLD AUTO: 0.6 % (ref 0.3–6.2)
ERYTHROCYTE [DISTWIDTH] IN BLOOD BY AUTOMATED COUNT: 12.1 % (ref 12.3–15.4)
FERRITIN SERPL-MCNC: 280 NG/ML (ref 13–150)
GLOBULIN UR ELPH-MCNC: 2.5 GM/DL
GLUCOSE SERPL-MCNC: 107 MG/DL (ref 65–99)
HCT VFR BLD AUTO: 36.7 % (ref 34–46.6)
HGB BLD-MCNC: 12.3 G/DL (ref 12–15.9)
IMM GRANULOCYTES # BLD AUTO: 0.01 10*3/MM3 (ref 0–0.05)
IMM GRANULOCYTES NFR BLD AUTO: 0.2 % (ref 0–0.5)
IRON 24H UR-MRATE: 103 MCG/DL (ref 37–145)
IRON SATN MFR SERPL: 31 % (ref 20–50)
LYMPHOCYTES # BLD AUTO: 1.32 10*3/MM3 (ref 0.7–3.1)
LYMPHOCYTES NFR BLD AUTO: 28.1 % (ref 19.6–45.3)
MCH RBC QN AUTO: 29.3 PG (ref 26.6–33)
MCHC RBC AUTO-ENTMCNC: 33.5 G/DL (ref 31.5–35.7)
MCV RBC AUTO: 87.4 FL (ref 79–97)
MONOCYTES # BLD AUTO: 0.4 10*3/MM3 (ref 0.1–0.9)
MONOCYTES NFR BLD AUTO: 8.5 % (ref 5–12)
NEUTROPHILS NFR BLD AUTO: 2.92 10*3/MM3 (ref 1.7–7)
NEUTROPHILS NFR BLD AUTO: 62.2 % (ref 42.7–76)
NRBC BLD AUTO-RTO: 0 /100 WBC (ref 0–0.2)
PLATELET # BLD AUTO: 239 10*3/MM3 (ref 140–450)
PMV BLD AUTO: 10.3 FL (ref 6–12)
POTASSIUM SERPL-SCNC: 4.1 MMOL/L (ref 3.5–5.2)
PROT SERPL-MCNC: 6.6 G/DL (ref 6–8.5)
RBC # BLD AUTO: 4.2 10*6/MM3 (ref 3.77–5.28)
SODIUM SERPL-SCNC: 140 MMOL/L (ref 136–145)
TIBC SERPL-MCNC: 329 MCG/DL (ref 298–536)
TRANSFERRIN SERPL-MCNC: 221 MG/DL (ref 200–360)
WBC NRBC COR # BLD AUTO: 4.7 10*3/MM3 (ref 3.4–10.8)

## 2023-11-22 PROCEDURE — 80053 COMPREHEN METABOLIC PANEL: CPT

## 2023-11-22 PROCEDURE — 36415 COLL VENOUS BLD VENIPUNCTURE: CPT

## 2023-11-22 PROCEDURE — 84466 ASSAY OF TRANSFERRIN: CPT

## 2023-11-22 PROCEDURE — 82728 ASSAY OF FERRITIN: CPT

## 2023-11-22 PROCEDURE — 83540 ASSAY OF IRON: CPT

## 2023-11-22 PROCEDURE — 85025 COMPLETE CBC W/AUTO DIFF WBC: CPT

## 2024-02-15 DIAGNOSIS — D50.9 IRON DEFICIENCY ANEMIA, UNSPECIFIED IRON DEFICIENCY ANEMIA TYPE: Primary | ICD-10-CM

## 2024-02-26 ENCOUNTER — TRANSCRIBE ORDERS (OUTPATIENT)
Dept: ADMINISTRATIVE | Facility: HOSPITAL | Age: 39
End: 2024-02-26
Payer: COMMERCIAL

## 2024-02-26 DIAGNOSIS — Z12.31 SCREENING MAMMOGRAM FOR BREAST CANCER: Primary | ICD-10-CM

## 2024-03-05 ENCOUNTER — OFFICE VISIT (OUTPATIENT)
Dept: ONCOLOGY | Facility: CLINIC | Age: 39
End: 2024-03-05
Payer: COMMERCIAL

## 2024-03-05 ENCOUNTER — LAB (OUTPATIENT)
Dept: LAB | Facility: HOSPITAL | Age: 39
End: 2024-03-05
Payer: COMMERCIAL

## 2024-03-05 VITALS
SYSTOLIC BLOOD PRESSURE: 112 MMHG | WEIGHT: 180.6 LBS | DIASTOLIC BLOOD PRESSURE: 68 MMHG | HEART RATE: 90 BPM | TEMPERATURE: 97.8 F | RESPIRATION RATE: 16 BRPM | BODY MASS INDEX: 29.02 KG/M2 | OXYGEN SATURATION: 98 % | HEIGHT: 66 IN

## 2024-03-05 DIAGNOSIS — E61.1 IRON DEFICIENCY: Primary | ICD-10-CM

## 2024-03-05 DIAGNOSIS — D50.9 IRON DEFICIENCY ANEMIA, UNSPECIFIED IRON DEFICIENCY ANEMIA TYPE: Primary | ICD-10-CM

## 2024-03-05 DIAGNOSIS — K62.5 RECTAL BLEEDING: ICD-10-CM

## 2024-03-05 LAB
ALBUMIN SERPL-MCNC: 4.2 G/DL (ref 3.5–5.2)
ALBUMIN/GLOB SERPL: 1.6 G/DL
ALP SERPL-CCNC: 37 U/L (ref 39–117)
ALT SERPL W P-5'-P-CCNC: 12 U/L (ref 1–33)
ANION GAP SERPL CALCULATED.3IONS-SCNC: 10 MMOL/L (ref 5–15)
AST SERPL-CCNC: 13 U/L (ref 1–32)
BASOPHILS # BLD AUTO: 0.02 10*3/MM3 (ref 0–0.2)
BASOPHILS NFR BLD AUTO: 0.3 % (ref 0–1.5)
BILIRUB SERPL-MCNC: 0.9 MG/DL (ref 0–1.2)
BUN SERPL-MCNC: 14 MG/DL (ref 6–20)
BUN/CREAT SERPL: 24.6 (ref 7–25)
CALCIUM SPEC-SCNC: 8.9 MG/DL (ref 8.6–10.5)
CHLORIDE SERPL-SCNC: 103 MMOL/L (ref 98–107)
CO2 SERPL-SCNC: 26 MMOL/L (ref 22–29)
CREAT SERPL-MCNC: 0.57 MG/DL (ref 0.57–1)
DEPRECATED RDW RBC AUTO: 37.7 FL (ref 37–54)
EGFRCR SERPLBLD CKD-EPI 2021: 118 ML/MIN/1.73
EOSINOPHIL # BLD AUTO: 0.02 10*3/MM3 (ref 0–0.4)
EOSINOPHIL NFR BLD AUTO: 0.3 % (ref 0.3–6.2)
ERYTHROCYTE [DISTWIDTH] IN BLOOD BY AUTOMATED COUNT: 11.7 % (ref 12.3–15.4)
FERRITIN SERPL-MCNC: 295 NG/ML (ref 13–150)
GLOBULIN UR ELPH-MCNC: 2.7 GM/DL
GLUCOSE SERPL-MCNC: 96 MG/DL (ref 65–99)
HCT VFR BLD AUTO: 36.4 % (ref 34–46.6)
HGB BLD-MCNC: 12.2 G/DL (ref 12–15.9)
HOLD SPECIMEN: NORMAL
IMM GRANULOCYTES # BLD AUTO: 0.01 10*3/MM3 (ref 0–0.05)
IMM GRANULOCYTES NFR BLD AUTO: 0.2 % (ref 0–0.5)
IRON 24H UR-MRATE: 48 MCG/DL (ref 37–145)
IRON SATN MFR SERPL: 15 % (ref 20–50)
LYMPHOCYTES # BLD AUTO: 1.29 10*3/MM3 (ref 0.7–3.1)
LYMPHOCYTES NFR BLD AUTO: 22.5 % (ref 19.6–45.3)
MCH RBC QN AUTO: 29.8 PG (ref 26.6–33)
MCHC RBC AUTO-ENTMCNC: 33.5 G/DL (ref 31.5–35.7)
MCV RBC AUTO: 89 FL (ref 79–97)
MONOCYTES # BLD AUTO: 0.42 10*3/MM3 (ref 0.1–0.9)
MONOCYTES NFR BLD AUTO: 7.3 % (ref 5–12)
NEUTROPHILS NFR BLD AUTO: 3.98 10*3/MM3 (ref 1.7–7)
NEUTROPHILS NFR BLD AUTO: 69.4 % (ref 42.7–76)
NRBC BLD AUTO-RTO: 0 /100 WBC (ref 0–0.2)
PLATELET # BLD AUTO: 226 10*3/MM3 (ref 140–450)
PMV BLD AUTO: 9.9 FL (ref 6–12)
POTASSIUM SERPL-SCNC: 3.9 MMOL/L (ref 3.5–5.2)
PROT SERPL-MCNC: 6.9 G/DL (ref 6–8.5)
RBC # BLD AUTO: 4.09 10*6/MM3 (ref 3.77–5.28)
SODIUM SERPL-SCNC: 139 MMOL/L (ref 136–145)
TIBC SERPL-MCNC: 329 MCG/DL (ref 298–536)
TRANSFERRIN SERPL-MCNC: 221 MG/DL (ref 200–360)
WBC NRBC COR # BLD AUTO: 5.74 10*3/MM3 (ref 3.4–10.8)

## 2024-03-05 PROCEDURE — 80053 COMPREHEN METABOLIC PANEL: CPT

## 2024-03-05 PROCEDURE — 36415 COLL VENOUS BLD VENIPUNCTURE: CPT

## 2024-03-05 PROCEDURE — 85025 COMPLETE CBC W/AUTO DIFF WBC: CPT

## 2024-03-05 PROCEDURE — 84466 ASSAY OF TRANSFERRIN: CPT

## 2024-03-05 PROCEDURE — 82728 ASSAY OF FERRITIN: CPT

## 2024-03-05 PROCEDURE — 99213 OFFICE O/P EST LOW 20 MIN: CPT | Performed by: NURSE PRACTITIONER

## 2024-03-05 PROCEDURE — 83540 ASSAY OF IRON: CPT

## 2024-03-05 RX ORDER — DESOGESTREL AND ETHINYL ESTRADIOL 0.15-0.03
1 KIT ORAL DAILY
COMMUNITY
Start: 2024-01-25

## 2024-03-05 NOTE — PROGRESS NOTES
[facility] HEMATOLOGY & ONCOLOGY  [address]  [facility] [address] [phone]    Patient Name: Ratna Flor  Encounter Date: 03/05/2024   YOB: 1985  Patient Number: [id number]    Hematology / Oncology Progress Note    HPI / REASON FOR VISIT: Ratna Flor is a 38 y.o. female who is followed by this office for iron deficiency anemia.    She has been unable to tolerate oral iron and received IV iron with Venofer      She sees GI at [place].  Her last visit there was 07/17/22.  She had Flexible Sigmoidoscopy on 07/19/22 and per note,that path was negative and no signs of IBD. MD  recommended that she get a MRE and a capsule endoscopy.  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    -Venofer infusion  05/06/22, Dec 2 and 5, 2022.    -Venofer infusions:  March 3, 10, 17 and 24, 2023     INTERVAL HISTORY     She presents to clinic today for continued follow up.  She had Feraheme September 1 and 8, 2023 and tolerated infusions well.      Pt states her bleeding has been controlled with only intermittned light bleeding.     She had labs drawn today and results were reviewed in office.       LABS    Lab Results - Last 18 Months   Lab Units 03/05/24  1328 11/22/23  1059 10/06/23  1309 08/24/23  1422 08/11/23  1108 06/16/23  2235   HEMOGLOBIN g/dL 12.2 12.3 12.3 10.5* 11.7* 13.0   HEMATOCRIT % 36.4 36.7 37.4 32.3* 34.9 39.6   MCV fL 89.0 87.4 89.5 89.0 86.4 89.2   WBC 10*3/mm3 5.74 4.70 4.58 4.28 4.79 6.56   RDW % 11.7* 12.1* 11.9* 12.4 11.9* 11.9*   MPV fL 9.9 10.3 10.0 10.3 10.0 9.9   PLATELETS 10*3/mm3 226 239 221 213 257 210   IMM GRAN % % 0.2 0.2 0.2 0.2 0.2 0.2   NEUTROS ABS 10*3/mm3 3.98 2.92 3.03 2.46 3.03 4.39   LYMPHS ABS 10*3/mm3 1.29 1.32 1.09 1.41 1.37 1.46   MONOS ABS 10*3/mm3 0.42 0.40  0.39 0.35 0.34 0.64   EOS ABS 10*3/mm3 0.02 0.03 0.03 0.03 0.02 0.04   BASOS ABS 10*3/mm3 0.02 0.02 0.03 0.02 0.02 0.02   IMMATURE GRANS (ABS) 10*3/mm3 0.01 0.01 0.01 0.01 0.01 0.01   NRBC /100 WBC 0.0 0.0 0.0 0.0 0.0 0.0       Lab Results - Last 18 Months   Lab Units 03/05/24  1328 11/22/23  1059 10/06/23  1309 08/24/23  1422 08/11/23  1108 06/16/23  2235   GLUCOSE mg/dL 96 107* 92 109* 98 134*   SODIUM mmol/L 139 140 141 138 140 140   POTASSIUM mmol/L 3.9 4.1 4.1 3.8 3.9 3.8   CO2 mmol/L 26.0 26.0 25.0 23.0 23.0 27.0   CHLORIDE mmol/L 103 106 107 104 104 105   ANION GAP mmol/L 10.0 8.0 9.0 11.0 13.0 8.0   CREATININE mg/dL 0.57 0.56* 0.71 0.52* 0.67 0.66   BUN mg/dL 14 10 9 10 10 15   BUN / CREAT RATIO  24.6 17.9 12.7 19.2 14.9 22.7   CALCIUM mg/dL 8.9 8.8 9.0 9.6 9.2 9.6   ALK PHOS U/L 37* 35* 34* 35* 38* 53   TOTAL PROTEIN g/dL 6.9 6.6 6.6 6.7 7.2 7.2   ALT (SGPT) U/L 12 8 8 7 11 9   AST (SGOT) U/L 13 11 13 10 12 11   BILIRUBIN mg/dL 0.9 0.8 0.8 0.6 1.1 0.6   ALBUMIN g/dL 4.2 4.1 4.1 4.4 4.5 4.6   GLOBULIN gm/dL 2.7 2.5 2.5 2.3 2.7 2.6       Lab Results - Last 18 Months   Lab Units 08/11/23  1108   REFERENCE LAB REPORT  SEE ATTACHED REPORT         Lab Results - Last 18 Months   Lab Units 03/05/24  1328 11/22/23  1059 10/06/23  1309 08/24/23  1422 08/11/23  1108 05/25/23  0928   IRON mcg/dL 48 103 90 32* 123 136   TIBC mcg/dL 329 329 323 329 344 320   IRON SATURATION (TSAT) % 15* 31 28 10* 36 42   FERRITIN ng/mL 295.00* 280.00* 369.80* 94.88 181.90* 159.50*         PAST MEDICAL HISTORY:  ALLERGIES:  No Known Allergies  CURRENT MEDICATIONS:  Outpatient Encounter Medications as of 3/5/2024   Medication Sig Dispense Refill    Apri 0.15-30 MG-MCG per tablet Take 1 tablet by mouth Daily.      ferrous sulfate (FerrouSul) 325 (65 FE) MG tablet Take 1 tablet by mouth Daily With Breakfast. (Patient taking differently: Take 1 tablet by mouth Every 72 (Seventy-Two) Hours. Pt takes twice weekly)      ondansetron ODT  (ZOFRAN-ODT) 8 MG disintegrating tablet Place 1 tablet on the tongue Every 8 (Eight) Hours As Needed for Nausea or Vomiting. 15 tablet 0    [DISCONTINUED] pantoprazole (PROTONIX) 40 MG EC tablet TAKE 1 TABLET DAILY UPON AWAKENING, THEN EAT/PROTEIN 30 MINUTES LATER. (Patient not taking: Reported on 3/5/2024)       No facility-administered encounter medications on file as of 3/5/2024.     ADULT ILLNESSES:  Patient Active Problem List   Diagnosis Code    Other constipation K59.09    Rectal bleeding K62.5    Other hemorrhoids K64.8    Epigastric pain R10.13    Gastroesophageal reflux disease with esophagitis K21.00    Nausea R11.0    Gastrointestinal hemorrhage K92.2    Anemia D64.9    Iron deficiency anemia D50.9    Abnormal CT scan abdomen R93.89    Anemia, blood loss D50.0    Blood in the stool K92.1    Lower abdominal pain R10.30    Leukopenia D72.819    Iron deficiency E61.1    Colitis K52.9     SURGERIES:  Past Surgical History:   Procedure Laterality Date    CAPSULE ENDOSCOPY N/A 4/16/2021    Procedure: CAPSULE ENDOSCOPY M2A;  Surgeon: Octavia Mattson MD;  Location: [facility] ENDOSCOPY;  Service: Gastroenterology;  Laterality: N/A; Normal exam.  No blood in lumen.  Capsule seen entering the colon.    COLONOSCOPY N/A 5/22/2019    Non-bleeding internal hemorrhoids; The examination was otherwise normal; No specimens collected; Repeat colonoscopy at age 48 for screening purposes    COLONOSCOPY N/A 3/18/2021    Dr. Hernandez-The examined portion of the ileum was normal; Erythematous mucosa in the rectum-biopsied; Non-bleeding internal hemorrhoids    ENDOSCOPY N/A 6/17/2020    LA Grade A reflux esophagitis; Normal stomach-biopsied; Normal examined duodenum    ENDOSCOPY N/A 3/18/2021    Dr. Hernandez-Z-line irregular-biopsied; Normal stomach-biopsied; Normal first portion of the duodenum and second portion of the duodenum    WISDOM TOOTH EXTRACTION       HEALTH MAINTENANCE ITEMS:  Health Maintenance Due   Topic Date Due     ANNUAL PHYSICAL  Never done    PAP SMEAR  Never done    BMI FOLLOWUP  2022    INFLUENZA VACCINE  2023    COVID-19 Vaccine ( season) 2023       <no information>  Last Completed Colonoscopy       This patient has no relevant Health Maintenance data.          Immunization History   Administered Date(s) Administered    COVID-19 (MODERNA) 1st,2nd,3rd Dose Monovalent 2021, 2021    COVID-19 (MODERNA) Monovalent Original Booster 2022     Last Completed Mammogram       This patient has no relevant Health Maintenance data.              FAMILY HISTORY:  Family History   Problem Relation Age of Onset    Heart defect Mother     No Known Problems Father     Hypertension Maternal Grandmother     No Known Problems Maternal Grandfather     No Known Problems Paternal Grandmother     Prostate cancer Paternal Grandfather     Pancreatic cancer Paternal Grandfather     Colon cancer Neg Hx     Colon polyps Neg Hx     Esophageal cancer Neg Hx     Liver cancer Neg Hx     Stomach cancer Neg Hx     Rectal cancer Neg Hx     Liver disease Neg Hx      SOCIAL HISTORY:  Social History     Socioeconomic History    Marital status:    Tobacco Use    Smoking status: Former     Current packs/day: 0.00     Average packs/day: 0.5 packs/day for 5.0 years (2.5 ttl pk-yrs)     Types: Cigarettes     Start date:      Quit date:      Years since quittin.1    Smokeless tobacco: Never   Vaping Use    Vaping status: Never Used   Substance and Sexual Activity    Alcohol use: Yes     Alcohol/week: 2.0 standard drinks of alcohol     Types: 1 Glasses of wine, 1 Cans of beer per week     Comment: once in a blue moon liquor    Drug use: No    Sexual activity: Defer       REVIEW OF SYSTEMS:  Review of Systems   Constitutional:  Negative for fatigue and fever.   HENT:  Negative for trouble swallowing.    Respiratory:  Negative for cough and shortness of breath.    Cardiovascular:  Negative for chest pain,  "palpitations and leg swelling.   Gastrointestinal:  Positive for abdominal pain, blood in stool and nausea. Negative for vomiting.   Genitourinary:  Negative for hematuria.   Musculoskeletal:  Negative for arthralgias and myalgias.   Skin:  Negative for rash and skin lesions.   Neurological:  Negative for dizziness, syncope, memory problem and confusion.   Psychiatric/Behavioral:  Negative for suicidal ideas and depressed mood. The patient is not nervous/anxious.        /68   Pulse 90   Temp 97.8 °F (36.6 °C)   Resp 16   Ht 167.6 cm (66\")   Wt 81.9 kg (180 lb 9.6 oz)   SpO2 98%   BMI 29.15 kg/m²  Body surface area is 1.92 meters squared.    Pain Score    03/05/24 1344   PainSc: 0-No pain     Physical Exam  Constitutional:       Appearance: Normal appearance.   HENT:      Head: Normocephalic and atraumatic.   Cardiovascular:      Rate and Rhythm: Normal rate and regular rhythm.   Pulmonary:      Effort: Pulmonary effort is normal.      Breath sounds: Normal breath sounds.   Abdominal:      General: Bowel sounds are normal.      Palpations: Abdomen is soft.   Musculoskeletal:      Right lower leg: No edema.      Left lower leg: No edema.   Skin:     General: Skin is warm and dry.   Neurological:      Mental Status: She is alert and oriented to person, place, and time.   Psychiatric:         Attention and Perception: Attention normal.         Mood and Affect: Mood normal.         Judgment: Judgment normal.         Ratna Flor reports a pain score of 0.  Given her pain assessment as noted, treatment options were discussed and the following options were decided upon as a follow-up plan to address the patient's pain:  No intervention indicated .      ASSESSMENT / PLAN    1. Iron deficiency    2. Rectal bleeding           ASSESSMENT:      1.  Iron Deficiency   -Venofer infusion  05/06/22, Dec 2 and 5, 2022.    -Venofer infusions:  March 3, 10, 17 and 24, 2023   -Feraheme infusion September 1 and 8, " 2023   -Labs today:  Hgb 12.2, Hct 36.4, Iron 48, Ferritin 295, Sat 15%, TIBC 329   -Stable for observation    2.   Rectal Bleeding   -Was following with GI at .  -Seeing local GI         PLAN:  -Stable for observation   Labs only in 3 months.   RTC in 6 months  Patient will have preoffice labs for  CBC, CMP, Anemia Profile and Ferritin   Continue current medications, treatment plans and follow up with PCP and any other providers.  Care discussed with patient.  Understanding expressed.  Patient agreeable with plan.        Karena Tong, APRN  03/05/2024

## 2024-03-06 LAB
NCCN CRITERIA FLAG: NORMAL
TYRER CUZICK SCORE: 15.2

## 2024-03-08 ENCOUNTER — HOSPITAL ENCOUNTER (OUTPATIENT)
Dept: MAMMOGRAPHY | Facility: HOSPITAL | Age: 39
Discharge: HOME | End: 2024-03-08
Admitting: FAMILY MEDICINE
Payer: COMMERCIAL

## 2024-03-08 DIAGNOSIS — Z12.31 SCREENING MAMMOGRAM FOR BREAST CANCER: ICD-10-CM

## 2024-03-08 PROCEDURE — 77067 SCR MAMMO BI INCL CAD: CPT

## 2024-03-08 PROCEDURE — 77063 BREAST TOMOSYNTHESIS BI: CPT

## 2024-07-08 ENCOUNTER — LAB (OUTPATIENT)
Dept: LAB | Facility: HOSPITAL | Age: 39
End: 2024-07-08
Payer: COMMERCIAL

## 2024-07-08 DIAGNOSIS — E61.1 IRON DEFICIENCY: ICD-10-CM

## 2024-07-08 LAB
ALBUMIN SERPL-MCNC: 4.1 G/DL (ref 3.5–5.2)
ALBUMIN/GLOB SERPL: 1.4 G/DL
ALP SERPL-CCNC: 37 U/L (ref 39–117)
ALT SERPL W P-5'-P-CCNC: 16 U/L (ref 1–33)
ANION GAP SERPL CALCULATED.3IONS-SCNC: 8 MMOL/L (ref 5–15)
AST SERPL-CCNC: 15 U/L (ref 1–32)
BASOPHILS # BLD AUTO: 0.02 10*3/MM3 (ref 0–0.2)
BASOPHILS NFR BLD AUTO: 0.3 % (ref 0–1.5)
BILIRUB SERPL-MCNC: 1.2 MG/DL (ref 0–1.2)
BUN SERPL-MCNC: 11 MG/DL (ref 6–20)
BUN/CREAT SERPL: 16.4 (ref 7–25)
CALCIUM SPEC-SCNC: 9.1 MG/DL (ref 8.6–10.5)
CHLORIDE SERPL-SCNC: 104 MMOL/L (ref 98–107)
CO2 SERPL-SCNC: 27 MMOL/L (ref 22–29)
CREAT SERPL-MCNC: 0.67 MG/DL (ref 0.57–1)
DEPRECATED RDW RBC AUTO: 39.4 FL (ref 37–54)
EGFRCR SERPLBLD CKD-EPI 2021: 113.5 ML/MIN/1.73
EOSINOPHIL # BLD AUTO: 0.04 10*3/MM3 (ref 0–0.4)
EOSINOPHIL NFR BLD AUTO: 0.5 % (ref 0.3–6.2)
ERYTHROCYTE [DISTWIDTH] IN BLOOD BY AUTOMATED COUNT: 12.3 % (ref 12.3–15.4)
FERRITIN SERPL-MCNC: 250.7 NG/ML (ref 13–150)
GLOBULIN UR ELPH-MCNC: 2.9 GM/DL
GLUCOSE SERPL-MCNC: 100 MG/DL (ref 65–99)
HCT VFR BLD AUTO: 35.9 % (ref 34–46.6)
HGB BLD-MCNC: 12.1 G/DL (ref 12–15.9)
IMM GRANULOCYTES # BLD AUTO: 0.01 10*3/MM3 (ref 0–0.05)
IMM GRANULOCYTES NFR BLD AUTO: 0.1 % (ref 0–0.5)
IRON 24H UR-MRATE: 31 MCG/DL (ref 37–145)
IRON SATN MFR SERPL: 9 % (ref 20–50)
LYMPHOCYTES # BLD AUTO: 1.52 10*3/MM3 (ref 0.7–3.1)
LYMPHOCYTES NFR BLD AUTO: 20.9 % (ref 19.6–45.3)
MCH RBC QN AUTO: 29.4 PG (ref 26.6–33)
MCHC RBC AUTO-ENTMCNC: 33.7 G/DL (ref 31.5–35.7)
MCV RBC AUTO: 87.3 FL (ref 79–97)
MONOCYTES # BLD AUTO: 0.61 10*3/MM3 (ref 0.1–0.9)
MONOCYTES NFR BLD AUTO: 8.4 % (ref 5–12)
NEUTROPHILS NFR BLD AUTO: 5.09 10*3/MM3 (ref 1.7–7)
NEUTROPHILS NFR BLD AUTO: 69.8 % (ref 42.7–76)
NRBC BLD AUTO-RTO: 0 /100 WBC (ref 0–0.2)
PLATELET # BLD AUTO: 249 10*3/MM3 (ref 140–450)
PMV BLD AUTO: 9.4 FL (ref 6–12)
POTASSIUM SERPL-SCNC: 3.9 MMOL/L (ref 3.5–5.2)
PROT SERPL-MCNC: 7 G/DL (ref 6–8.5)
RBC # BLD AUTO: 4.11 10*6/MM3 (ref 3.77–5.28)
SODIUM SERPL-SCNC: 139 MMOL/L (ref 136–145)
TIBC SERPL-MCNC: 361 MCG/DL (ref 298–536)
TRANSFERRIN SERPL-MCNC: 242 MG/DL (ref 200–360)
WBC NRBC COR # BLD AUTO: 7.29 10*3/MM3 (ref 3.4–10.8)

## 2024-07-08 PROCEDURE — 83540 ASSAY OF IRON: CPT

## 2024-07-08 PROCEDURE — 84466 ASSAY OF TRANSFERRIN: CPT

## 2024-07-08 PROCEDURE — 36415 COLL VENOUS BLD VENIPUNCTURE: CPT

## 2024-07-08 PROCEDURE — 85025 COMPLETE CBC W/AUTO DIFF WBC: CPT

## 2024-07-08 PROCEDURE — 80053 COMPREHEN METABOLIC PANEL: CPT

## 2024-07-08 PROCEDURE — 82728 ASSAY OF FERRITIN: CPT

## 2024-07-11 DIAGNOSIS — Z30.41 ENCOUNTER FOR SURVEILLANCE OF CONTRACEPTIVE PILLS: Primary | ICD-10-CM

## 2024-07-11 RX ORDER — DESOGESTREL AND ETHINYL ESTRADIOL 0.15-0.03
1 KIT ORAL DAILY
Qty: 28 TABLET | Refills: 0 | Status: SHIPPED | OUTPATIENT
Start: 2024-07-11

## 2024-07-11 NOTE — TELEPHONE ENCOUNTER
Allie patino. Pt scheduled for OV with Tanya on 07/15/24 however she will need to start a new pack of OCP before that time. Pt requesting a 30 day supply be sent to the pharmacy to get her through to appt date. Med pending if appropriate

## 2024-08-04 DIAGNOSIS — Z30.41 ENCOUNTER FOR SURVEILLANCE OF CONTRACEPTIVE PILLS: ICD-10-CM

## 2024-08-05 RX ORDER — DESOGESTREL AND ETHINYL ESTRADIOL 0.15-0.03
1 KIT ORAL DAILY
Qty: 28 TABLET | Refills: 0 | OUTPATIENT
Start: 2024-08-05

## 2024-08-09 ENCOUNTER — OFFICE VISIT (OUTPATIENT)
Dept: OBSTETRICS AND GYNECOLOGY | Age: 39
End: 2024-08-09
Payer: COMMERCIAL

## 2024-08-09 VITALS
SYSTOLIC BLOOD PRESSURE: 122 MMHG | WEIGHT: 183 LBS | DIASTOLIC BLOOD PRESSURE: 82 MMHG | BODY MASS INDEX: 29.41 KG/M2 | HEIGHT: 66 IN

## 2024-08-09 DIAGNOSIS — R19.8 ANAL SYMPTOMS: ICD-10-CM

## 2024-08-09 DIAGNOSIS — Z30.41 ENCOUNTER FOR SURVEILLANCE OF CONTRACEPTIVE PILLS: Primary | ICD-10-CM

## 2024-08-09 RX ORDER — DESOGESTREL AND ETHINYL ESTRADIOL 0.15-0.03
1 KIT ORAL DAILY
Qty: 28 TABLET | Refills: 11 | Status: SHIPPED | OUTPATIENT
Start: 2024-08-09

## 2024-08-09 NOTE — PROGRESS NOTES
Subjective   Ratna Flor is a 38 y.o. female.     History of Present Illness  The patient is new to [facility] and myself. She wishes to establish GYN care. The patient is currently on her cycle. The patient is doing well with current use of ocp and wishes to continue. She is here today for refills of her ocp.  Contraception  This is a chronic problem. The current episode started more than 1 year ago. The problem occurs daily. The problem has been unchanged. Associated symptoms include fatigue. Pertinent negatives include no abdominal pain, anorexia, arthralgias, change in bowel habit, chest pain, chills, congestion, coughing, diaphoresis, fever, headaches, joint swelling, myalgias, nausea, neck pain, numbness, rash, sore throat, swollen glands, urinary symptoms, vertigo, visual change, vomiting or weakness. Nothing aggravates the symptoms. She has tried nothing for the symptoms. The treatment provided no relief.            Review of Systems   Constitutional: Negative.  Positive for fatigue. Negative for chills, diaphoresis and fever.   HENT: Negative.  Negative for congestion, sore throat and swollen glands.    Eyes: Negative.    Respiratory: Negative.  Negative for cough.    Cardiovascular: Negative.  Negative for chest pain.   Gastrointestinal: Negative.  Negative for abdominal pain, anorexia, change in bowel habit, nausea and vomiting.        Patient states she has a constant feeling of a brick in her anus. Patient reports she has to push like she is having a baby for stool to move. She states that bowel movements can range from normal consistency to diarrhea but regardless has to bear down for stool to be removed.   Endocrine: Negative.    Genitourinary: Negative.  Negative for menstrual problem.   Musculoskeletal: Negative.  Negative for arthralgias, joint swelling, myalgias and neck pain.   Skin: Negative.  Negative for rash.   Allergic/Immunologic: Negative.    Neurological: Negative.  Negative for  vertigo, weakness and numbness.   Hematological: Negative.    Psychiatric/Behavioral: Negative.         Objective   Physical Exam  Vitals and nursing note reviewed.   Constitutional:       General: She is not in acute distress.     Appearance: Normal appearance. She is not ill-appearing or diaphoretic.   HENT:      Head: Normocephalic and atraumatic.   Cardiovascular:      Rate and Rhythm: Normal rate and regular rhythm.      Pulses: Normal pulses.      Heart sounds: Normal heart sounds.   Pulmonary:      Effort: Pulmonary effort is normal. No respiratory distress.      Breath sounds: Normal breath sounds.   Abdominal:      General: Bowel sounds are normal.   Musculoskeletal:         General: No deformity.      Cervical back: Normal range of motion.   Skin:     Coloration: Skin is not pale.   Neurological:      General: No focal deficit present.      Mental Status: She is alert.   Psychiatric:         Mood and Affect: Mood normal.         Behavior: Behavior normal.         Thought Content: Thought content normal.         Judgment: Judgment normal.         Assessment & Plan   Diagnoses and all orders for this visit:    1. Encounter for surveillance of contraceptive pills (Primary)  Comments:  The patient is new to the office and myself and is here today to establish care. She is established with her pcp and screening lab work is current. The patient is due for her well woman examination and will schedule this today. She is on her cycle today. Refills of ocp sent to her pharmacy.  Orders:  -     Apri 0.15-30 MG-MCG per tablet; Take 1 tablet by mouth Daily.  Dispense: 28 tablet; Refill: 11    2. Anal symptoms  Comments:  The patient reports over the last several years she has had a complex medical journey. She states daily she feels inflammation from her midsection extending down towards the upper thighs. She states that she has to bear down for bowel movements, regardless if it is regular consistency or diarrhea.  "Previous imaging has been completed that has showed \"persistent mild circumferential rectal wall thickening extending from the anus into the low rectum and mid rectum over a  length of 10 cm\" from a study completed in 6/11/2021.  Patient has been seen by GI here and at [place] for work up to include multiple colonoscopy procedures without confirming diagnosis. She has taken a recent break from this as medical bills have been costly. Pelvic examination will be completed as part of her well woman examination scheduled for the near future.        BMI is >= 25 and <30. (Overweight) The following options were offered after discussion;: information on healthy weight added to patient's after visit summary        MARIA INES [place]   "

## 2024-09-03 ENCOUNTER — TELEPHONE (OUTPATIENT)
Dept: ONCOLOGY | Facility: CLINIC | Age: 39
End: 2024-09-03

## 2024-09-03 NOTE — TELEPHONE ENCOUNTER
Caller: Ratna Flor    Relationship to patient: Self    Best call back number: [phone]    Type of visit: LAB, FU1    Requested date: TUES OR THURS AFTER 2:30PM     If rescheduling, when is the original appointment: 9/9

## 2024-09-06 ENCOUNTER — OFFICE VISIT (OUTPATIENT)
Dept: OBSTETRICS AND GYNECOLOGY | Age: 39
End: 2024-09-06
Payer: COMMERCIAL

## 2024-09-06 VITALS
SYSTOLIC BLOOD PRESSURE: 112 MMHG | WEIGHT: 178.8 LBS | HEIGHT: 66 IN | DIASTOLIC BLOOD PRESSURE: 74 MMHG | BODY MASS INDEX: 28.73 KG/M2

## 2024-09-06 DIAGNOSIS — N92.1 IRREGULAR INTERMENSTRUAL BLEEDING: ICD-10-CM

## 2024-09-06 DIAGNOSIS — Z01.419 WELL WOMAN EXAM WITH ROUTINE GYNECOLOGICAL EXAM: Primary | ICD-10-CM

## 2024-09-06 DIAGNOSIS — Z12.4 ENCOUNTER FOR SCREENING FOR CERVICAL CANCER: ICD-10-CM

## 2024-09-06 PROCEDURE — G0123 SCREEN CERV/VAG THIN LAYER: HCPCS

## 2024-09-06 PROCEDURE — 87624 HPV HI-RISK TYP POOLED RSLT: CPT

## 2024-09-06 NOTE — PROGRESS NOTES
"Subjective     Ratna Flor is a 38 y.o. female    History of Present Illness  The patient presents to the office today for her WWE. The patient was recently diagnosed with Covid and is recovering from this. She reports irregular bleeding with use of ocp. She is still experiencing intermittent GI issues as well.   Gynecologic Exam  The patient's primary symptoms include vaginal bleeding (intermittent spotting). The patient's pertinent negatives include no genital itching, genital lesions, genital odor, genital rash, missed menses, pelvic pain or vaginal discharge. This is a new problem. The current episode started more than 1 month ago. The problem occurs intermittently. The problem has been comes and goes. The patient is experiencing no pain. Pertinent negatives include no abdominal pain, anorexia, back pain, chills, constipation, diarrhea, discolored urine, dysuria, fever, flank pain, frequency, headaches, hematuria, joint pain, joint swelling, nausea, painful intercourse, rash, sore throat, urgency or vomiting. The vaginal discharge was bloody. The vaginal bleeding is spotting. She has not been passing clots. She has not been passing tissue. She has tried nothing for the symptoms. She is sexually active. No, her partner does not have an STD. She uses oral contraceptives for contraception. Her menstrual history has been irregular. The maximum temperature recorded prior to her arrival was no fever.         /74   Ht 167.6 cm (66\")   Wt 81.1 kg (178 lb 12.8 oz)   LMP 08/08/2024   BMI 28.86 kg/m²     Outpatient Encounter Medications as of 9/6/2024   Medication Sig Dispense Refill    Apri 0.15-30 MG-MCG per tablet Take 1 tablet by mouth Daily. 28 tablet 11    azithromycin (ZITHROMAX) 250 MG tablet Take 1 tablet by mouth Daily for 6 days. Take 2 tablets the first day, then 1 tablet daily for 4 days. 6 tablet 0    brompheniramine-pseudoephedrine-DM 30-2-10 MG/5ML syrup Take 5 mL by mouth 4 (Four) Times a " Day As Needed for Congestion or Cough. 118 mL 0    fluticasone (FLONASE) 50 MCG/ACT nasal spray 2 sprays into the nostril(s) as directed by provider Daily. 2 puffs each nostril 16 g 0    methylPREDNISolone (MEDROL) 4 MG dose pack Take as directed on package instructions. 21 tablet 0     No facility-administered encounter medications on file as of 9/6/2024.       Past Medical History  Past Medical History:   Diagnosis Date    GERD (gastroesophageal reflux disease)     Hemorrhoids     History of transfusion About 2/3 years ago    IBS (irritable bowel syndrome)     Ovarian cyst Two years ago maybe?    PMS (premenstrual syndrome) Every time I have my montly       Surgical History  Past Surgical History:   Procedure Laterality Date    CAPSULE ENDOSCOPY N/A 4/16/2021    Procedure: CAPSULE ENDOSCOPY M2A;  Surgeon: Octavia Mattson MD;  Location: [facility] ENDOSCOPY;  Service: Gastroenterology;  Laterality: N/A; Normal exam.  No blood in lumen.  Capsule seen entering the colon.    COLONOSCOPY N/A 5/22/2019    Non-bleeding internal hemorrhoids; The examination was otherwise normal; No specimens collected; Repeat colonoscopy at age 48 for screening purposes    COLONOSCOPY N/A 3/18/2021    Dr. Hernandez-The examined portion of the ileum was normal; Erythematous mucosa in the rectum-biopsied; Non-bleeding internal hemorrhoids    ENDOSCOPY N/A 6/17/2020    LA Grade A reflux esophagitis; Normal stomach-biopsied; Normal examined duodenum    ENDOSCOPY N/A 3/18/2021    Dr. Hernandez-Z-line irregular-biopsied; Normal stomach-biopsied; Normal first portion of the duodenum and second portion of the duodenum    WISDOM TOOTH EXTRACTION         Family History  Family History   Problem Relation Age of Onset    Heart defect Mother     No Known Problems Father     Hypertension Maternal Grandmother     No Known Problems Maternal Grandfather     No Known Problems Paternal Grandmother     Prostate cancer Paternal Grandfather     Pancreatic cancer  Paternal Grandfather     Colon cancer Neg Hx     Colon polyps Neg Hx     Esophageal cancer Neg Hx     Liver cancer Neg Hx     Stomach cancer Neg Hx     Rectal cancer Neg Hx     Liver disease Neg Hx        The following portions of the patient's history were reviewed and updated as appropriate: allergies, current medications, past family history, past medical history, past social history, past surgical history, and problem list.    Review of Systems   Constitutional: Negative.  Negative for chills and fever.   HENT: Negative.  Negative for sore throat.    Eyes: Negative.    Respiratory: Negative.     Cardiovascular: Negative.    Gastrointestinal: Negative.  Negative for abdominal pain, anorexia, constipation, diarrhea, nausea and vomiting.   Endocrine: Negative.    Genitourinary: Negative.  Positive for menstrual problem. Negative for dysuria, flank pain, frequency, hematuria, missed menses, pelvic pain, urgency and vaginal discharge.   Musculoskeletal: Negative.  Negative for back pain and joint pain.   Skin: Negative.  Negative for rash.   Allergic/Immunologic: Negative.    Neurological: Negative.    Hematological: Negative.    Psychiatric/Behavioral: Negative.         Objective   Physical Exam  Vitals and nursing note reviewed. Exam conducted with a chaperone present.   Constitutional:       General: She is not in acute distress.     Appearance: She is well-developed. She is not diaphoretic.   HENT:      Head: Normocephalic.      Right Ear: External ear normal.      Left Ear: External ear normal.      Nose: Nose normal.   Eyes:      General: No scleral icterus.        Right eye: No discharge.         Left eye: No discharge.      Conjunctiva/sclera: Conjunctivae normal.      Pupils: Pupils are equal, round, and reactive to light.   Neck:      Thyroid: No thyromegaly.      Vascular: No carotid bruit.      Trachea: No tracheal deviation.   Cardiovascular:      Rate and Rhythm: Normal rate and regular rhythm.       Pulses: Normal pulses.      Heart sounds: Normal heart sounds. No murmur heard.  Pulmonary:      Effort: Pulmonary effort is normal. No respiratory distress.      Breath sounds: Normal breath sounds. No wheezing.   Chest:   Breasts:     Breasts are symmetrical.      Right: Normal. No swelling, bleeding, inverted nipple, mass, nipple discharge, skin change or tenderness.      Left: Normal. No swelling, bleeding, inverted nipple, mass, nipple discharge, skin change or tenderness.   Abdominal:      General: Bowel sounds are normal. There is no distension.      Palpations: Abdomen is soft. There is no mass.      Tenderness: There is no abdominal tenderness. There is no right CVA tenderness, left CVA tenderness or guarding.      Hernia: No hernia is present. There is no hernia in the left inguinal area or right inguinal area.   Genitourinary:     General: Normal vulva.      Exam position: Lithotomy position.      Labia:         Right: No rash, tenderness, lesion or injury.         Left: No rash, tenderness, lesion or injury.       Vagina: Normal. No signs of injury and foreign body. No vaginal discharge, erythema, tenderness or bleeding.      Cervix: Normal.      Uterus: Normal. Not enlarged, not fixed and not tender.       Adnexa: Right adnexa normal and left adnexa normal.        Right: No mass, tenderness or fullness.          Left: No mass, tenderness or fullness.        Rectum: Normal. No mass.      Comments: BSU normal  Urethral meatus  Normal  Perineum  Normal  Musculoskeletal:         General: No tenderness. Normal range of motion.      Cervical back: Normal range of motion and neck supple.   Lymphadenopathy:      Head:      Right side of head: No submental, submandibular, tonsillar, preauricular, posterior auricular or occipital adenopathy.      Left side of head: No submental, submandibular, tonsillar, preauricular, posterior auricular or occipital adenopathy.      Cervical: No cervical adenopathy.      Right  cervical: No superficial, deep or posterior cervical adenopathy.     Left cervical: No superficial, deep or posterior cervical adenopathy.      Upper Body:      Right upper body: No supraclavicular, axillary or pectoral adenopathy.      Left upper body: No supraclavicular, axillary or pectoral adenopathy.      Lower Body: No right inguinal adenopathy. No left inguinal adenopathy.   Skin:     General: Skin is warm and dry.      Findings: No bruising, erythema or rash.   Neurological:      Mental Status: She is alert and oriented to person, place, and time.      Coordination: Coordination normal.   Psychiatric:         Mood and Affect: Mood normal.         Behavior: Behavior normal.         Thought Content: Thought content normal.         Judgment: Judgment normal.       PHQ-9 Depression Screening  Little interest or pleasure in doing things? 0-->not at all   Feeling down, depressed, or hopeless? 0-->not at all   Trouble falling or staying asleep, or sleeping too much?     Feeling tired or having little energy?     Poor appetite or overeating?     Feeling bad about yourself - or that you are a failure or have let yourself or your family down?     Trouble concentrating on things, such as reading the newspaper or watching television?     Moving or speaking so slowly that other people could have noticed? Or the opposite - being so fidgety or restless that you have been moving around a lot more than usual?     Thoughts that you would be better off dead, or of hurting yourself in some way?     PHQ-9 Total Score 0   If you checked off any problems, how difficult have these problems made it for you to do your work, take care of things at home, or get along with other people?          Assessment & Plan   Diagnoses and all orders for this visit:    1. Well woman exam with routine gynecological exam (Primary)  Comments:  The patient presents to the office today for her WWE. The patient is established with her pcp and lab work  is current. The patient is still experiencing intermittent GI issues that she has been evaluated by GI and is still followed by oncology/hematology for. She is currently recovering from recent Covid illness.     2. Encounter for screening for cervical cancer  Comments:  The patient is sexually active and declines std screening today. Pelvic exam normal, pap smear completed.  Orders:  -     Liquid-based Pap Smear, Screening    3. Irregular intermenstrual bleeding  Comments:  The patient reports having irregular cycles with use of ocp. She has recently been diagnosed with Covid but reports this occurring before then. She states PMS symptoms have increased, particularly GI symptoms. She states a previous OB/GYN provider had mentioned her possibly having endometriosis but this diagnosis was not confirmed to her knowledge. She is taking an ocp consistently. We did discuss due to age, transitioning to progesterone only containing ocp would be recommended. She is open to this in the near future. Due to nulliparous status, menstrual history, worsening PMS symptoms, and ongoing GI issues, will proceed with lab work. She will return in 4 weeks for an ultrasound and office visit. May consider MD consultation in the future if lab work is negative and symptoms persist.  Orders:  -     Comprehensive Metabolic Panel  -     Cortisol  -     DHEA-Sulfate  -     Estradiol  -     Follicle Stimulating Hormone  -     Hemoglobin A1c  -     Insulin, Total  -     Luteinizing Hormone  -     Progesterone  -     Testosterone  -     TSH  -     T3, Uptake  -     T4, Free         Normal GYN exam. Encouraged SBE, pt is aware how to do self breast exam and the importance of same. Discussed weight management and importance of maintaining a healthy weight. Discussed Vitamin D intake and the importance of adequate vitamin D for both bone health and a healthy immune system.  Discussed daily exercise and the importance of same, in regards to a healthy  heart as well as helping to maintain her weight and improving her mental health.  Colonoscopy is up to date. Mammogram is up to date. Pap smear is done per ASCCP guidelines. HPV is done. Lab work up is up to date.             Tanya Pemberton, APRN  9/6/2024

## 2024-09-09 LAB
GEN CATEG CVX/VAG CYTO-IMP: NORMAL
HPV I/H RISK 4 DNA CVX QL PROBE+SIG AMP: NOT DETECTED
LAB AP CASE REPORT: NORMAL
LAB AP GYN ADDITIONAL INFORMATION: NORMAL
Lab: NORMAL
PATH INTERP SPEC-IMP: NORMAL
STAT OF ADQ CVX/VAG CYTO-IMP: NORMAL

## 2024-09-21 LAB
ALBUMIN SERPL-MCNC: 4.1 G/DL (ref 3.9–4.9)
ALP SERPL-CCNC: 37 IU/L (ref 44–121)
ALT SERPL-CCNC: 13 IU/L (ref 0–32)
AST SERPL-CCNC: 14 IU/L (ref 0–40)
BILIRUB SERPL-MCNC: 1 MG/DL (ref 0–1.2)
BUN SERPL-MCNC: 9 MG/DL (ref 6–24)
BUN/CREAT SERPL: 11 (ref 9–23)
CALCIUM SERPL-MCNC: 9.1 MG/DL (ref 8.7–10.2)
CHLORIDE SERPL-SCNC: 104 MMOL/L (ref 96–106)
CO2 SERPL-SCNC: 24 MMOL/L (ref 20–29)
CORTIS SERPL-MCNC: 15.3 UG/DL (ref 6.2–19.4)
CREAT SERPL-MCNC: 0.83 MG/DL (ref 0.57–1)
DHEA-S SERPL-MCNC: 81.4 UG/DL (ref 57.3–279.2)
EGFRCR SERPLBLD CKD-EPI 2021: 91 ML/MIN/1.73
ESTRADIOL SERPL-MCNC: 89.3 PG/ML
FSH SERPL-ACNC: 3.1 MIU/ML
GLOBULIN SER CALC-MCNC: 2.5 G/DL (ref 1.5–4.5)
GLUCOSE SERPL-MCNC: 91 MG/DL (ref 70–99)
HBA1C MFR BLD: 5.5 % (ref 4.8–5.6)
INSULIN SERPL-ACNC: 4.4 UIU/ML (ref 2.6–24.9)
LH SERPL-ACNC: 4.7 MIU/ML
POTASSIUM SERPL-SCNC: 4.4 MMOL/L (ref 3.5–5.2)
PROGEST SERPL-MCNC: 0.2 NG/ML
PROT SERPL-MCNC: 6.6 G/DL (ref 6–8.5)
SODIUM SERPL-SCNC: 139 MMOL/L (ref 134–144)
T3RU NFR SERPL: 24 % (ref 24–39)
T4 FREE SERPL-MCNC: 1.41 NG/DL (ref 0.82–1.77)
TESTOST SERPL-MCNC: 5 NG/DL (ref 8–60)
TSH SERPL DL<=0.005 MIU/L-ACNC: 1.42 UIU/ML (ref 0.45–4.5)

## 2024-10-04 ENCOUNTER — OFFICE VISIT (OUTPATIENT)
Dept: OBSTETRICS AND GYNECOLOGY | Age: 39
End: 2024-10-04
Payer: COMMERCIAL

## 2024-10-04 VITALS
HEIGHT: 66 IN | BODY MASS INDEX: 28.45 KG/M2 | WEIGHT: 177 LBS | DIASTOLIC BLOOD PRESSURE: 70 MMHG | SYSTOLIC BLOOD PRESSURE: 110 MMHG

## 2024-10-04 DIAGNOSIS — N92.1 IRREGULAR INTERMENSTRUAL BLEEDING: Primary | ICD-10-CM

## 2024-10-04 DIAGNOSIS — Z30.011 ENCOUNTER FOR PRESCRIPTION OF ORAL CONTRACEPTIVES: ICD-10-CM

## 2024-10-04 RX ORDER — NORETHINDRONE ACETATE AND ETHINYL ESTRADIOL, ETHINYL ESTRADIOL AND FERROUS FUMARATE 1MG-10(24)
1 KIT ORAL DAILY
Qty: 90 TABLET | Refills: 0 | Status: SHIPPED | OUTPATIENT
Start: 2024-10-04 | End: 2025-01-02

## 2024-10-04 NOTE — PROGRESS NOTES
"Chief Complaint   Patient presents with    Vaginal Bleeding     Here to follow up on irregular bleeding, US done in office today.        History:  Ratna Flor is a 38 y.o. female who presents today for follow-up for evaluation of the above:  The patient presents to the office today for follow up on irregular menstrual bleeding and to discuss recent lab work.        ROS:  Review of Systems   Constitutional: Negative.    HENT: Negative.     Eyes: Negative.    Respiratory: Negative.     Cardiovascular: Negative.    Gastrointestinal:  Positive for abdominal pain (menstrual cramping).   Endocrine: Negative.    Genitourinary:  Positive for menstrual problem and vaginal bleeding.   Musculoskeletal: Negative.    Skin: Negative.    Allergic/Immunologic: Negative.    Neurological: Negative.    Hematological: Negative.    Psychiatric/Behavioral: Negative.         Ms. Flor  reports that she quit smoking about 13 years ago. Her smoking use included cigarettes. She started smoking about 18 years ago. She has a 2.5 pack-year smoking history. She has never used smokeless tobacco. She reports current alcohol use of about 1.0 standard drink of alcohol per week. She reports that she does not use drugs.      Current Outpatient Medications:     fluticasone (FLONASE) 50 MCG/ACT nasal spray, 2 sprays into the nostril(s) as directed by provider Daily. 2 puffs each nostril, Disp: 16 g, Rfl: 0    Norethin-Eth Estrad-Fe Biphas (Lo Loestrin Fe) 1 MG-10 MCG / 10 MCG tablet, Take 1 tablet by mouth Daily for 90 days., Disp: 90 tablet, Rfl: 0      OBJECTIVE:  /70   Ht 167.6 cm (65.98\")   Wt 80.3 kg (177 lb)   LMP 10/04/2024 (Exact Date)   BMI 28.58 kg/m²    Physical Exam  Vitals and nursing note reviewed.   Constitutional:       General: She is not in acute distress.     Appearance: Normal appearance. She is not ill-appearing or diaphoretic.   HENT:      Head: Normocephalic and atraumatic.   Pulmonary:      Effort: Pulmonary " effort is normal. No respiratory distress.   Musculoskeletal:         General: No deformity.      Cervical back: Normal range of motion.   Skin:     Coloration: Skin is not pale.   Neurological:      General: No focal deficit present.      Mental Status: She is alert.   Psychiatric:         Mood and Affect: Mood normal.         Behavior: Behavior normal.         Thought Content: Thought content normal.         Judgment: Judgment normal.       Assessment/Plan    Diagnoses and all orders for this visit:    1. Irregular intermenstrual bleeding (Primary)  Comments:  The patient returns to the office today for further evaluation of irregular menstrual bleeding The patient reports that menses started today. She is experiencing abdominal cramping along with heavy flow. Ultrasound was completed today and is normal. Recent lab work was completed and discussed with patient to include hormone panel. Patient is still taking ocp and discussed with patient that this can skew hormonal labs and reflect what the contraception is doing. Patient agreeable to change to a different ocp to see if this helps improve bleeding and symptoms. Had previously discussed use of progestin only ocp, but patient is not smoking and does not have other risk factors. Will try lo loestrin and will follow up in 3 months. At this OV, if symptoms have not improved, will consider transitioning care to MD for further treatment options.     2. Encounter for prescription of oral contraceptives  Comments:  Discussed with patient transitioning to use of progestin only or low dose combination ocp. Will try Lo loestrin and will follow up in 3 months.  Orders:  -     Norethin-Eth Estrad-Fe Biphas (Lo Loestrin Fe) 1 MG-10 MCG / 10 MCG tablet; Take 1 tablet by mouth Daily for 90 days.  Dispense: 90 tablet; Refill: 0          An After Visit Summary was printed and given to the patient at discharge.  Return in about 3 months (around 1/4/2025) for Recheck. Sooner if  problems arise.          Tanya Pemberton, APRN

## 2024-10-08 ENCOUNTER — LAB (OUTPATIENT)
Dept: LAB | Facility: HOSPITAL | Age: 39
End: 2024-10-08
Payer: COMMERCIAL

## 2024-10-08 ENCOUNTER — OFFICE VISIT (OUTPATIENT)
Dept: ONCOLOGY | Facility: CLINIC | Age: 39
End: 2024-10-08
Payer: COMMERCIAL

## 2024-10-08 VITALS
SYSTOLIC BLOOD PRESSURE: 118 MMHG | HEART RATE: 90 BPM | DIASTOLIC BLOOD PRESSURE: 82 MMHG | RESPIRATION RATE: 16 BRPM | BODY MASS INDEX: 29.2 KG/M2 | WEIGHT: 181.7 LBS | OXYGEN SATURATION: 97 % | TEMPERATURE: 97.3 F | HEIGHT: 66 IN

## 2024-10-08 DIAGNOSIS — K62.5 RECTAL BLEEDING: ICD-10-CM

## 2024-10-08 DIAGNOSIS — E61.1 IRON DEFICIENCY: Primary | ICD-10-CM

## 2024-10-08 LAB
ALBUMIN SERPL-MCNC: 4.2 G/DL (ref 3.5–5.2)
ALBUMIN/GLOB SERPL: 1.4 G/DL
ALP SERPL-CCNC: 44 U/L (ref 39–117)
ALT SERPL W P-5'-P-CCNC: 15 U/L (ref 1–33)
ANION GAP SERPL CALCULATED.3IONS-SCNC: 11 MMOL/L (ref 5–15)
AST SERPL-CCNC: 15 U/L (ref 1–32)
BASOPHILS # BLD AUTO: 0.03 10*3/MM3 (ref 0–0.2)
BASOPHILS NFR BLD AUTO: 0.6 % (ref 0–1.5)
BILIRUB SERPL-MCNC: 0.6 MG/DL (ref 0–1.2)
BUN SERPL-MCNC: 12 MG/DL (ref 6–20)
BUN/CREAT SERPL: 21.4 (ref 7–25)
CALCIUM SPEC-SCNC: 9.3 MG/DL (ref 8.6–10.5)
CHLORIDE SERPL-SCNC: 105 MMOL/L (ref 98–107)
CO2 SERPL-SCNC: 26 MMOL/L (ref 22–29)
CREAT SERPL-MCNC: 0.56 MG/DL (ref 0.57–1)
DEPRECATED RDW RBC AUTO: 39.2 FL (ref 37–54)
EGFRCR SERPLBLD CKD-EPI 2021: 118.5 ML/MIN/1.73
EOSINOPHIL # BLD AUTO: 0.02 10*3/MM3 (ref 0–0.4)
EOSINOPHIL NFR BLD AUTO: 0.4 % (ref 0.3–6.2)
ERYTHROCYTE [DISTWIDTH] IN BLOOD BY AUTOMATED COUNT: 12.2 % (ref 12.3–15.4)
FERRITIN SERPL-MCNC: 237.8 NG/ML (ref 13–150)
GLOBULIN UR ELPH-MCNC: 2.9 GM/DL
GLUCOSE SERPL-MCNC: 119 MG/DL (ref 65–99)
HCT VFR BLD AUTO: 37.3 % (ref 34–46.6)
HGB BLD-MCNC: 12.5 G/DL (ref 12–15.9)
HOLD SPECIMEN: NORMAL
IMM GRANULOCYTES # BLD AUTO: 0.01 10*3/MM3 (ref 0–0.05)
IMM GRANULOCYTES NFR BLD AUTO: 0.2 % (ref 0–0.5)
IRON 24H UR-MRATE: 55 MCG/DL (ref 37–145)
IRON SATN MFR SERPL: 15 % (ref 20–50)
LYMPHOCYTES # BLD AUTO: 1.66 10*3/MM3 (ref 0.7–3.1)
LYMPHOCYTES NFR BLD AUTO: 31.1 % (ref 19.6–45.3)
MCH RBC QN AUTO: 29.5 PG (ref 26.6–33)
MCHC RBC AUTO-ENTMCNC: 33.5 G/DL (ref 31.5–35.7)
MCV RBC AUTO: 88 FL (ref 79–97)
MONOCYTES # BLD AUTO: 0.45 10*3/MM3 (ref 0.1–0.9)
MONOCYTES NFR BLD AUTO: 8.4 % (ref 5–12)
NEUTROPHILS NFR BLD AUTO: 3.16 10*3/MM3 (ref 1.7–7)
NEUTROPHILS NFR BLD AUTO: 59.3 % (ref 42.7–76)
NRBC BLD AUTO-RTO: 0 /100 WBC (ref 0–0.2)
PLATELET # BLD AUTO: 261 10*3/MM3 (ref 140–450)
PMV BLD AUTO: 9.6 FL (ref 6–12)
POTASSIUM SERPL-SCNC: 4 MMOL/L (ref 3.5–5.2)
PROT SERPL-MCNC: 7.1 G/DL (ref 6–8.5)
RBC # BLD AUTO: 4.24 10*6/MM3 (ref 3.77–5.28)
SODIUM SERPL-SCNC: 142 MMOL/L (ref 136–145)
TIBC SERPL-MCNC: 367 MCG/DL (ref 298–536)
TRANSFERRIN SERPL-MCNC: 246 MG/DL (ref 200–360)
WBC NRBC COR # BLD AUTO: 5.33 10*3/MM3 (ref 3.4–10.8)

## 2024-10-08 PROCEDURE — 83540 ASSAY OF IRON: CPT

## 2024-10-08 PROCEDURE — 85025 COMPLETE CBC W/AUTO DIFF WBC: CPT

## 2024-10-08 PROCEDURE — 82728 ASSAY OF FERRITIN: CPT

## 2024-10-08 PROCEDURE — 84466 ASSAY OF TRANSFERRIN: CPT

## 2024-10-08 PROCEDURE — 36415 COLL VENOUS BLD VENIPUNCTURE: CPT

## 2024-10-08 PROCEDURE — 80053 COMPREHEN METABOLIC PANEL: CPT

## 2024-10-08 NOTE — PROGRESS NOTES
[facility] GROUP HEMATOLOGY & ONCOLOGY  [address]  [place] [address] [phone]    Patient Name: Ratna Flor  Encounter Date: 10/08/2024   YOB: 1985  Patient Number: [id number]    Hematology / Oncology Progress Note    HPI / REASON FOR VISIT: Ratna Flor is a 38 y.o. female who is followed by this office for iron deficiency anemia.    She has been unable to tolerate oral iron and received IV iron with Venofer      She sees GI at [place].  Her last visit there was 07/17/22.  She had Flexible Sigmoidoscopy on 07/19/22 and per note,that path was negative and no signs of IBD. MD  recommended that she get a MRE and a capsule endoscopy.  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    -Venofer infusion  05/06/22, Dec 2 and 5, 2022.    -Venofer infusions:  March 3, 10, 17 and 24, 2023   Feraheme September 1 and 8, 2023      INTERVAL HISTORY     She presents to clinic today for continued follow up.     Pt states her bleeding has been controlled with only intermittned light bleeding.   She had labs drawn today and results were reviewed in office.       LABS    Lab Results - Last 18 Months   Lab Units 10/08/24  1433 07/08/24  1528 03/05/24  1328 11/22/23  1059 10/06/23  1309 08/24/23  1422   HEMOGLOBIN g/dL 12.5 12.1 12.2 12.3 12.3 10.5*   HEMATOCRIT % 37.3 35.9 36.4 36.7 37.4 32.3*   MCV fL 88.0 87.3 89.0 87.4 89.5 89.0   WBC 10*3/mm3 5.33 7.29 5.74 4.70 4.58 4.28   RDW % 12.2* 12.3 11.7* 12.1* 11.9* 12.4   MPV fL 9.6 9.4 9.9 10.3 10.0 10.3   PLATELETS 10*3/mm3 261 249 226 239 221 213   IMM GRAN % % 0.2 0.1 0.2 0.2 0.2 0.2   NEUTROS ABS 10*3/mm3 3.16 5.09 3.98 2.92 3.03 2.46   LYMPHS ABS 10*3/mm3 1.66 1.52 1.29 1.32 1.09 1.41   MONOS ABS 10*3/mm3 0.45 0.61 0.42 0.40 0.39 0.35   EOS ABS 10*3/mm3  0.02 0.04 0.02 0.03 0.03 0.03   BASOS ABS 10*3/mm3 0.03 0.02 0.02 0.02 0.03 0.02   IMMATURE GRANS (ABS) 10*3/mm3 0.01 0.01 0.01 0.01 0.01 0.01   NRBC /100 WBC 0.0 0.0 0.0 0.0 0.0 0.0       Lab Results - Last 18 Months   Lab Units 10/08/24  1433 09/20/24  0717 07/08/24  1528 03/05/24  1328 11/22/23  1059 10/06/23  1309 08/24/23  1422   GLUCOSE mg/dL 119* 91 100* 96 107* 92 109*   SODIUM mmol/L 142 139 139 139 140 141 138   POTASSIUM mmol/L 4.0 4.4 3.9 3.9 4.1 4.1 3.8   CO2 mmol/L 26.0 24 27.0 26.0 26.0 25.0 23.0   CHLORIDE mmol/L 105 104 104 103 106 107 104   ANION GAP mmol/L 11.0  --  8.0 10.0 8.0 9.0 11.0   CREATININE mg/dL 0.56* 0.83 0.67 0.57 0.56* 0.71 0.52*   BUN mg/dL 12 9 11 14 10 9 10   BUN / CREAT RATIO  21.4 11 16.4 24.6 17.9 12.7 19.2   CALCIUM mg/dL 9.3 9.1 9.1 8.9 8.8 9.0 9.6   ALK PHOS U/L 44 37* 37* 37* 35* 34* 35*   TOTAL PROTEIN g/dL 7.1  --  7.0 6.9 6.6 6.6 6.7   ALT (SGPT) U/L 15 13 16 12 8 8 7   AST (SGOT) U/L 15 14 15 13 11 13 10   BILIRUBIN mg/dL 0.6 1.0 1.2 0.9 0.8 0.8 0.6   ALBUMIN g/dL 4.2 4.1 4.1 4.2 4.1 4.1 4.4   GLOBULIN gm/dL 2.9  --  2.9 2.7 2.5 2.5 2.3       Lab Results - Last 18 Months   Lab Units 08/11/23  1108   REFERENCE LAB REPORT  SEE ATTACHED REPORT         Lab Results - Last 18 Months   Lab Units 10/08/24  1433 09/20/24  0717 07/08/24  1528 03/05/24  1328 11/22/23  1059 10/06/23  1309 08/24/23  1422   IRON mcg/dL 55  --  31* 48 103 90 32*   TIBC mcg/dL 367  --  361 329 329 323 329   IRON SATURATION (TSAT) % 15*  --  9* 15* 31 28 10*   FERRITIN ng/mL 237.80*  --  250.70* 295.00* 280.00* 369.80* 94.88   TSH uIU/mL  --  1.420  --   --   --   --   --          PAST MEDICAL HISTORY:  ALLERGIES:  No Known Allergies  CURRENT MEDICATIONS:  Outpatient Encounter Medications as of 10/8/2024   Medication Sig Dispense Refill    fluticasone (FLONASE) 50 MCG/ACT nasal spray 2 sprays into the nostril(s) as directed by provider Daily. 2 puffs each nostril 16 g 0    Norethin-Eth Estrad-Fe Biphas  (Lo Loestrin Fe) 1 MG-10 MCG / 10 MCG tablet Take 1 tablet by mouth Daily for 90 days. 90 tablet 0     No facility-administered encounter medications on file as of 10/8/2024.     ADULT ILLNESSES:  Patient Active Problem List   Diagnosis Code    Other constipation K59.09    Rectal bleeding K62.5    Other hemorrhoids K64.8    Epigastric pain R10.13    Gastroesophageal reflux disease with esophagitis K21.00    Nausea R11.0    Gastrointestinal hemorrhage K92.2    Anemia D64.9    Iron deficiency anemia D50.9    Abnormal CT scan abdomen R93.89    Anemia, blood loss D50.0    Blood in the stool K92.1    Lower abdominal pain R10.30    Leukopenia D72.819    Iron deficiency E61.1    Colitis K52.9     SURGERIES:  Past Surgical History:   Procedure Laterality Date    CAPSULE ENDOSCOPY N/A 4/16/2021    Procedure: CAPSULE ENDOSCOPY M2A;  Surgeon: Octavia Mattson MD;  Location: [facility] ENDOSCOPY;  Service: Gastroenterology;  Laterality: N/A; Normal exam.  No blood in lumen.  Capsule seen entering the colon.    COLONOSCOPY N/A 5/22/2019    Non-bleeding internal hemorrhoids; The examination was otherwise normal; No specimens collected; Repeat colonoscopy at age 48 for screening purposes    COLONOSCOPY N/A 3/18/2021    Dr. Hernandez-The examined portion of the ileum was normal; Erythematous mucosa in the rectum-biopsied; Non-bleeding internal hemorrhoids    ENDOSCOPY N/A 6/17/2020    LA Grade A reflux esophagitis; Normal stomach-biopsied; Normal examined duodenum    ENDOSCOPY N/A 3/18/2021    Dr. Hernandez-Z-line irregular-biopsied; Normal stomach-biopsied; Normal first portion of the duodenum and second portion of the duodenum    WISDOM TOOTH EXTRACTION       HEALTH MAINTENANCE ITEMS:  Health Maintenance Due   Topic Date Due    ANNUAL PHYSICAL  Never done    INFLUENZA VACCINE  08/01/2024    COVID-19 Vaccine (4 - 2023-24 season) 09/01/2024       <no information>  Last Completed Colonoscopy       This patient has no relevant Health  Maintenance data.          Immunization History   Administered Date(s) Administered    COVID-19 (MODERNA) 1st,2nd,3rd Dose Monovalent 2021, 2021    COVID-19 (MODERNA) Monovalent Original Booster 2022     Last Completed Mammogram            MAMMOGRAM (Every 2 Years) Next due on 3/8/2026      2024  Mammo Screening Digital Tomosynthesis Bilateral With CAD                      FAMILY HISTORY:  Family History   Problem Relation Age of Onset    Heart defect Mother     No Known Problems Father     Hypertension Maternal Grandmother     No Known Problems Maternal Grandfather     No Known Problems Paternal Grandmother     Prostate cancer Paternal Grandfather     Pancreatic cancer Paternal Grandfather     Colon cancer Neg Hx     Colon polyps Neg Hx     Esophageal cancer Neg Hx     Liver cancer Neg Hx     Stomach cancer Neg Hx     Rectal cancer Neg Hx     Liver disease Neg Hx      SOCIAL HISTORY:  Social History     Socioeconomic History    Marital status:    Tobacco Use    Smoking status: Former     Current packs/day: 0.00     Average packs/day: 0.5 packs/day for 5.0 years (2.5 ttl pk-yrs)     Types: Cigarettes     Start date:      Quit date:      Years since quittin.8    Smokeless tobacco: Never    Tobacco comments:     Don’t remember dates, quit year    Vaping Use    Vaping status: Never Used   Substance and Sexual Activity    Alcohol use: Yes     Alcohol/week: 1.0 standard drink of alcohol     Types: 1 Glasses of wine per week     Comment: once in a blue moon liquor    Drug use: No    Sexual activity: Yes     Partners: Male     Birth control/protection: Birth control pill       REVIEW OF SYSTEMS:  Review of Systems   Constitutional:  Negative for fatigue and fever.   HENT:  Negative for trouble swallowing.    Respiratory:  Negative for cough and shortness of breath.    Cardiovascular:  Negative for chest pain, palpitations and leg swelling.   Gastrointestinal:  Positive for  "abdominal pain, blood in stool and nausea. Negative for vomiting.   Genitourinary:  Negative for hematuria.   Musculoskeletal:  Negative for arthralgias and myalgias.   Skin:  Negative for rash and skin lesions.   Neurological:  Negative for dizziness, syncope, memory problem and confusion.   Psychiatric/Behavioral:  Negative for suicidal ideas and depressed mood. The patient is not nervous/anxious.        /82   Pulse 90   Temp 97.3 °F (36.3 °C)   Resp 16   Ht 167.6 cm (66\")   Wt 82.4 kg (181 lb 11.2 oz)   LMP 10/04/2024 (Exact Date)   SpO2 97%   BMI 29.33 kg/m²  Body surface area is 1.92 meters squared.    Pain Score    10/08/24 1439   PainSc: 0-No pain     Physical Exam  Constitutional:       Appearance: Normal appearance.   HENT:      Head: Normocephalic and atraumatic.   Cardiovascular:      Rate and Rhythm: Normal rate and regular rhythm.   Pulmonary:      Effort: Pulmonary effort is normal.      Breath sounds: Normal breath sounds.   Abdominal:      General: Bowel sounds are normal.      Palpations: Abdomen is soft.   Musculoskeletal:      Right lower leg: No edema.      Left lower leg: No edema.   Skin:     General: Skin is warm and dry.   Neurological:      Mental Status: She is alert and oriented to person, place, and time.   Psychiatric:         Attention and Perception: Attention normal.         Mood and Affect: Mood normal.         Judgment: Judgment normal.     I have reviewed the PHYSICAL EXAM and the accuracy of it. No changes since the information was documented.  Karena Tong, APRN 10/08/2024      Ratnachristiano Velez Basia reports a pain score of 0.  Given her pain assessment as noted, treatment options were discussed and the following options were decided upon as a follow-up plan to address the patient's pain:  No intervention indicated .      ASSESSMENT / PLAN    1. Iron deficiency    2. Rectal bleeding    ASSESSMENT:      1.  Iron Deficiency   -Venofer infusion  05/06/22, Dec 2 and 5, " 2022.    -Venofer infusions:  March 3, 10, 17 and 24, 2023   -Feraheme infusion September 1 and 8, 2023   -Labs today:  Hgb 12.5, Hct 37.3, Iron 55, Ferritin 237, Sat 15%, TIBC 367  -Ferritin adequate, Hgb normal   -Stable for observation    2.   Rectal Bleeding   -Was following with GI at .  -Seeing local GI         PLAN:  -Stable for observation   Labs only in 3 months.   RTC in 6 months  Patient will have preoffice labs for  CBC, CMP, Anemia Profile and Ferritin   Continue current medications, treatment plans and follow up with PCP and any other providers.  Care discussed with patient.  Understanding expressed.  Patient agreeable with plan.        Karena Tong, APRN  10/08/2024

## 2024-12-29 DIAGNOSIS — Z30.011 ENCOUNTER FOR PRESCRIPTION OF ORAL CONTRACEPTIVES: ICD-10-CM

## 2024-12-30 RX ORDER — NORETHINDRONE ACETATE AND ETHINYL ESTRADIOL, ETHINYL ESTRADIOL AND FERROUS FUMARATE 1MG-10(24)
1 KIT ORAL DAILY
Qty: 30 TABLET | Refills: 0 | Status: SHIPPED | OUTPATIENT
Start: 2024-12-30

## 2025-01-08 ENCOUNTER — OFFICE VISIT (OUTPATIENT)
Dept: OBSTETRICS AND GYNECOLOGY | Age: 40
End: 2025-01-08
Payer: COMMERCIAL

## 2025-01-08 ENCOUNTER — LAB (OUTPATIENT)
Dept: LAB | Facility: HOSPITAL | Age: 40
End: 2025-01-08
Payer: COMMERCIAL

## 2025-01-08 VITALS
HEIGHT: 66 IN | BODY MASS INDEX: 30.86 KG/M2 | SYSTOLIC BLOOD PRESSURE: 119 MMHG | WEIGHT: 192 LBS | DIASTOLIC BLOOD PRESSURE: 83 MMHG

## 2025-01-08 DIAGNOSIS — Z30.011 ENCOUNTER FOR PRESCRIPTION OF ORAL CONTRACEPTIVES: ICD-10-CM

## 2025-01-08 DIAGNOSIS — E61.1 IRON DEFICIENCY: ICD-10-CM

## 2025-01-08 LAB
ALBUMIN SERPL-MCNC: 4.3 G/DL (ref 3.5–5.2)
ALBUMIN/GLOB SERPL: 1.6 G/DL
ALP SERPL-CCNC: 43 U/L (ref 39–117)
ALT SERPL W P-5'-P-CCNC: 15 U/L (ref 1–33)
ANION GAP SERPL CALCULATED.3IONS-SCNC: 10 MMOL/L (ref 5–15)
AST SERPL-CCNC: 14 U/L (ref 1–32)
BASOPHILS # BLD AUTO: 0.03 10*3/MM3 (ref 0–0.2)
BASOPHILS NFR BLD AUTO: 0.6 % (ref 0–1.5)
BILIRUB SERPL-MCNC: 0.9 MG/DL (ref 0–1.2)
BUN SERPL-MCNC: 12 MG/DL (ref 6–20)
BUN/CREAT SERPL: 23.5 (ref 7–25)
CALCIUM SPEC-SCNC: 9 MG/DL (ref 8.6–10.5)
CHLORIDE SERPL-SCNC: 104 MMOL/L (ref 98–107)
CO2 SERPL-SCNC: 24 MMOL/L (ref 22–29)
CREAT SERPL-MCNC: 0.51 MG/DL (ref 0.57–1)
DEPRECATED RDW RBC AUTO: 37.5 FL (ref 37–54)
EGFRCR SERPLBLD CKD-EPI 2021: 120.4 ML/MIN/1.73
EOSINOPHIL # BLD AUTO: 0.05 10*3/MM3 (ref 0–0.4)
EOSINOPHIL NFR BLD AUTO: 1 % (ref 0.3–6.2)
ERYTHROCYTE [DISTWIDTH] IN BLOOD BY AUTOMATED COUNT: 11.7 % (ref 12.3–15.4)
FERRITIN SERPL-MCNC: 185 NG/ML (ref 13–150)
GLOBULIN UR ELPH-MCNC: 2.7 GM/DL
GLUCOSE SERPL-MCNC: 92 MG/DL (ref 65–99)
HCT VFR BLD AUTO: 38.1 % (ref 34–46.6)
HGB BLD-MCNC: 12.8 G/DL (ref 12–15.9)
IMM GRANULOCYTES # BLD AUTO: 0 10*3/MM3 (ref 0–0.05)
IMM GRANULOCYTES NFR BLD AUTO: 0 % (ref 0–0.5)
IRON 24H UR-MRATE: 84 MCG/DL (ref 37–145)
IRON SATN MFR SERPL: 24 % (ref 20–50)
LYMPHOCYTES # BLD AUTO: 1.23 10*3/MM3 (ref 0.7–3.1)
LYMPHOCYTES NFR BLD AUTO: 23.7 % (ref 19.6–45.3)
MCH RBC QN AUTO: 29.2 PG (ref 26.6–33)
MCHC RBC AUTO-ENTMCNC: 33.6 G/DL (ref 31.5–35.7)
MCV RBC AUTO: 87 FL (ref 79–97)
MONOCYTES # BLD AUTO: 0.55 10*3/MM3 (ref 0.1–0.9)
MONOCYTES NFR BLD AUTO: 10.6 % (ref 5–12)
NEUTROPHILS NFR BLD AUTO: 3.32 10*3/MM3 (ref 1.7–7)
NEUTROPHILS NFR BLD AUTO: 64.1 % (ref 42.7–76)
NRBC BLD AUTO-RTO: 0 /100 WBC (ref 0–0.2)
PLATELET # BLD AUTO: 219 10*3/MM3 (ref 140–450)
PMV BLD AUTO: 9.4 FL (ref 6–12)
POTASSIUM SERPL-SCNC: 4.1 MMOL/L (ref 3.5–5.2)
PROT SERPL-MCNC: 7 G/DL (ref 6–8.5)
RBC # BLD AUTO: 4.38 10*6/MM3 (ref 3.77–5.28)
SODIUM SERPL-SCNC: 138 MMOL/L (ref 136–145)
TIBC SERPL-MCNC: 355 MCG/DL (ref 298–536)
TRANSFERRIN SERPL-MCNC: 238 MG/DL (ref 200–360)
WBC NRBC COR # BLD AUTO: 5.18 10*3/MM3 (ref 3.4–10.8)

## 2025-01-08 PROCEDURE — 82728 ASSAY OF FERRITIN: CPT

## 2025-01-08 PROCEDURE — 84466 ASSAY OF TRANSFERRIN: CPT

## 2025-01-08 PROCEDURE — 83540 ASSAY OF IRON: CPT

## 2025-01-08 PROCEDURE — 36415 COLL VENOUS BLD VENIPUNCTURE: CPT

## 2025-01-08 PROCEDURE — 80053 COMPREHEN METABOLIC PANEL: CPT

## 2025-01-08 PROCEDURE — 85025 COMPLETE CBC W/AUTO DIFF WBC: CPT

## 2025-01-08 RX ORDER — NORETHINDRONE ACETATE AND ETHINYL ESTRADIOL, ETHINYL ESTRADIOL AND FERROUS FUMARATE 1MG-10(24)
1 KIT ORAL DAILY
Qty: 90 TABLET | Refills: 3 | Status: SHIPPED | OUTPATIENT
Start: 2025-01-08 | End: 2025-04-08

## 2025-01-08 NOTE — PROGRESS NOTES
"Chief Complaint   Patient presents with    Contraception     Patient is here today for a 3 month GYN follow up regarding OCP. Patient is currently on Lo Loestrin and states her cycles have been regular length wise but she is having some breakthrough bleeding. Pelvic pain and cramping has improved. Patient states she feels like her hormones have leveled out. Patient denies any other problems or concerns.        History:  Ratna Flor is a 39 y.o. female who presents today for follow-up for evaluation of the above:  The patient returns to the office today for her 3 month follow up after changing ocp to Lo Loestrin.         ROS:  Review of Systems   Constitutional: Negative.    HENT: Negative.     Eyes: Negative.    Respiratory: Negative.     Cardiovascular: Negative.    Gastrointestinal: Negative.    Endocrine: Negative.    Genitourinary: Negative.  Negative for menstrual problem.   Musculoskeletal: Negative.    Skin: Negative.    Allergic/Immunologic: Negative.    Neurological: Negative.    Hematological: Negative.    Psychiatric/Behavioral: Negative.         Ms. Flor  reports that she quit smoking about 14 years ago. Her smoking use included cigarettes. She started smoking about 19 years ago. She has a 2.5 pack-year smoking history. She has never used smokeless tobacco. She reports current alcohol use of about 1.0 standard drink of alcohol per week. She reports that she does not use drugs.      Current Outpatient Medications:     fluticasone (FLONASE) 50 MCG/ACT nasal spray, 2 sprays into the nostril(s) as directed by provider Daily. 2 puffs each nostril, Disp: 16 g, Rfl: 0    Norethin-Eth Estrad-Fe Biphas (Lo Loestrin Fe) 1 MG-10 MCG / 10 MCG tablet, Take 1 tablet by mouth Daily for 90 days., Disp: 90 tablet, Rfl: 3      OBJECTIVE:  /83   Ht 167.6 cm (66\")   Wt 87.1 kg (192 lb)   LMP 12/20/2024 (Approximate)   BMI 30.99 kg/m²    Physical Exam  Vitals and nursing note reviewed.   Constitutional:  " "     General: She is not in acute distress.     Appearance: Normal appearance. She is not ill-appearing or diaphoretic.   HENT:      Head: Normocephalic and atraumatic.   Pulmonary:      Effort: Pulmonary effort is normal. No respiratory distress.   Musculoskeletal:         General: No deformity.      Cervical back: Normal range of motion.   Skin:     Coloration: Skin is not pale.   Neurological:      General: No focal deficit present.      Mental Status: She is alert.   Psychiatric:         Mood and Affect: Mood normal.         Behavior: Behavior normal.         Thought Content: Thought content normal.         Judgment: Judgment normal.       Assessment/Plan    Diagnoses and all orders for this visit:    1. Encounter for prescription of oral contraceptives  Comments:  Patient returns to the office today for her 3 month follow up after changing ocp to Lo Loestrin. She reports cramping has improved. She did have 1 episode of btb, otherwise, periods are regular. She does feel moods have improved and feels more \"even keel\". She does wish to continue use of ocp. Refills sent to pharmacy. 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   Orders:  -     Norethin-Eth Estrad-Fe Biphas (Lo Loestrin Fe) 1 MG-10 MCG / 10 MCG tablet; Take 1 tablet by mouth Daily for 90 days.  Dispense: 90 tablet; Refill: 3          An After Visit Summary was printed and given to the patient at discharge.  Return in about 8 months (around 9/8/2025) for Annual physical. Sooner if problems arise.          MARIA INES Gaytan  "

## 2025-04-01 ENCOUNTER — LAB (OUTPATIENT)
Dept: LAB | Facility: HOSPITAL | Age: 40
End: 2025-04-01
Payer: COMMERCIAL

## 2025-04-01 DIAGNOSIS — E61.1 IRON DEFICIENCY: ICD-10-CM

## 2025-04-01 LAB
ALBUMIN SERPL-MCNC: 4.1 G/DL (ref 3.5–5.2)
ALBUMIN/GLOB SERPL: 1.5 G/DL
ALP SERPL-CCNC: 39 U/L (ref 39–117)
ALT SERPL W P-5'-P-CCNC: 15 U/L (ref 1–33)
ANION GAP SERPL CALCULATED.3IONS-SCNC: 9 MMOL/L (ref 5–15)
AST SERPL-CCNC: 14 U/L (ref 1–32)
BASOPHILS # BLD AUTO: 0.02 10*3/MM3 (ref 0–0.2)
BASOPHILS NFR BLD AUTO: 0.4 % (ref 0–1.5)
BILIRUB SERPL-MCNC: 1.3 MG/DL (ref 0–1.2)
BUN SERPL-MCNC: 11 MG/DL (ref 6–20)
BUN/CREAT SERPL: 20.8 (ref 7–25)
CALCIUM SPEC-SCNC: 8.7 MG/DL (ref 8.6–10.5)
CHLORIDE SERPL-SCNC: 105 MMOL/L (ref 98–107)
CO2 SERPL-SCNC: 25 MMOL/L (ref 22–29)
CREAT SERPL-MCNC: 0.53 MG/DL (ref 0.57–1)
DEPRECATED RDW RBC AUTO: 38.9 FL (ref 37–54)
EGFRCR SERPLBLD CKD-EPI 2021: 119.3 ML/MIN/1.73
EOSINOPHIL # BLD AUTO: 0.03 10*3/MM3 (ref 0–0.4)
EOSINOPHIL NFR BLD AUTO: 0.6 % (ref 0.3–6.2)
ERYTHROCYTE [DISTWIDTH] IN BLOOD BY AUTOMATED COUNT: 12.1 % (ref 12.3–15.4)
FERRITIN SERPL-MCNC: 174.1 NG/ML (ref 13–150)
GLOBULIN UR ELPH-MCNC: 2.7 GM/DL
GLUCOSE SERPL-MCNC: 89 MG/DL (ref 65–99)
HCT VFR BLD AUTO: 37.2 % (ref 34–46.6)
HGB BLD-MCNC: 13 G/DL (ref 12–15.9)
IMM GRANULOCYTES # BLD AUTO: 0.02 10*3/MM3 (ref 0–0.05)
IMM GRANULOCYTES NFR BLD AUTO: 0.4 % (ref 0–0.5)
IRON 24H UR-MRATE: 158 MCG/DL (ref 37–145)
IRON SATN MFR SERPL: 43 % (ref 20–50)
LYMPHOCYTES # BLD AUTO: 1.31 10*3/MM3 (ref 0.7–3.1)
LYMPHOCYTES NFR BLD AUTO: 28.1 % (ref 19.6–45.3)
MCH RBC QN AUTO: 30.6 PG (ref 26.6–33)
MCHC RBC AUTO-ENTMCNC: 34.9 G/DL (ref 31.5–35.7)
MCV RBC AUTO: 87.5 FL (ref 79–97)
MONOCYTES # BLD AUTO: 0.41 10*3/MM3 (ref 0.1–0.9)
MONOCYTES NFR BLD AUTO: 8.8 % (ref 5–12)
NEUTROPHILS NFR BLD AUTO: 2.88 10*3/MM3 (ref 1.7–7)
NEUTROPHILS NFR BLD AUTO: 61.7 % (ref 42.7–76)
NRBC BLD AUTO-RTO: 0 /100 WBC (ref 0–0.2)
PLATELET # BLD AUTO: 223 10*3/MM3 (ref 140–450)
PMV BLD AUTO: 9.6 FL (ref 6–12)
POTASSIUM SERPL-SCNC: 4 MMOL/L (ref 3.5–5.2)
PROT SERPL-MCNC: 6.8 G/DL (ref 6–8.5)
RBC # BLD AUTO: 4.25 10*6/MM3 (ref 3.77–5.28)
SODIUM SERPL-SCNC: 139 MMOL/L (ref 136–145)
TIBC SERPL-MCNC: 364 MCG/DL (ref 298–536)
TRANSFERRIN SERPL-MCNC: 244 MG/DL (ref 200–360)
WBC NRBC COR # BLD AUTO: 4.67 10*3/MM3 (ref 3.4–10.8)

## 2025-04-01 PROCEDURE — 80053 COMPREHEN METABOLIC PANEL: CPT

## 2025-04-01 PROCEDURE — 84466 ASSAY OF TRANSFERRIN: CPT

## 2025-04-01 PROCEDURE — 83540 ASSAY OF IRON: CPT

## 2025-04-01 PROCEDURE — 36415 COLL VENOUS BLD VENIPUNCTURE: CPT

## 2025-04-01 PROCEDURE — 82728 ASSAY OF FERRITIN: CPT

## 2025-04-01 PROCEDURE — 85025 COMPLETE CBC W/AUTO DIFF WBC: CPT

## 2025-04-08 ENCOUNTER — OFFICE VISIT (OUTPATIENT)
Dept: ONCOLOGY | Facility: CLINIC | Age: 40
End: 2025-04-08
Payer: COMMERCIAL

## 2025-04-08 VITALS
WEIGHT: 193 LBS | DIASTOLIC BLOOD PRESSURE: 84 MMHG | HEIGHT: 66 IN | RESPIRATION RATE: 16 BRPM | HEART RATE: 84 BPM | TEMPERATURE: 98.4 F | SYSTOLIC BLOOD PRESSURE: 136 MMHG | OXYGEN SATURATION: 99 % | BODY MASS INDEX: 31.02 KG/M2

## 2025-04-08 DIAGNOSIS — E61.1 IRON DEFICIENCY: Primary | ICD-10-CM

## 2025-04-08 DIAGNOSIS — R17 ELEVATED BILIRUBIN: ICD-10-CM

## 2025-04-08 PROCEDURE — 99214 OFFICE O/P EST MOD 30 MIN: CPT | Performed by: NURSE PRACTITIONER

## 2025-04-08 NOTE — PROGRESS NOTES
[facility] GROUP HEMATOLOGY & ONCOLOGY  [address]  [facility] [address] [phone]    Patient Name: Ratna Flor  Encounter Date: 04/08/2025   YOB: 1985  Patient Number: [id number]    Hematology / Oncology Progress Note    HPI / REASON FOR VISIT: Ratna Flor is a 39 y.o. female who is followed by this office for iron deficiency anemia.    She has been unable to tolerate oral iron and received IV iron with Venofer      She sees GI at [facility].  Her last visit there was 07/17/22.  She had Flexible Sigmoidoscopy on 07/19/22 and per note,that path was negative and no signs of IBD. MD  recommended that she get a MRE and a capsule endoscopy.  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    -Venofer infusion  05/06/22, Dec 2 and 5, 2022.    -Venofer infusions:  March 3, 10, 17 and 24, 2023   Feraheme September 1 and 8, 2023      INTERVAL HISTORY        History of Present Illness  The patient presents for evaluation of  iron levels.    She has no acute complaints today.  She it taking oral iron intermittently.    She had labs drawn 04/01 and results were reviewed with her in office.         LABS    Lab Results - Last 18 Months   Lab Units 04/01/25  0902 01/08/25  0937 10/08/24  1433 07/08/24  1528 03/05/24  1328 11/22/23  1059   HEMOGLOBIN g/dL 13.0 12.8 12.5 12.1 12.2 12.3   HEMATOCRIT % 37.2 38.1 37.3 35.9 36.4 36.7   MCV fL 87.5 87.0 88.0 87.3 89.0 87.4   WBC 10*3/mm3 4.67 5.18 5.33 7.29 5.74 4.70   RDW % 12.1* 11.7* 12.2* 12.3 11.7* 12.1*   MPV fL 9.6 9.4 9.6 9.4 9.9 10.3   PLATELETS 10*3/mm3 223 219 261 249 226 239   IMM GRAN % % 0.4 0.0 0.2 0.1 0.2 0.2   NEUTROS ABS 10*3/mm3 2.88 3.32 3.16 5.09 3.98 2.92   LYMPHS ABS 10*3/mm3 1.31 1.23 1.66 1.52 1.29 1.32   MONOS ABS 10*3/mm3 0.41 0.55 0.45 0.61  "0.42 0.40   EOS ABS 10*3/mm3 0.03 0.05 0.02 0.04 0.02 0.03   BASOS ABS 10*3/mm3 0.02 0.03 0.03 0.02 0.02 0.02   IMMATURE GRANS (ABS) 10*3/mm3 0.02 0.00 0.01 0.01 0.01 0.01   NRBC /100 WBC 0.0 0.0 0.0 0.0 0.0 0.0       Lab Results - Last 18 Months   Lab Units 04/01/25  0902 01/08/25  0937 10/08/24  1433 09/20/24  0717 07/08/24  1528 03/05/24  1328 11/22/23  1059   GLUCOSE mg/dL 89 92 119* 91 100* 96 107*   SODIUM mmol/L 139 138 142 139 139 139 140   POTASSIUM mmol/L 4.0 4.1 4.0 4.4 3.9 3.9 4.1   CO2 mmol/L 25.0 24.0 26.0 24 27.0 26.0 26.0   CHLORIDE mmol/L 105 104 105 104 104 103 106   ANION GAP mmol/L 9.0 10.0 11.0  --  8.0 10.0 8.0   CREATININE mg/dL 0.53* 0.51* 0.56* 0.83 0.67 0.57 0.56*   BUN mg/dL 11 12 12 9 11 14 10   BUN / CREAT RATIO  20.8 23.5 21.4 11 16.4 24.6 17.9   CALCIUM mg/dL 8.7 9.0 9.3 9.1 9.1 8.9 8.8   ALK PHOS U/L 39 43 44 37* 37* 37* 35*   TOTAL PROTEIN g/dL 6.8 7.0 7.1 6.6 7.0 6.9 6.6   ALT (SGPT) U/L 15 15 15 13 16 12 8   AST (SGOT) U/L 14 14 15 14 15 13 11   BILIRUBIN mg/dL 1.3* 0.9 0.6 1.0 1.2 0.9 0.8   ALBUMIN g/dL 4.1 4.3 4.2 4.1 4.1 4.2 4.1   GLOBULIN gm/dL 2.7 2.7 2.9  --  2.9 2.7 2.5   GLOBULINREF g/dL  --   --   --  2.5  --   --   --        No results for input(s): \"MSPIKE\", \"KAPPALAMB\", \"IGLFLC\", \"URICACID\", \"FREEKAPPAL\", \"CEA\", \"LDH\", \"REFLABREPO\" in the last [id number] hours.        Lab Results - Last 18 Months   Lab Units 04/01/25  0902 01/08/25  0937 10/08/24  1433 09/20/24  0717 07/08/24  1528 03/05/24  1328 11/22/23  1059   IRON mcg/dL 158* 84 55  --  31* 48 103   TIBC mcg/dL 364 355 367  --  361 329 329   IRON SATURATION (TSAT) % 43 24 15*  --  9* 15* 31   FERRITIN ng/mL 174.10* 185.00* 237.80*  --  250.70* 295.00* 280.00*   TSH uIU/mL  --   --   --  1.420  --   --   --          PAST MEDICAL HISTORY:  ALLERGIES:  No Known Allergies  CURRENT MEDICATIONS:  Outpatient Encounter Medications as of 4/8/2025   Medication Sig Dispense Refill    fluticasone (FLONASE) 50 MCG/ACT nasal spray 2 " sprays into the nostril(s) as directed by provider Daily. 2 puffs each nostril 16 g 0    Norethin-Eth Estrad-Fe Biphas (Lo Loestrin Fe) 1 MG-10 MCG / 10 MCG tablet Take 1 tablet by mouth Daily for 90 days. 90 tablet 3     No facility-administered encounter medications on file as of 4/8/2025.     ADULT ILLNESSES:  Patient Active Problem List   Diagnosis Code    Other constipation K59.09    Rectal bleeding K62.5    Other hemorrhoids K64.8    Epigastric pain R10.13    Gastroesophageal reflux disease with esophagitis K21.00    Nausea R11.0    Gastrointestinal hemorrhage K92.2    Anemia D64.9    Iron deficiency anemia D50.9    Abnormal CT scan abdomen R93.89    Anemia, blood loss D50.0    Blood in the stool K92.1    Lower abdominal pain R10.30    Leukopenia D72.819    Iron deficiency E61.1    Colitis K52.9     SURGERIES:  Past Surgical History:   Procedure Laterality Date    CAPSULE ENDOSCOPY N/A 4/16/2021    Procedure: CAPSULE ENDOSCOPY M2A;  Surgeon: Octavia Mattson MD;  Location: [facility] ENDOSCOPY;  Service: Gastroenterology;  Laterality: N/A; Normal exam.  No blood in lumen.  Capsule seen entering the colon.    COLONOSCOPY N/A 5/22/2019    Non-bleeding internal hemorrhoids; The examination was otherwise normal; No specimens collected; Repeat colonoscopy at age 48 for screening purposes    COLONOSCOPY N/A 3/18/2021    Dr. Hernandez-The examined portion of the ileum was normal; Erythematous mucosa in the rectum-biopsied; Non-bleeding internal hemorrhoids    ENDOSCOPY N/A 6/17/2020    LA Grade A reflux esophagitis; Normal stomach-biopsied; Normal examined duodenum    ENDOSCOPY N/A 3/18/2021    Dr. Hernandez-Z-line irregular-biopsied; Normal stomach-biopsied; Normal first portion of the duodenum and second portion of the duodenum    WISDOM TOOTH EXTRACTION       HEALTH MAINTENANCE ITEMS:  Health Maintenance Due   Topic Date Due    ANNUAL PHYSICAL  Never done    COVID-19 Vaccine (4 - 2024-25 season) 09/01/2024       <no  information>  Last Completed Colonoscopy    This patient has no relevant Health Maintenance data.       Immunization History   Administered Date(s) Administered    COVID-19 (MODERNA) 1st,2nd,3rd Dose Monovalent 2021, 2021    COVID-19 (MODERNA) Monovalent Original Booster 2022     Last Completed Mammogram            Upcoming       MAMMOGRAM (Every 2 Years) Next due on 3/8/2026      2024  Mammo Screening Digital Tomosynthesis Bilateral With CAD                              FAMILY HISTORY:  Family History   Problem Relation Age of Onset    No Known Problems Father     Heart defect Mother     Prostate cancer Paternal Grandfather     Pancreatic cancer Paternal Grandfather     No Known Problems Paternal Grandmother     Hypertension Maternal Grandmother     Colon cancer Maternal Grandfather     Colon polyps Neg Hx     Esophageal cancer Neg Hx     Liver cancer Neg Hx     Stomach cancer Neg Hx     Rectal cancer Neg Hx     Liver disease Neg Hx     Melanoma Neg Hx     Uterine cancer Neg Hx     Ovarian cancer Neg Hx     Breast cancer Neg Hx      SOCIAL HISTORY:  Social History     Socioeconomic History    Marital status:    Tobacco Use    Smoking status: Former     Current packs/day: 0.00     Average packs/day: 0.5 packs/day for 5.0 years (2.5 ttl pk-yrs)     Types: Cigarettes     Start date:      Quit date:      Years since quittin.2    Smokeless tobacco: Never    Tobacco comments:     Don’t remember dates, quit year    Vaping Use    Vaping status: Never Used   Substance and Sexual Activity    Alcohol use: Yes     Alcohol/week: 1.0 standard drink of alcohol     Types: 1 Glasses of wine per week     Comment: once in a blue moon liquor    Drug use: No    Sexual activity: Yes     Partners: Male     Birth control/protection: Birth control pill       REVIEW OF SYSTEMS:  Review of Systems   Constitutional:  Negative for fatigue and fever.   HENT:  Negative for trouble swallowing.   "  Respiratory:  Negative for cough and shortness of breath.    Cardiovascular:  Negative for chest pain, palpitations and leg swelling.   Gastrointestinal:  Positive for abdominal pain, blood in stool and nausea. Negative for vomiting.   Genitourinary:  Negative for hematuria.   Musculoskeletal:  Negative for arthralgias and myalgias.   Skin:  Negative for rash and skin lesions.   Neurological:  Negative for dizziness, syncope, memory problem and confusion.   Psychiatric/Behavioral:  Negative for suicidal ideas and depressed mood. The patient is not nervous/anxious.        /84   Pulse 84   Temp 98.4 °F (36.9 °C)   Resp 16   Ht 167.6 cm (66\")   Wt 87.5 kg (193 lb)   SpO2 99%   BMI 31.15 kg/m²  Body surface area is 1.97 meters squared.    Pain Score    04/08/25 0850   PainSc: 0-No pain     Physical Exam  Constitutional:       Appearance: Normal appearance.   HENT:      Head: Normocephalic and atraumatic.   Cardiovascular:      Rate and Rhythm: Normal rate and regular rhythm.   Pulmonary:      Effort: Pulmonary effort is normal.      Breath sounds: Normal breath sounds.   Abdominal:      General: Bowel sounds are normal.      Palpations: Abdomen is soft.   Musculoskeletal:      Right lower leg: No edema.      Left lower leg: No edema.   Skin:     General: Skin is warm and dry.   Neurological:      Mental Status: She is alert and oriented to person, place, and time.   Psychiatric:         Attention and Perception: Attention normal.         Mood and Affect: Mood normal.         Judgment: Judgment normal.     I have reviewed the PHYSICAL EXAM and the accuracy of it. No changes since the information was documented.  Karena Tong, APRN 04/08/2025      Ratna Flor reports a pain score of 0.  Given her pain assessment as noted, treatment options were discussed and the following options were decided upon as a follow-up plan to address the patient's pain:  No intervention indicated .      ASSESSMENT / " PLAN    1. Iron deficiency    2. Elevated bilirubin      ASSESSMENT:      1.  Iron Deficiency   -Venofer infusion  05/06/22, Dec 2 and 5, 2022.    -Venofer infusions:  March 3, 10, 17 and 24, 2023   -Feraheme infusion September 1 and 8, 2023   -Labs 4/1/25:     -Hgb 13.0, Hct 37.2, Iron 158, Ferritin 174, Sat 43%, TIBC 364  -Pt states she is taking oral iron intermittently. Advised she could stop taking them.  -Stable for observation       2.    Elevated Bilirubin    -Labs sent to PCP deferring management      PLAN:  -Stable for observation   Labs only in 6 months.   RTC in 6 months 1 year  Patient will have preoffice labs for  CBC, CMP, Anemia Profile and Ferritin   Continue current medications, treatment plans and follow up with PCP and any other providers.  Care discussed with patient.  Understanding expressed.  Patient agreeable with plan.        Karena Tong, APRN  04/08/2025

## 2025-06-16 ENCOUNTER — OFFICE VISIT (OUTPATIENT)
Age: 40
End: 2025-06-16
Payer: COMMERCIAL

## 2025-06-16 VITALS
BODY MASS INDEX: 28.77 KG/M2 | SYSTOLIC BLOOD PRESSURE: 118 MMHG | HEIGHT: 66 IN | DIASTOLIC BLOOD PRESSURE: 80 MMHG | WEIGHT: 179 LBS

## 2025-06-16 DIAGNOSIS — Z30.41 ENCOUNTER FOR SURVEILLANCE OF CONTRACEPTIVE PILLS: Primary | ICD-10-CM

## 2025-06-16 PROCEDURE — 99213 OFFICE O/P EST LOW 20 MIN: CPT

## 2025-06-16 NOTE — PROGRESS NOTES
"Chief Complaint   Patient presents with    Hormones     Patient is here today for a GYN follow up to check up and discuss hormones. Patient does c/o three days of breakthrough bleeding. Patient denies any other problems or concerns.         History:  Ratna Flor is a 39 y.o. female who presents today for follow-up for evaluation of the above:  She presents to the office today for follow up on ocp and to discuss perimenopause.        ROS:  Review of Systems   Constitutional: Negative.    HENT: Negative.     Eyes: Negative.    Respiratory: Negative.     Cardiovascular: Negative.    Gastrointestinal: Negative.    Endocrine: Negative.    Genitourinary: Negative.    Musculoskeletal: Negative.    Skin: Negative.    Allergic/Immunologic: Negative.    Neurological: Negative.    Hematological: Negative.    Psychiatric/Behavioral: Negative.         Ms. Flor  reports that she quit smoking about 14 years ago. Her smoking use included cigarettes. She started smoking about 19 years ago. She has a 2.5 pack-year smoking history. She has never used smokeless tobacco. She reports current alcohol use of about 1.0 standard drink of alcohol per week. She reports that she does not use drugs.      Current Outpatient Medications:     fluticasone (FLONASE) 50 MCG/ACT nasal spray, 2 sprays into the nostril(s) as directed by provider Daily. 2 puffs each nostril, Disp: 16 g, Rfl: 0    Norethin-Eth Estrad-Fe Biphas (Lo Loestrin Fe) 1 MG-10 MCG / 10 MCG tablet, Take 1 tablet by mouth Daily for 90 days., Disp: 90 tablet, Rfl: 3      OBJECTIVE:  /80   Ht 167.6 cm (66\")   Wt 81.2 kg (179 lb)   LMP 06/14/2025 (Approximate)   BMI 28.89 kg/m²    Physical Exam  Vitals and nursing note reviewed.   Constitutional:       General: She is not in acute distress.     Appearance: Normal appearance. She is not ill-appearing or diaphoretic.   HENT:      Head: Normocephalic and atraumatic.   Pulmonary:      Effort: Pulmonary effort is normal. " No respiratory distress.   Musculoskeletal:         General: No deformity.      Cervical back: Normal range of motion.   Skin:     Coloration: Skin is not pale.   Neurological:      General: No focal deficit present.      Mental Status: She is alert.   Psychiatric:         Mood and Affect: Mood normal.         Behavior: Behavior normal.         Thought Content: Thought content normal.         Judgment: Judgment normal.       Assessment/Plan    Diagnoses and all orders for this visit:    1. Encounter for surveillance of contraceptive pills (Primary)  Comments:  The patient presents to the office today for follow up on ocp. She voices she has experienced recently 3 days of btb while taking ocp. She is going through a lot of stress and is questioning if she is perimenopausal. She denies vasomotor or presence of other symptoms. Discussed with her that likely she is not yet as she is experiencing regular cycles and absence of symptoms. She wishes to continue use of ocp. Refills at pharmacy.          An After Visit Summary was printed and given to the patient at discharge.  Return if symptoms worsen or fail to improve, for Next scheduled follow up. Sooner if problems arise.          Tanya Pemberton, MARIA INES

## (undated) DEVICE — THE CHANNEL CLEANING BRUSH IS A NYLON FLEXI BRUSH ATTACHED TO A FLEXIBLE PLASTIC SHEATH DESIGNED TO SAFELY REMOVE DEBRIS FROM FLEXIBLE ENDOSCOPES.

## (undated) DEVICE — FRCP BX RADJAW4 NDL 2.8 240 STD OG

## (undated) DEVICE — CAPS PILLCAM ENDO SB3EX

## (undated) DEVICE — SENSR O2 OXIMAX FNGR A/ 18IN NONSTR

## (undated) DEVICE — TBG SMPL FLTR LINE NASL 02/C02 A/ BX/100

## (undated) DEVICE — MASK,OXYGEN,MED CONC,ADLT,7' TUB, UC: Brand: PENDING

## (undated) DEVICE — Device: Brand: DEFENDO AIR/WATER/SUCTION AND BIOPSY VALVE

## (undated) DEVICE — ENDOGATOR AUXILIARY WATER JET CONNECTOR: Brand: ENDOGATOR

## (undated) DEVICE — CONMED SCOPE SAVER BITE BLOCK, 20X27 MM: Brand: SCOPE SAVER

## (undated) DEVICE — FORCEPS BX L240CM JAW DIA2.4MM ORNG L CAP W/ NDL DISP RAD

## (undated) DEVICE — YANKAUER,BULB TIP WITH VENT: Brand: ARGYLE

## (undated) DEVICE — ENDO KIT,LOURDES HOSPITAL: Brand: MEDLINE INDUSTRIES, INC.

## (undated) DEVICE — CUFF,BP,DISP,1 TUBE,ADULT,HP: Brand: MEDLINE